# Patient Record
Sex: FEMALE | Race: WHITE
[De-identification: names, ages, dates, MRNs, and addresses within clinical notes are randomized per-mention and may not be internally consistent; named-entity substitution may affect disease eponyms.]

---

## 2017-02-13 ENCOUNTER — HOSPITAL ENCOUNTER (OUTPATIENT)
Dept: HOSPITAL 47 - LABWHC1 | Age: 77
Discharge: HOME | End: 2017-02-13
Attending: OBSTETRICS & GYNECOLOGY
Payer: MEDICARE

## 2017-02-13 DIAGNOSIS — C56.2: Primary | ICD-10-CM

## 2017-02-13 PROCEDURE — 36415 COLL VENOUS BLD VENIPUNCTURE: CPT

## 2017-02-13 PROCEDURE — 86304 IMMUNOASSAY TUMOR CA 125: CPT

## 2017-02-21 ENCOUNTER — HOSPITAL ENCOUNTER (OUTPATIENT)
Dept: HOSPITAL 47 - RADCTMAIN | Age: 77
Discharge: HOME | End: 2017-02-21
Attending: OBSTETRICS & GYNECOLOGY
Payer: MEDICARE

## 2017-02-21 DIAGNOSIS — R59.0: ICD-10-CM

## 2017-02-21 DIAGNOSIS — K63.89: ICD-10-CM

## 2017-02-21 DIAGNOSIS — C56.1: Primary | ICD-10-CM

## 2017-02-21 DIAGNOSIS — K57.30: ICD-10-CM

## 2017-02-21 LAB
BUN SERPL-SCNC: 21 MG/DL (ref 7–17)
NON-AFRICAN AMERICAN GFR(MDRD): >60

## 2017-02-21 PROCEDURE — 82565 ASSAY OF CREATININE: CPT

## 2017-02-21 PROCEDURE — 71020: CPT

## 2017-02-21 PROCEDURE — 36415 COLL VENOUS BLD VENIPUNCTURE: CPT

## 2017-02-21 PROCEDURE — 74177 CT ABD & PELVIS W/CONTRAST: CPT

## 2017-02-21 PROCEDURE — 84520 ASSAY OF UREA NITROGEN: CPT

## 2017-02-21 NOTE — CT
EXAMINATION TYPE: CT abdomen pelvis w con

 

DATE OF EXAM: 2/21/2017 12:49 PM

 

COMPARISON: 10/24/2016

 

HISTORY: 76-year-old female has no complaints at time of service.  Follow up study for history of ova
ector CA.

 

TECHNIQUE: Contiguous axial scanning of the abdomen and pelvis following administration of 100 ml Omn
ipaque 300 IV contrast.  Delayed images through the kidneys and coronal/sagittal reconstructions perf
ormed.

 

CT DLP: 1008 mGycm

Automated exposure control for dose reduction was used.

 

 

FINDINGS: 

Heart is upper limits of normal in size without pericardial effusion. Strandy atelectasis and scarrin
g at the lung bases without pleural effusion.

 

Redemonstrated moderate to large hiatal hernia.

 

No focal liver lesion or biliary ductal dilatation. Portal venous system is patent.

 

Gallbladder, adrenal glands, kidneys, and spleen with a calcified granuloma appear within normal limi
ts. Focal calcification along the pancreatic body is unchanged. Otherwise, pancreas shows no gross ab
normality.

 

A 9 mm, mildly enlarged left paraaortic lymph node axial image 27 has increased in size

 

Scattered borderline and nonenlarged mesenteric lymph nodes show no significant change.

 

However, there is new mural based thickening along the right lateral aspect of the upper ascending co
nimco with adjacent omental soft tissue nodularity measuring up to 9 mm, refer to axial images 27 and 3
0.

 

Additional nodularity along the left paracolic gutter measuring 7 mm axial image 21 is new.

 

Redemonstrated ventral abdominal wall hernia involving a short segment Valerio hernia of the mid starr
sverse colon.

 

Bladder is urine distended. There is left hemicolonic diverticulosis, greatest in the sigmoid colon w
ithout acute diverticulitis. Subsequent frontal wall thickening of the distal sigmoid probably relate
s to nondistention. No abnormal fluid collection in the pelvis. Uterus and both ovaries are surgicall
y absent.

 

Bones: Osteitis pubis, degenerative changes at both SI joints, advanced degenerative changes in the m
id to lower lumbar spine with bilateral L5 pars defects and grade 1 L5-S1 anterolisthesis along with 
grade 2 anterolisthesis at L4-L5 secondary to hypertrophic facet arthropathy. No osseous destructive 
process seen.

 

 

 

IMPRESSION: 

 

1. NEW SOFT TISSUE THICKENING ALONG THE RIGHT LATERAL ASPECT OF THE UPPER ASCENDING COLON WITH ADJACE
NT OMENTAL NODULARITY. FINDINGS ARE SUSPICIOUS FOR EARLY DISEASE RECURRENCE.

2. THERE IS ALSO AN ENLARGING 9 MM LEFT PARA-AORTIC LYMPH NODE AND 7 MM SOFT TISSUE NODULARITY IN THE
 LEFT PARACOLIC GUTTER WHICH ARE ALSO SUSPICIOUS.

3. REDEMONSTRATED SHORT SEGMENT TRANSVERSE COLON VALERIO HERNIA.

4. LEFT HEMICOLONIC DIVERTICULOSIS, GREATEST IN THE SIGMOID COLON.

5. ADVANCED DEGENERATIVE CHANGES OF THE SPINE WITH A DEGENERATIVE GRADE 2 ANTEROLISTHESIS AT L4-L5 AN
D A GRADE 1 ANTEROLISTHESIS L5-S1 SECONDARY TO L5 PARS DEFECTS.

## 2017-02-21 NOTE — XR
EXAMINATION TYPE: XR chest 2V

 

DATE OF EXAM: 2/21/2017 11:53 AM

 

COMPARISON: Prior chest x-ray 24 October 2016

 

HISTORY: Ovarian carcinoma

 

TECHNIQUE:  Frontal and lateral views of the chest are obtained.

 

FINDINGS:  There is no focal air space opacity, pleural effusion, or pneumothorax seen.  The cardiac 
silhouette size is within normal limits. Stable spinal curvature. There is a hiatal hernia present. P
rominent lung volume may be indicative of COPD.   The osseous structures are intact, lower thoracic c
ompression fracture T10 again noted.

 

IMPRESSION:  No acute cardiopulmonary process.

## 2017-03-19 ENCOUNTER — HOSPITAL ENCOUNTER (INPATIENT)
Dept: HOSPITAL 47 - EC | Age: 77
LOS: 4 days | Discharge: ANOTHER HOSPITAL | DRG: 872 | End: 2017-03-23
Payer: MEDICARE

## 2017-03-19 VITALS — BODY MASS INDEX: 28 KG/M2

## 2017-03-19 DIAGNOSIS — K57.30: ICD-10-CM

## 2017-03-19 DIAGNOSIS — Z85.038: ICD-10-CM

## 2017-03-19 DIAGNOSIS — Z90.49: ICD-10-CM

## 2017-03-19 DIAGNOSIS — I50.9: ICD-10-CM

## 2017-03-19 DIAGNOSIS — Z86.19: ICD-10-CM

## 2017-03-19 DIAGNOSIS — Z87.11: ICD-10-CM

## 2017-03-19 DIAGNOSIS — Z82.49: ICD-10-CM

## 2017-03-19 DIAGNOSIS — M81.0: ICD-10-CM

## 2017-03-19 DIAGNOSIS — Z79.899: ICD-10-CM

## 2017-03-19 DIAGNOSIS — E86.0: ICD-10-CM

## 2017-03-19 DIAGNOSIS — N17.9: ICD-10-CM

## 2017-03-19 DIAGNOSIS — R18.8: ICD-10-CM

## 2017-03-19 DIAGNOSIS — A41.9: Primary | ICD-10-CM

## 2017-03-19 DIAGNOSIS — I11.0: ICD-10-CM

## 2017-03-19 DIAGNOSIS — Z85.43: ICD-10-CM

## 2017-03-19 DIAGNOSIS — N39.0: ICD-10-CM

## 2017-03-19 DIAGNOSIS — K21.9: ICD-10-CM

## 2017-03-19 DIAGNOSIS — E44.1: ICD-10-CM

## 2017-03-19 DIAGNOSIS — Z90.710: ICD-10-CM

## 2017-03-19 LAB
ALP SERPL-CCNC: 80 U/L (ref 38–126)
ALT SERPL-CCNC: 33 U/L (ref 9–52)
AMYLASE SERPL-CCNC: 39 U/L (ref 30–110)
ANION GAP SERPL CALC-SCNC: 20 MMOL/L
AST SERPL-CCNC: 18 U/L (ref 14–36)
BASOPHILS # BLD AUTO: 0 K/UL (ref 0–0.2)
BASOPHILS NFR BLD AUTO: 0 %
BILIRUB UR QL STRIP.AUTO: (no result)
BUN SERPL-SCNC: 35 MG/DL (ref 7–17)
CALCIUM SPEC-MCNC: 8.9 MG/DL (ref 8.4–10.2)
CH: 30.7
CHCM: 33.5
CHLORIDE SERPL-SCNC: 98 MMOL/L (ref 98–107)
CO2 SERPL-SCNC: 19 MMOL/L (ref 22–30)
EOSINOPHIL # BLD AUTO: 0 K/UL (ref 0–0.7)
EOSINOPHIL NFR BLD AUTO: 0 %
ERYTHROCYTE [DISTWIDTH] IN BLOOD BY AUTOMATED COUNT: 3.97 M/UL (ref 3.8–5.4)
ERYTHROCYTE [DISTWIDTH] IN BLOOD: 11.9 % (ref 11.5–15.5)
GLUCOSE SERPL-MCNC: 143 MG/DL (ref 74–99)
HCT VFR BLD AUTO: 36.4 % (ref 34–46)
HDW: 2.54
HGB BLD-MCNC: 12 GM/DL (ref 11.4–16)
INR PPP: 1.2 (ref ?–1.1)
KETONES UR QL STRIP.AUTO: (no result)
LUC NFR BLD AUTO: 1 %
LYMPHOCYTES # SPEC AUTO: 0.7 K/UL (ref 1–4.8)
LYMPHOCYTES NFR SPEC AUTO: 4 %
MCH RBC QN AUTO: 30.2 PG (ref 25–35)
MCHC RBC AUTO-ENTMCNC: 32.9 G/DL (ref 31–37)
MCV RBC AUTO: 91.8 FL (ref 80–100)
MONOCYTES # BLD AUTO: 1.8 K/UL (ref 0–1)
MONOCYTES NFR BLD AUTO: 9 %
NEUTROPHILS # BLD AUTO: 16.5 K/UL (ref 1.3–7.7)
NEUTROPHILS NFR BLD AUTO: 85 %
NON-AFRICAN AMERICAN GFR(MDRD): 31
PARTICLE COUNT: (no result)
PH UR: 5 [PH] (ref 5–8)
POTASSIUM SERPL-SCNC: 4.3 MMOL/L (ref 3.5–5.1)
PROT SERPL-MCNC: 5.9 G/DL (ref 6.3–8.2)
PROT UR QL: (no result)
PT BLD: 11.5 SEC (ref 9–12)
RBC UR QL: 10 /HPF (ref 0–5)
SODIUM SERPL-SCNC: 137 MMOL/L (ref 137–145)
SP GR UR: 1.02 (ref 1–1.03)
SQUAMOUS UR QL AUTO: 5 /HPF (ref 0–4)
UA BILLING (MACRO VS. MICRO): (no result)
UROBILINOGEN UR QL STRIP: 2 MG/DL (ref ?–2)
WBC # BLD AUTO: 0.27 10*3/UL
WBC # BLD AUTO: 19.4 K/UL (ref 3.8–10.6)
WBC #/AREA URNS HPF: >182 /HPF (ref 0–5)
WBC (PEROX): 19.74

## 2017-03-19 PROCEDURE — 93005 ELECTROCARDIOGRAM TRACING: CPT

## 2017-03-19 PROCEDURE — 87324 CLOSTRIDIUM AG IA: CPT

## 2017-03-19 PROCEDURE — 87086 URINE CULTURE/COLONY COUNT: CPT

## 2017-03-19 PROCEDURE — 81001 URINALYSIS AUTO W/SCOPE: CPT

## 2017-03-19 PROCEDURE — 96375 TX/PRO/DX INJ NEW DRUG ADDON: CPT

## 2017-03-19 PROCEDURE — 71010: CPT

## 2017-03-19 PROCEDURE — 82150 ASSAY OF AMYLASE: CPT

## 2017-03-19 PROCEDURE — 96365 THER/PROPH/DIAG IV INF INIT: CPT

## 2017-03-19 PROCEDURE — 85025 COMPLETE CBC W/AUTO DIFF WBC: CPT

## 2017-03-19 PROCEDURE — 85610 PROTHROMBIN TIME: CPT

## 2017-03-19 PROCEDURE — 87040 BLOOD CULTURE FOR BACTERIA: CPT

## 2017-03-19 PROCEDURE — 36415 COLL VENOUS BLD VENIPUNCTURE: CPT

## 2017-03-19 PROCEDURE — 83605 ASSAY OF LACTIC ACID: CPT

## 2017-03-19 PROCEDURE — 87046 STOOL CULTR AEROBIC BACT EA: CPT

## 2017-03-19 PROCEDURE — 74177 CT ABD & PELVIS W/CONTRAST: CPT

## 2017-03-19 PROCEDURE — 74020: CPT

## 2017-03-19 PROCEDURE — 96372 THER/PROPH/DIAG INJ SC/IM: CPT

## 2017-03-19 PROCEDURE — 96361 HYDRATE IV INFUSION ADD-ON: CPT

## 2017-03-19 PROCEDURE — 87045 FECES CULTURE AEROBIC BACT: CPT

## 2017-03-19 PROCEDURE — 99285 EMERGENCY DEPT VISIT HI MDM: CPT

## 2017-03-19 PROCEDURE — 80053 COMPREHEN METABOLIC PANEL: CPT

## 2017-03-19 PROCEDURE — 74176 CT ABD & PELVIS W/O CONTRAST: CPT

## 2017-03-19 PROCEDURE — 83690 ASSAY OF LIPASE: CPT

## 2017-03-19 RX ADMIN — ESCITALOPRAM OXALATE SCH MG: 10 TABLET, FILM COATED ORAL at 21:12

## 2017-03-19 RX ADMIN — CEFAZOLIN SCH: 330 INJECTION, POWDER, FOR SOLUTION INTRAMUSCULAR; INTRAVENOUS at 18:08

## 2017-03-19 RX ADMIN — PANTOPRAZOLE SODIUM SCH MG: 40 INJECTION, POWDER, FOR SOLUTION INTRAVENOUS at 21:16

## 2017-03-19 NOTE — ED
Abdominal Pain HPI





- General


Source: patient, RN notes reviewed


Mode of arrival: ambulatory


Limitations: no limitations





<Rocio Zamora - Last Filed: 17 15:30>





<Savage Dumont - Last Filed: 17 15:32>





- General


Chief Complaint: Abdominal Pain


Stated Complaint: NAUSEA AND VOMITING X 3 DAYS


Time Seen by Provider: 17 11:44





- History of Present Illness


Initial Comments: 





76-year-old female presents to the emergency department with chief complaint of 

epigastric abdominal pain.  Patient states that she has had this pain since 

about Friday.  Patient states that she has had nausea vomiting and diarrhea 

with this.  Patient states that she just has a little bit of epigastric 

discomfort.  Patient recently did have a bowel resection 2 weeks ago.  Patient 

states she hasn't really noticed a high fever but she has just felt achy and 

off.  Patient states she was concerned due to her continued symptoms that she 

thought that she should be evaluated. Patient denies any recent fever, chills, 

shortness of breath, chest pain, back pain,  numbness or tingling, dysuria or 

hematuria, constipation,  headaches or visual changes, or any other current 

symptoms. (Rocio Zamora)





- Related Data


 Home Medications











 Medication  Instructions  Recorded  Confirmed


 


Escitalopram [Lexapro] 10 mg PO HS 05/05/15 03/19/17


 


Cholecalciferol [Vitamin D3] 1,000 unit PO DAILY 16


 


ALPRAZolam [Xanax] 0.25 mg PO TID PRN 17


 


Enoxaparin [Lovenox] 40 mg SQ DAILY 17


 


HYDROcodone/APAP 5-325MG [Norco 1 tab PO Q4HR PRN 17





5-325]   


 


Metoprolol Succinate [Toprol XL] 25 mg PO DAILY 17


 


Multivits-Min/Iron/FA/Lutein 1 tab PO DAILY 17





[Centrum Silver Women Tablet]   











 Allergies











Allergy/AdvReac Type Severity Reaction Status Date / Time


 


No Known Allergies Allergy   Verified 17 12:24














Review of Systems


ROS Other: All systems not noted in ROS Statement are negative.





<Rocio Zamora - Last Filed: 17 15:30>


ROS Other: All systems not noted in ROS Statement are negative.





<Savage Dumont - Last Filed: 17 15:32>


ROS Statement: 


Those systems with pertinent positive or pertinent negative responses have been 

documented in the HPI.








Past Medical History


Past Medical History: Hypertension


Additional Past Medical History / Comment(s): Other HX:  gastric ulcer years ago

, osteoporosis, heart murmur, athritis bilateral hands  /     2015  [facility] 

CANCER WITH CHEMO AND SURGERY/shingles


History of Any Multi-Drug Resistant Organisms: None Reported


Past Surgical History: Hernia Repair, Hysterectomy


Additional Past Surgical History / Comment(s): Abdominal hernia repair 40 yrs 

ago.    HX HYSTERECTOMY AND OOPHERECTOMY


Past Anesthesia/Blood Transfusion Reactions: No Reported Reaction


Additional Past Anesthesia/Blood Transfusion Reaction / Comment(s): Pt has 

never recieved blood.


Past Psychological History: Anxiety


Additional Psychological History / Comment(s): Pt lives alone.  She has a very 

supportive family and good friends and neighbors.  She is normally active and 

still works at [employer].


Smoking Status: Never smoker


Past Alcohol Use History: None Reported


Past Drug Use History: None Reported





- Past Family History


  ** Father


Family Medical History: Cancer, Congestive Heart Failure (CHF)


Additional Family Medical History / Comment(s): Father had rheumatic fever as a 

child.  He  in his 60s.





  ** Mother


Family Medical History: Congestive Heart Failure (CHF)





<Rocio Zamora - Last Filed: 17 15:30>





General Exam


Limitations: no limitations





<Rocio Zamora - Last Filed: 17 15:30>


General appearance: alert, in no apparent distress


Head exam: Present: atraumatic, normocephalic, normal inspection


Eye exam: Present: normal appearance, PERRL, EOMI.  Absent: scleral icterus, 

conjunctival injection, periorbital swelling


ENT exam: Present: normal exam, mucous membranes moist


Neck exam: Present: normal inspection.  Absent: tenderness, meningismus, 

lymphadenopathy


Respiratory exam: Present: normal lung sounds bilaterally.  Absent: respiratory 

distress, wheezes, rales, rhonchi, stridor


Cardiovascular Exam: Present: regular rate, normal rhythm, normal heart sounds.

  Absent: systolic murmur, diastolic murmur, rubs, gallop, clicks


GI/Abdominal exam: Present: soft, normal bowel sounds.  Absent: distended, 

tenderness, guarding, rebound, rigid


Extremities exam: Present: normal inspection, full ROM, normal capillary 

refill.  Absent: tenderness, pedal edema, joint swelling, calf tenderness


Back exam: Present: normal inspection


Neurological exam: Present: alert, oriented X3, CN II-XII intact


Psychiatric exam: Present: normal affect, normal mood


Skin exam: Present: warm, dry, intact, normal color.  Absent: rash





<aSvage Dumont - Last Filed: 17 15:32>





- General Exam Comments


Initial Comments: 





General:  The patient is awake and alert, in no distress, and does not appear 

acutely ill. 


Eye:  Pupils are equal, round and reactive to light, extra-ocular movements are 

intact; there is normal conjunctiva bilaterally.  No signs of icterus.  


Ears, nose, mouth and throat:  There are moist mucous membranes.


Neck:  The neck is supple, there is no tenderness.


Cardiovascular:  There is a regular rate and rhythm. No murmur, rub or gallop 

is appreciated.


Respiratory:  Lungs are clear to auscultation, respirations are non-labored, 

breath sounds are equal.  No wheezes, stridor, rales, or rhonchi.


Gastrointestinal: Healing surgical incision with staples still intact, Soft, non

-distended, non-tender abdomen without masses or organomegaly noted. There is 

no rebound or guarding present.  No CVA tenderness. Bowel sounds are 

unremarkable.


Back:  There is no tenderness to palpation in the midline. There is no obvious 

deformity. No rashes noted. 


Musculoskeletal:  Normal ROM, no tenderness, There is no pedal edema. There is 

no calf tenderness or swelling. Sensation intact. Pulses equal bilaterally 2+.  


Neurological:  CN II-XII intact, There are no obvious motor or sensory 

deficits. Coordination appears grossly intact. Speech is normal.


Skin:  Skin is warm and dry and no rashes or lesions are noted. 


Psychiatric:  Cooperative, appropriate mood & affect, normal judgment.  


 (Rocio Zamora)





Incision is clean dry intact (Svaage Dumont)





Course





<Rocio Zamora - Last Filed: 17 15:30>





<Savage Dumont - Last Filed: 17 15:32>





 Vital Signs











  17





  11:38 14:12


 


Temperature 99.3 F 99.3 F


 


Pulse Rate 120 H 95


 


Respiratory 20 18





Rate  


 


Blood Pressure 122/65 118/56


 


O2 Sat by Pulse 97 99





Oximetry  














- Reevaluation(s)


Reevaluation #1: 





17 15:31


Patient's symptoms controlled with antiemetics IV hydration (Savage Dumont)





Medical Decision Making





- Lab Data


Result diagrams: 


 17 11:59





 17 11:59





- Radiology Data


Radiology results: report reviewed, image reviewed





<Rocio Zamora - Last Filed: 17 15:30>





- Lab Data


Result diagrams: 


 17 11:59





 17 11:59





- Radiology Data


Radiology results: report reviewed (CT pelvis negative for. negative for postOp 

complication)





<Savage Dumont - Last Filed: 17 15:32>





- Medical Decision Making





76-year-old female presents for nausea vomiting and diarrhea.  This time patient

's lab work is reviewed that show an elevated with blood cell count with what 

appears to be a positive urine for UTI.  We will start Rocephin for the 

patient.  Patient also is found to have some inflammation around her surgical 

sutures external exam does appear to be normal.  This time most likely normal 

healing.  This time we will admit the patient for dehydration, UTI, nausea 

vomiting.  Patient will go to on call doctor tomorrow.  Discussed with family 

in the end agreement with the plan.  Patient's oncologist Dr. SAXENA will also be 

consulted. (Rocio Zamora)





36 female the ER with nausea vomiting mild without any cramping, positive for 

urinary tract infection with diarrhea, though she was IV antibiotics and 

rehydration control vomiting.  No postop palpitation at this time is noted (

Savage Dumont)





- Lab Data





 Lab Results











  17 Range/Units





  11:59 11:59 11:59 


 


WBC   19.4 H   (3.8-10.6)  k/uL


 


RBC   3.97   (3.80-5.40)  m/uL


 


Hgb   12.0   (11.4-16.0)  gm/dL


 


Hct   36.4   (34.0-46.0)  %


 


MCV   91.8   (80.0-100.0)  fL


 


MCH   30.2   (25.0-35.0)  pg


 


MCHC   32.9   (31.0-37.0)  g/dL


 


RDW   11.9   (11.5-15.5)  %


 


Plt Count   394   (150-450)  k/uL


 


Neutrophils %   85   %


 


Lymphocytes %   4   %


 


Monocytes %   9   %


 


Eosinophils %   0   %


 


Basophils %   0   %


 


Neutrophils #   16.5 H   (1.3-7.7)  k/uL


 


Lymphocytes #   0.7 L   (1.0-4.8)  k/uL


 


Monocytes #   1.8 H   (0-1.0)  k/uL


 


Eosinophils #   0.0   (0-0.7)  k/uL


 


Basophils #   0.0   (0-0.2)  k/uL


 


Sodium  137    (137-145)  mmol/L


 


Potassium  4.3    (3.5-5.1)  mmol/L


 


Chloride  98    ()  mmol/L


 


Carbon Dioxide  19 L    (22-30)  mmol/L


 


Anion Gap  20    mmol/L


 


BUN  35 H    (7-17)  mg/dL


 


Creatinine  1.62 H    (0.52-1.04)  mg/dL


 


Est GFR (MDRD) Af Amer  38    (>60 ml/min/1.73 sqM)  


 


Est GFR (MDRD) Non-Af  31    (>60 ml/min/1.73 sqM)  


 


Glucose  143 H    (74-99)  mg/dL


 


Plasma Lactic Acid Juan Carlos    1.6  (0.7-2.0)  mmol/L


 


Calcium  8.9    (8.4-10.2)  mg/dL


 


Total Bilirubin  1.0    (0.2-1.3)  mg/dL


 


AST  18    (14-36)  U/L


 


ALT  33    (9-52)  U/L


 


Alkaline Phosphatase  80    ()  U/L


 


Total Protein  5.9 L    (6.3-8.2)  g/dL


 


Albumin  3.1 L    (3.5-5.0)  g/dL


 


Amylase  39    ()  U/L


 


Lipase  55    ()  U/L


 


Urine Color     


 


Urine Appearance     (Clear)  


 


Urine pH     (5.0-8.0)  


 


Ur Specific Gravity     (1.001-1.035)  


 


Urine Protein     (Negative)  


 


Urine Glucose (UA)     (Negative)  


 


Urine Ketones     (Negative)  


 


Urine Blood     (Negative)  


 


Urine Nitrite     (Negative)  


 


Urine Bilirubin     (Negative)  


 


Urine Urobilinogen     (<2.0)  mg/dL


 


Ur Leukocyte Esterase     (Negative)  


 


Urine RBC     (0-5)  /hpf


 


Urine WBC     (0-5)  /hpf


 


Urine WBC Clumps     (None)  /hpf


 


Ur Squamous Epith Cells     (0-4)  /hpf


 


Amorphous Sediment     (None)  /hpf


 


Urine Bacteria     (None)  /hpf


 


Hyaline Casts     (0-2)  /lpf


 


Urine Mucus     (None)  /hpf














  17 Range/Units





  14:52 


 


WBC   (3.8-10.6)  k/uL


 


RBC   (3.80-5.40)  m/uL


 


Hgb   (11.4-16.0)  gm/dL


 


Hct   (34.0-46.0)  %


 


MCV   (80.0-100.0)  fL


 


MCH   (25.0-35.0)  pg


 


MCHC   (31.0-37.0)  g/dL


 


RDW   (11.5-15.5)  %


 


Plt Count   (150-450)  k/uL


 


Neutrophils %   %


 


Lymphocytes %   %


 


Monocytes %   %


 


Eosinophils %   %


 


Basophils %   %


 


Neutrophils #   (1.3-7.7)  k/uL


 


Lymphocytes #   (1.0-4.8)  k/uL


 


Monocytes #   (0-1.0)  k/uL


 


Eosinophils #   (0-0.7)  k/uL


 


Basophils #   (0-0.2)  k/uL


 


Sodium   (137-145)  mmol/L


 


Potassium   (3.5-5.1)  mmol/L


 


Chloride   ()  mmol/L


 


Carbon Dioxide   (22-30)  mmol/L


 


Anion Gap   mmol/L


 


BUN   (7-17)  mg/dL


 


Creatinine   (0.52-1.04)  mg/dL


 


Est GFR (MDRD) Af Amer   (>60 ml/min/1.73 sqM)  


 


Est GFR (MDRD) Non-Af   (>60 ml/min/1.73 sqM)  


 


Glucose   (74-99)  mg/dL


 


Plasma Lactic Acid Juan Carlos   (0.7-2.0)  mmol/L


 


Calcium   (8.4-10.2)  mg/dL


 


Total Bilirubin   (0.2-1.3)  mg/dL


 


AST   (14-36)  U/L


 


ALT   (9-52)  U/L


 


Alkaline Phosphatase   ()  U/L


 


Total Protein   (6.3-8.2)  g/dL


 


Albumin   (3.5-5.0)  g/dL


 


Amylase   ()  U/L


 


Lipase   ()  U/L


 


Urine Color  Yellow  


 


Urine Appearance  Cloudy H  (Clear)  


 


Urine pH  5.0  (5.0-8.0)  


 


Ur Specific Gravity  1.018  (1.001-1.035)  


 


Urine Protein  1+ H  (Negative)  


 


Urine Glucose (UA)  Trace H  (Negative)  


 


Urine Ketones  1+ H  (Negative)  


 


Urine Blood  Small H  (Negative)  


 


Urine Nitrite  Negative  (Negative)  


 


Urine Bilirubin  1+ H  (Negative)  


 


Urine Urobilinogen  2.0  (<2.0)  mg/dL


 


Ur Leukocyte Esterase  Moderate H  (Negative)  


 


Urine RBC  10 H  (0-5)  /hpf


 


Urine WBC  >182 H  (0-5)  /hpf


 


Urine WBC Clumps  Moderate H  (None)  /hpf


 


Ur Squamous Epith Cells  5 H  (0-4)  /hpf


 


Amorphous Sediment  Occasional H  (None)  /hpf


 


Urine Bacteria  Occasional H  (None)  /hpf


 


Hyaline Casts  63 H  (0-2)  /lpf


 


Urine Mucus  Rare H  (None)  /hpf














Disposition


Time of Disposition: 15:30


Decision Date: 17


Decision Time: 15:30





<Rocio Zamora - Last Filed: 17 15:30>





<Savage Dumont - Last Filed: 17 15:32>


Clinical Impression: 


 Nausea & vomiting, UTI (urinary tract infection), Ovarian cancer, Abdominal 

pain, Ascites





Disposition: ADMITTED AS IP TO THIS [facility]


Condition: Stable


Referrals: 


PINA Lay III, MD [Primary Care Provider] - 1-2 days

## 2017-03-19 NOTE — P.GSCN
History of Present Illness


Consult date: 17


Reason for Consult: 





Abdominal pain, distention and vomiting


History of present illness: 





The patient is a pleasant 76-year-old female who began having abdominal 

complaints Thursday night.  She had epigastric discomfort and nausea. she began 

having some intermittent vomiting Friday then Saturday.  It got worse today so 

she came into the emergency department.  She admits to some coffee ground 

emesis.  She's had some bowel movements without dark tarry stools or obvious 

blood.  She's about 2 weeks post colon resection for colon cancer.  She has a 

history of a ELVIA/BSO one year ago for ovarian cancer.  She believes there was 

no sign of ovarian cancer at her recent operation.  No fevers or chills.  No 

one else is been ill.  She has been taking Norco for pain along with ibuprofen 

she has a history of ulcer disease in the past.  She's been taking either 

subcutaneous heparin or Lovenox at home for DVT prophylaxis.





Review of Systems


All systems: negative





Past Medical History


Past Medical History: Cancer, Heart Failure, GERD/Reflux, Hypertension


Additional Past Medical History / Comment(s): Other HX:  gastric ulcer years ago

, osteoporosis, heart murmur, athritis bilateral hands  /     2015  [facility] 

CANCER WITH CHEMO AND ELVIA/BSO/shingles


History of Any Multi-Drug Resistant Organisms: None Reported


Past Surgical History: Hernia Repair, Hysterectomy


Additional Past Surgical History / Comment(s): Abdominal hernia repair 40 yrs 

ago.    ELVIA/BSO, colon resection 2 weeks ago


Past Anesthesia/Blood Transfusion Reactions: No Reported Reaction


Additional Past Anesthesia/Blood Transfusion Reaction / Comm: Pt has never 

recieved blood.


Past Psychological History: Anxiety


Additional Psychological History / Comment(s): Pt lives alone.  She has a very 

supportive family and good friends and neighbors.  She is normally active and 

still works at [employer].


Smoking Status: Never smoker


Past Alcohol Use History: None Reported


Past Drug Use History: None Reported





- Past Family History


  ** Father


Family Medical History: Cancer, Congestive Heart Failure (CHF)


Additional Family Medical History / Comment(s): Father had rheumatic fever as a 

child.  He  in his 60s.





  ** Mother


Family Medical History: Congestive Heart Failure (CHF)





Medications and Allergies


 Home Medications











 Medication  Instructions  Recorded  Confirmed  Type


 


Escitalopram [Lexapro] 10 mg PO HS 05/05/15 03/19/17 History


 


Cholecalciferol [Vitamin D3] 1,000 unit PO DAILY 16 History


 


ALPRAZolam [Xanax] 0.25 mg PO TID PRN 17 History


 


Enoxaparin [Lovenox] 40 mg SQ DAILY 17 History


 


HYDROcodone/APAP 5-325MG [Norco 1 tab PO Q4HR PRN 17 History





5-325]    


 


Metoprolol Succinate [Toprol XL] 25 mg PO DAILY 17 History


 


Multivits-Min/Iron/FA/Lutein 1 tab PO DAILY 17 History





[Centrum Silver Women Tablet]    











 Allergies











Allergy/AdvReac Type Severity Reaction Status Date / Time


 


No Known Allergies Allergy   Verified 17 12:24














Surgical - Exam


Osteopathic Statement: *.  No significant issues noted on an osteopathic 

structural exam other than those noted in the History and Physical/Consult.


 Vital Signs











Temp Pulse Resp BP Pulse Ox


 


 99.3 F   120 H  20   122/65   97 


 


 17 11:38  17 11:38  17 11:38  17 11:38  17 11:38














- General


well developed, well nourished, no distress





- Eyes


normal ocular movement





- Neck


trachea midline, no lymphadectomy





- Respiratory


normal respiratory effort, clear to auscultation





- Cardiovascular


Rhythm: regular


Abnormal Heart Sounds: systolic murmur





- Abdomen


Abdomen: soft, tender (In the epigastrium), bowel sounds, surgical scars (Long 

midline scar with staples which are intact.  No evidence of cellulitis or seroma

)





- Psychiatric


oriented to time, oriented to person, oriented to place, speech is normal, 

memory intact





Results





- Labs





 17 11:59





 17 11:59





- Imaging


CT scan - abdomen: report reviewed, image reviewed (Likely normal postsurgical 

changes)





Assessment and Plan


(1) History of peptic ulcer disease


Status: Acute   





(2) Coffee ground emesis


Status: Acute   





(3) Abdominal pain


Status: Acute   





(4) Nausea & vomiting


Status: Acute   





(5) UTI (urinary tract infection)


Status: Acute   


Plan: 





I agree with medical management of hydration, antibiotics, antibiotics, bowel 

rest.  Ulcer prophylaxis.  Will add Carafate.  Possible EGD tomorrow depending 

on patient's condition.  Further recommendations to follow.

## 2017-03-19 NOTE — CT
EXAMINATION TYPE: CT abdomen pelvis wo con

 

DATE OF EXAM: 3/19/2017 1:45 PM

 

HISTORY: Recent bowel resection due to colon CA. Vomiting and nausea x3 days.

 

CT DLP: 303.6 mGycm.  Automated Exposure Control for Dose Reduction was Utilized.

 

TECHNIQUE:  CT scan of the abdomen and pelvis is performed without oral or IV contrast.

 

COMPARISON: CT abdomen pelvis February 21, 2017

 

FINDINGS:  Within the limitations of a non-contrast study, the following observations are made.

 

LUNG BASES: There are tiny bilateral pleural effusions now present.

 

LIVER/GB: Liver is somewhat small in size and current study with new surrounding perihepatic ascites.
 Gallbladder has distended margins without CT dense intraluminal gallstones or suspicious wall thicke
troy on current study.

 

PANCREAS: No significant abnormality is seen.

 

SPLEEN: Occasional calcifications scattered throughout the spleen are present seen.

 

ADRENALS: No significant abnormality is seen.

 

KIDNEYS: No significant abnormality is seen.

 

BOWEL: There is partial visualization of moderate to large size hiatal hernia redemonstrated. Evaluat
ion bowel is suboptimal due to lack of enteric contrast. There is no suspicious small or large bowel 
dilatation seen. Diffuse colonic diverticulosis is present. Surgical sutures from right-sided hemicol
ectomy are noted. There is mild to moderate wall thickening of the remnant transverse and proximal le
ft colon.

 

GENITAL ORGANS: Uterus is surgically absent.

 

LYMPH NODES: No greater than 1cm abdominal or pelvic lymph nodes are appreciated. There are scattered
 prominent but subcentimeter lymph nodes throughout the mesentery.

 

OSSEOUS STRUCTURES: There is multilevel moderate to advanced disc space narrowing with spurring and v
acuum disc phenomenon. There is grade 1 anterolisthesis of L4 on L5. Sclerosis at pubic symphysis is 
redemonstrated.

 

OTHER: There is new small to moderate amount of abdominal and pelvic ascites which does not completel
y layer dependently making slightly more suspicious.

 

There is interval surgery of eventrated hemidiaphragm with overlying vertical skin staples present. V
ascular calculation of aorta and branch vessels is present.

 

IMPRESSION:

1. New small to moderate amount of nonsimple abdominal and pelvic ascites. Liver is somewhat small in
 size and lobulated in contour, clinical correlation for cirrhosis advised.

2. No bowel obstruction. Abnormal wall thickening of proximal colon near sutures could reflect inflam
matory or infectious process. Neoplasm is in differential but felt less likely.

## 2017-03-19 NOTE — XR
EXAMINATION TYPE: XR chest 1V portable

 

DATE OF EXAM: 3/19/2017 6:47 PM

 

CLINICAL HISTORY: CHF per order. Vomiting and nausea for 3 days. History of ovarian cancer.

 

TECHNIQUE: Single AP portable frontal upright view of the chest is obtained.

 

COMPARISON: Chest x-ray February 21, 2017. CT cap January 4, 2016.

 

FINDINGS: Diminished inspiration is seen on current study There is no focal air space opacity, pleura
l effusion, or pneumothorax seen.  The cardiac silhouette size is enlarged with ectatic ascending tho
racic aorta redemonstrated.   The osseous structures are demineralized.

 

IMPRESSION: Cardiomegaly without acute pulmonary process.

## 2017-03-20 LAB
ALP SERPL-CCNC: 53 U/L (ref 38–126)
ALT SERPL-CCNC: 28 U/L (ref 9–52)
ANION GAP SERPL CALC-SCNC: 10 MMOL/L
AST SERPL-CCNC: 21 U/L (ref 14–36)
BASOPHILS # BLD AUTO: 0 K/UL (ref 0–0.2)
BASOPHILS NFR BLD AUTO: 0 %
BUN SERPL-SCNC: 27 MG/DL (ref 7–17)
CALCIUM SPEC-MCNC: 7.8 MG/DL (ref 8.4–10.2)
CH: 30
CHCM: 31.2
CHLORIDE SERPL-SCNC: 107 MMOL/L (ref 98–107)
CO2 SERPL-SCNC: 21 MMOL/L (ref 22–30)
EOSINOPHIL # BLD AUTO: 0 K/UL (ref 0–0.7)
EOSINOPHIL NFR BLD AUTO: 0 %
ERYTHROCYTE [DISTWIDTH] IN BLOOD BY AUTOMATED COUNT: 3.18 M/UL (ref 3.8–5.4)
ERYTHROCYTE [DISTWIDTH] IN BLOOD: 11.9 % (ref 11.5–15.5)
GLUCOSE SERPL-MCNC: 86 MG/DL (ref 74–99)
HCT VFR BLD AUTO: 30.6 % (ref 34–46)
HDW: 2.37
HGB BLD-MCNC: 10 GM/DL (ref 11.4–16)
LUC NFR BLD AUTO: 2 %
LYMPHOCYTES # SPEC AUTO: 1 K/UL (ref 1–4.8)
LYMPHOCYTES NFR SPEC AUTO: 8 %
MCH RBC QN AUTO: 31.4 PG (ref 25–35)
MCHC RBC AUTO-ENTMCNC: 32.6 G/DL (ref 31–37)
MCV RBC AUTO: 96.3 FL (ref 80–100)
MONOCYTES # BLD AUTO: 0.9 K/UL (ref 0–1)
MONOCYTES NFR BLD AUTO: 8 %
NEUTROPHILS # BLD AUTO: 9.7 K/UL (ref 1.3–7.7)
NEUTROPHILS NFR BLD AUTO: 82 %
NON-AFRICAN AMERICAN GFR(MDRD): >60
POTASSIUM SERPL-SCNC: 4.4 MMOL/L (ref 3.5–5.1)
PROT SERPL-MCNC: 4.8 G/DL (ref 6.3–8.2)
SODIUM SERPL-SCNC: 138 MMOL/L (ref 137–145)
WBC # BLD AUTO: 0.26 10*3/UL
WBC # BLD AUTO: 11.9 K/UL (ref 3.8–10.6)
WBC (PEROX): 12.46

## 2017-03-20 RX ADMIN — DEXTROSE MONOHYDRATE SCH MLS/HR: 5 INJECTION, SOLUTION INTRAVENOUS at 16:50

## 2017-03-20 RX ADMIN — Medication SCH UNIT: at 08:00

## 2017-03-20 RX ADMIN — ACETAMINOPHEN PRN MG: 325 TABLET, FILM COATED ORAL at 21:21

## 2017-03-20 RX ADMIN — METOPROLOL SUCCINATE SCH MG: 25 TABLET, EXTENDED RELEASE ORAL at 08:00

## 2017-03-20 RX ADMIN — CEFAZOLIN SCH MLS/HR: 330 INJECTION, POWDER, FOR SOLUTION INTRAMUSCULAR; INTRAVENOUS at 13:58

## 2017-03-20 RX ADMIN — THERA TABS SCH EACH: TAB at 13:56

## 2017-03-20 RX ADMIN — HYDROMORPHONE HYDROCHLORIDE PRN MG: 1 INJECTION, SOLUTION INTRAMUSCULAR; INTRAVENOUS; SUBCUTANEOUS at 03:30

## 2017-03-20 RX ADMIN — ESCITALOPRAM OXALATE SCH MG: 10 TABLET, FILM COATED ORAL at 20:04

## 2017-03-20 RX ADMIN — DEXTROSE MONOHYDRATE SCH MLS/HR: 5 INJECTION, SOLUTION INTRAVENOUS at 08:05

## 2017-03-20 RX ADMIN — ACETAMINOPHEN PRN MG: 325 TABLET, FILM COATED ORAL at 08:01

## 2017-03-20 RX ADMIN — ENOXAPARIN SODIUM SCH MG: 40 INJECTION SUBCUTANEOUS at 08:00

## 2017-03-20 RX ADMIN — HYDROMORPHONE HYDROCHLORIDE PRN MG: 1 INJECTION, SOLUTION INTRAMUSCULAR; INTRAVENOUS; SUBCUTANEOUS at 19:57

## 2017-03-20 RX ADMIN — ACETAMINOPHEN PRN MG: 325 TABLET, FILM COATED ORAL at 03:14

## 2017-03-20 RX ADMIN — PANTOPRAZOLE SODIUM SCH MG: 40 INJECTION, POWDER, FOR SOLUTION INTRAVENOUS at 08:00

## 2017-03-20 RX ADMIN — HYDROMORPHONE HYDROCHLORIDE PRN MG: 1 INJECTION, SOLUTION INTRAMUSCULAR; INTRAVENOUS; SUBCUTANEOUS at 13:55

## 2017-03-20 RX ADMIN — PANTOPRAZOLE SODIUM SCH MG: 40 INJECTION, POWDER, FOR SOLUTION INTRAVENOUS at 20:04

## 2017-03-20 NOTE — P.CONS
History of Present Illness





- Reason for Consult


Consult date: 17


ovarian cancer


Requesting physician: Rocio Zamora





- Chief Complaint


vomiting, progressive 





- History of Present Illness





Ms. Avelar is a pleasant  female initially seen in consult at [facility] 5/

8/15, admitted with a 2-3 day history of abdominal discomfort, generalized, 

associate with bloating, emesis x 1, severe heartburn x 1 week and decreased 

appetite and slight weight loss over the prior month. A CT AP without contrast-

due to renal failure-revealed ascites. She had a paracentesis, 4.7 L fluid 

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 

primary peritoneal origin per IHC. TVUS showed an endocervical mass like area, 

1.2 cm, though that is not definite as the origin, Ca 125 was 2760.  Staging 

PET scan on 5/15/15 showed no liver involvement or disease outside the abd/

pelvis.  She was referred to Dr. Eden, initial treatment with Carbo and dose 

dense Taxol recommended, she had 3 cycles, and then proceeded to surgery on 9/21

/15, and resumed chemo on 10/24/15, for a total of 6 cycles. CT after cycle 6 

showed no evidence of recurrence and pt was placed on surveillance.  Pt follows 

closely with Dr. Eden and Dr. Vale.  F/U CT 10/16 did not reveal any definite 

evidence of disease recurrence,  level did show an elevation after 

completion of chemo.  Pt had surgery with Dr. Eden two weeks ago, no note has 

been received by Dr. Vale pertaining to the same.  Pt states malignancy and 

there are plans for chemo monthly-suspect doxil therapy.  Pt states that Friday 

she stated having nausea and vomiting,  some diarrhea as well, this has been 

progressive and she has not been able to maintain oral intake.  She denies 

fevers, her incision is well healed, no gross bloating or abd pain, no leg 

swelling, she has been belching, denies passing much flatus, unsure of last BM, 

she thinks it was small.   





Review of Systems


All systems: negative


Constitutional: Reports as per HPI





Past Medical History


Past Medical History: Cancer, Heart Failure, GERD/Reflux, Hypertension


Additional Past Medical History / Comment(s): Other HX:  gastric ulcer years ago

, osteoporosis, heart murmur, athritis bilateral hands  /       OVARIAN 

CANCER WITH CHEMO AND ELVIA/BSO/shingles


History of Any Multi-Drug Resistant Organisms: None Reported


Past Surgical History: Hernia Repair, Hysterectomy


Additional Past Surgical History / Comment(s): Abdominal hernia repair 40 yrs 

ago.    ELVIA/BSO, colon resection 2 weeks ago


Past Anesthesia/Blood Transfusion Reactions: No Reported Reaction


Additional Past Anesthesia/Blood Transfusion Reaction / Comm: Pt has never 

recieved blood.


Past Psychological History: Anxiety


Additional Psychological History / Comment(s): Pt lives alone.  She has a very 

supportive family and good friends and neighbors.  She is normally active and 

still works at [employer].


Smoking Status: Never smoker


Past Alcohol Use History: None Reported


Past Drug Use History: None Reported





- Past Family History


  ** Father


Family Medical History: Cancer, Congestive Heart Failure (CHF)


Additional Family Medical History / Comment(s): Father had rheumatic fever as a 

child.  He  in his 60s.





  ** Mother


Family Medical History: Congestive Heart Failure (CHF)





Medications and Allergies


 Home Medications











 Medication  Instructions  Recorded  Confirmed  Type


 


Escitalopram [Lexapro] 10 mg PO HS 05/05/15 03/19/17 History


 


Cholecalciferol [Vitamin D3] 1,000 unit PO DAILY 16 History


 


ALPRAZolam [Xanax] 0.25 mg PO TID PRN 17 History


 


Enoxaparin [Lovenox] 40 mg SQ DAILY 17 History


 


HYDROcodone/APAP 5-325MG [Norco 1 tab PO Q4HR PRN 17 History





5-325]    


 


Metoprolol Succinate [Toprol XL] 25 mg PO DAILY 17 History


 


Multivits-Min/Iron/FA/Lutein 1 tab PO DAILY 17 History





[Centrum Silver Women Tablet]    











 Allergies











Allergy/AdvReac Type Severity Reaction Status Date / Time


 


No Known Allergies Allergy   Verified 17 12:24














Physical Exam


Vitals: 


 Vital Signs











  Temp Pulse Resp BP Pulse Ox


 


 17 15:00  98.9 F  82  18  107/55  93 L


 


 17 07:00  98.0 F  81  18  124/62  92 L


 


 17 00:00   96  18  


 


 17 22:34  97.8 F  96  18  116/60  95








 Intake and Output











 17





 06:59 14:59 22:59


 


Intake Total 240 450 


 


Balance 240 450 


 


Intake:   


 


  IV  400 


 


    Sodium Chloride 0.9% 1,  400 





    000 ml @ 50 mls/hr IV .   





    Q20H ANUPAM Rx#:[id number]   


 


  Intake, IV Titration  50 





  Amount   


 


    Piperacillin-Tazobactam 3  50 





    .375 gm In Dextrose/Water   





    1 50ml.bag @ 12.5 mls/hr   





    IVPB Q8HR ANUPAM Rx#:   





    [id number]   


 


  Oral 240  


 


Other:   


 


  Voiding Method Toilet Toilet Toilet


 


  # Voids 2  


 


  Weight 63.049 kg  














- Constitutional


General appearance: average body habitus, cooperative, no acute distress





- EENT


Eyes: anicteric sclerae, PERRLA, normal appearance


ENT: normal oropharynx





- Neck


Neck: no lymphadenopathy





- Respiratory


Respiratory: bilateral: CTA





- Cardiovascular


Heart sounds: normal: S1, S2


  ** leg


Peripheral Edema: bilateral: None





- Gastrointestinal


General gastrointestinal: no absent bowel sounds, no decreased bowel sounds, no 

distended, no hepatomegaly, no hyperactive bowel sounds, normal bowel sounds, 

no organomegaly, no rigid, no scaphoid, soft, no splenomegaly, no tenderness, 

no umbilical hernia, no ventral hernia





- Integumentary


Integumentary: normal





- Neurologic


Neurologic: CNII-XII intact





- Musculoskeletal


Musculoskeletal: strength equal bilaterally





- Psychiatric


Psychiatric: A&O x's 3, appropriate affect, intact judgment & insight





Results


CBC & Chem 7: 


 17 07:38





 17 07:38


Labs: 


 Abnormal Lab Results - Last 24 Hours (Table)











  17 Range/Units





  07:38 07:38 


 


WBC  11.9 H   (3.8-10.6)  k/uL


 


RBC  3.18 L   (3.80-5.40)  m/uL


 


Hgb  10.0 L D   (11.4-16.0)  gm/dL


 


Hct  30.6 L   (34.0-46.0)  %


 


Neutrophils #  9.7 H   (1.3-7.7)  k/uL


 


Carbon Dioxide   21 L  (22-30)  mmol/L


 


BUN   27 H  (7-17)  mg/dL


 


Calcium   7.8 L  (8.4-10.2)  mg/dL


 


Total Protein   4.8 L  (6.3-8.2)  g/dL


 


Albumin   2.3 L  (3.5-5.0)  g/dL











CT scan - abdomen: report reviewed


CT scan - pelvis: report reviewed





Assessment and Plan


(1) Ovarian cancer


Narrative/Plan: 


From pt description it does sound as though she has malignancy, wether this is 

recurrent or a new primary is not known, Dr. Vale will look for reports from Dr. Eden.  Pt typically receives her chemo locally so we will be updated in the 

plan of care.  Treatment cannot be started for about 4-6 weeks post op to allow 

time for healing.  We will f/u with pt in a few weeks in the outpatient 

setting.  


Status: Acute   


Plan: 





Suspect possible partial ileus post op, UA suspicious.  Pt being followed by IM 

and Surgery

## 2017-03-20 NOTE — HP
DATE OF ADMISSION:  03/19/2017



CHIEF COMPLAINT:  Abdominal pain, nausea, vomiting, and diarrhea, 

shaking, chills.  



HISTORY OF PRESENT ILLNESS: This is 76 -year-old woman with past 

medical history of adenocarcinoma of the ovaries, history of gastric 

ulcer, history of osteoporosis, history of chemotherapy being followed 

by Dr. Lay in the outpatient setting, also seeing Dr. Vale and Dr. Eden from the [facility].  The patient underwent second surgery 

but 2 weeks ago where the patient underwent bowel resection and other 

surgical procedures. The details are not available at this time.  

Patient was feeling well, but since for the last 2 days the patient 

had nausea, vomiting, abdominal pain and some diarrhea. The patient 

had shaking and chills and patient also repeated episodes of vomiting 

so the patient came to  [facility] and was admitted to the 

hospital for further evaluation and treatment.   A CAT scan of the 

abdomen and pelvis was done, which showed a new small moderate amount 

of abdominal pelvic ascites and liver and small ascites also, 

cirrhosis also a consideration because of lobulated contour.  

Otherwise, no bowel obstruction, but abdominal wall thickening of the 

proximal colon near sutures indicating inflammatory infectious 

process. There is no history of any fever. No history of headache, 

loss of consciousness or seizures.  



PAST MEDICAL HISTORY:  History of recent surgery as mentioned earlier, 

 CHF, history of GERD, hypertension, history of gastric ulcer, history 

of anxiety.  History of hernia, hysterectomy.  



MEDICATIONS: Home medications prior to admission include: 

1. Multivitamin 1 p.o. daily. 

2. Norco 5 mg q.4 p.r.n. 

3. Lovenox 40 mg subcu daily.

4. Toprol-XL 25 mg daily.

5. Lexapro 10 mg q.h.s. 

6. Vitamin D3 1000 daily.

7.  Xanax 0.25 t.i.d. p.r.n. 



ALLERGIES: None. 



FAMILY HISTORY: History of cancer, seizures, CHF in the family. 



SOCIAL HISTORY:  No history of smoking, no history of alcohol intake. 



REVIEW OF SYSTEMS:

ENT: No diminished hearing. No diminished vision. 

CARDIOVASCULAR: No angina or palpitations.  Otherwise as mentioned 

earlier. 

RESPIRATORY: As mentioned earlier. 

GASTROINTESTINAL: As mentioned earlier. 

GENITOURINARY: No dysuria or hematuria. 

Nervous system: No numbness or weakness. 

ALLERGY/IMMUNOLOGY: No asthma or hayfever. 

Musculoskeletal as mentioned earlier.

HEMATOLOGY/ONCOLOGY: No history of anemia. 

ENDOCRINE: No history of diabetes, hypothyroidism. 

CONSTITUTIONAL: As mentioned earlier.

DERMATOLOGY: Negative.

RHEUMATOLOGY: Negative.

PSYCHIATRY: as mentioned earlier.



PHYSICAL EXAMINATION: The patient is alert and oriented times three.  

Pulse 105, blood pressure 120/42, respirations 18, temperature 97.8, 

pulse ox 94% on room air.  

HEENT: Conjunctivae normal.  Oral mucosa moist. 

NECK: No jugular venous distention.  No carotid bruit.  No lymph node 

enlargement.  

CARDIOVASCULAR: S1, S2 muffled.   No S3, no S4. 

RESPIRATORY: Breath sounds diminished at the bases. A few scattered 

rhonchi and crackles.  

ABDOMEN: Soft, diffuse distention present. Abdominal laparotomy wound 

with staples in place present.  Mild diffuse tenderness. No guarding. 

No rigidity.  No mass palpable.  Bowel sounds diminished. Legs: No 

edema, no swelling. Nervous system: Higher functions as mentioned 

earlier.  Moves all four limbs. No focal deficits.  

LYMPHATICS: No lymph nodes palpable in the neck, axillae or groin.  

SKIN: No ulcer, rash or bleeding. 



LABS: WBC 19.4. Sodium 117, potassium 4.3, creatinine is 1.62.  UA 

noted. Possible urinary tract infection. 



ASSESSMENT: 

1. Possible urinary tract infection with sepsis. 

2. Ascites, ascitic fluid in the abdominal cavity on the CAT scan.

3. Mild acute renal failure. 

4. Increased WBC, possible sepsis. 

5. Mild hypoalbuminemia. 

6. History of recent surgery and bowel resection. 

7. History of ovarian cancer. 

8. History of congestive heart failure, ejection fraction unknown.  

9. History of gastroesophageal reflux disease. 

10. History of essential hypertension. 

11. History of gastric ulcer. 

12. Osteoporosis.

13. History of degenerative joint disease. 

14. History of ovarian cancer with chemo and surgery. 

15. History of shingles. 

16. History of abdominal hernia repair. 

17. History of anxiety. 

18. FULL CODE. 



RECOMMENDATIONS AND DISCUSSION: In this 76-year-old woman who 

presented with multiple complex medical issues, we will monitor the 

patient closely. Continue the current medications. Continue 

symptomatic treatment. Otherwise, at this time, I would recommend keep 

the patient n.p.o. and cautious IV fluids.  CT scan noted. I would 

also recommend surgical evaluation and consult Dr. Vale. Broad-spectrum 

IV antibiotics will be initiated. I would also recommend infectious 

disease evaluation as well. Prognosis guarded because of multiple 

complex medical issues.  Further recommendations to follow.  A copy of 

dictation being forwarded to Dr. Lay who is the primary physician.

## 2017-03-20 NOTE — P.PN
Subjective


Principal diagnosis: 





Nausea vomiting abdominal pain





Patient is seen today.  The consultation from Dr. Thorpe was reviewed.  She 

states she is feeling better.  No further diarrhea.  Her nausea is improved.  

She is tolerating water at this time.  She still describes mild mid abdominal 

discomfort.  T-max 99.3.  Tachycardia that she had on arrival seems improved.  

White blood cell count 11.9.  Hemoglobin 10 from 12.





Objective





- Vital Signs


Vital signs: 


 Vital Signs











Temp  98.0 F   03/20/17 07:00


 


Pulse  81   03/20/17 07:00


 


Resp  18   03/20/17 07:00


 


BP  124/62   03/20/17 07:00


 


Pulse Ox  92 L  03/20/17 07:00








 Intake & Output











 03/19/17 03/20/17 03/20/17





 18:59 06:59 18:59


 


Intake Total  740 


 


Balance  740 


 


Weight 63.049 kg 63.049 kg 


 


Intake:   


 


  IV  500 


 


    Sodium Chloride 0.9% 1,  500 





    000 ml @ 50 mls/hr IV .   





    Q20H ANUPAM Rx#:[id number]   


 


  Oral  240 


 


Other:   


 


  Voiding Method  Toilet Toilet


 


  # Voids  2 














- Exam





Abdomen: Soft, mild diffuse tenderness slightly increased right mid abdomen, no 

rebound or guarding, surgical incision with staples intact and without drainage





- Labs


CBC & Chem 7: 


 03/20/17 07:38





 03/20/17 07:38


Labs: 


 Abnormal Lab Results - Last 24 Hours (Table)











  03/20/17 03/20/17 Range/Units





  07:38 07:38 


 


WBC  11.9 H   (3.8-10.6)  k/uL


 


RBC  3.18 L   (3.80-5.40)  m/uL


 


Hgb  10.0 L D   (11.4-16.0)  gm/dL


 


Hct  30.6 L   (34.0-46.0)  %


 


Neutrophils #  9.7 H   (1.3-7.7)  k/uL


 


Carbon Dioxide   21 L  (22-30)  mmol/L


 


BUN   27 H  (7-17)  mg/dL


 


Calcium   7.8 L  (8.4-10.2)  mg/dL


 


Total Protein   4.8 L  (6.3-8.2)  g/dL


 


Albumin   2.3 L  (3.5-5.0)  g/dL














Assessment and Plan


(1) Abdominal pain


Narrative/Plan: 


Patient with abdominal pain nausea vomiting and diarrhea post colectomy.  

Consider transfer back to the tertiary care center.  The patient's ascites is 

likely on the basis of her underlying malignancy however other etiologies 

remain within the differential including anastomotic leak.  The patient 

clinically seems to be improving and her abdominal examination does not reveal 

peritonitis.  We'll continue to follow closely with you however immediate 

postop patient such as this is always better observed by the surgical team 

directly involved in her recent care.


Status: Acute

## 2017-03-21 LAB
ALP SERPL-CCNC: 58 U/L (ref 38–126)
ALT SERPL-CCNC: 31 U/L (ref 9–52)
ANION GAP SERPL CALC-SCNC: 11 MMOL/L
AST SERPL-CCNC: 23 U/L (ref 14–36)
BASOPHILS # BLD AUTO: 0.2 K/UL (ref 0–0.2)
BASOPHILS NFR BLD AUTO: 2 %
BUN SERPL-SCNC: 17 MG/DL (ref 7–17)
CALCIUM SPEC-MCNC: 7.7 MG/DL (ref 8.4–10.2)
CH: 30
CHCM: 31.2
CHLORIDE SERPL-SCNC: 105 MMOL/L (ref 98–107)
CO2 SERPL-SCNC: 20 MMOL/L (ref 22–30)
EOSINOPHIL # BLD AUTO: 0.1 K/UL (ref 0–0.7)
EOSINOPHIL NFR BLD AUTO: 1 %
ERYTHROCYTE [DISTWIDTH] IN BLOOD BY AUTOMATED COUNT: 3.28 M/UL (ref 3.8–5.4)
ERYTHROCYTE [DISTWIDTH] IN BLOOD: 12.1 % (ref 11.5–15.5)
GLUCOSE SERPL-MCNC: 95 MG/DL (ref 74–99)
HCT VFR BLD AUTO: 31.6 % (ref 34–46)
HDW: 2.54
HGB BLD-MCNC: 10.1 GM/DL (ref 11.4–16)
LUC NFR BLD AUTO: 3 %
LYMPHOCYTES # SPEC AUTO: 0.5 K/UL (ref 1–4.8)
LYMPHOCYTES NFR SPEC AUTO: 5 %
MCH RBC QN AUTO: 30.7 PG (ref 25–35)
MCHC RBC AUTO-ENTMCNC: 31.9 G/DL (ref 31–37)
MCV RBC AUTO: 96.3 FL (ref 80–100)
MONOCYTES # BLD AUTO: 0.8 K/UL (ref 0–1)
MONOCYTES NFR BLD AUTO: 8 %
NEUTROPHILS # BLD AUTO: 8.3 K/UL (ref 1.3–7.7)
NEUTROPHILS NFR BLD AUTO: 83 %
NON-AFRICAN AMERICAN GFR(MDRD): >60
POTASSIUM SERPL-SCNC: 3.5 MMOL/L (ref 3.5–5.1)
PROT SERPL-MCNC: 4.7 G/DL (ref 6.3–8.2)
SODIUM SERPL-SCNC: 136 MMOL/L (ref 137–145)
WBC # BLD AUTO: 0.28 10*3/UL
WBC # BLD AUTO: 10.1 K/UL (ref 3.8–10.6)
WBC (PEROX): 11.04

## 2017-03-21 RX ADMIN — HYDROCODONE BITARTRATE AND ACETAMINOPHEN PRN EACH: 5; 325 TABLET ORAL at 12:15

## 2017-03-21 RX ADMIN — PANTOPRAZOLE SODIUM SCH MG: 40 TABLET, DELAYED RELEASE ORAL at 17:24

## 2017-03-21 RX ADMIN — CEFAZOLIN SCH MLS/HR: 330 INJECTION, POWDER, FOR SOLUTION INTRAMUSCULAR; INTRAVENOUS at 23:24

## 2017-03-21 RX ADMIN — PANTOPRAZOLE SODIUM SCH MG: 40 INJECTION, POWDER, FOR SOLUTION INTRAVENOUS at 07:20

## 2017-03-21 RX ADMIN — HYDROMORPHONE HYDROCHLORIDE PRN MG: 1 INJECTION, SOLUTION INTRAMUSCULAR; INTRAVENOUS; SUBCUTANEOUS at 02:26

## 2017-03-21 RX ADMIN — IOHEXOL PRN ML: 350 INJECTION, SOLUTION INTRAVENOUS at 12:15

## 2017-03-21 RX ADMIN — METOPROLOL SUCCINATE SCH MG: 25 TABLET, EXTENDED RELEASE ORAL at 07:21

## 2017-03-21 RX ADMIN — DEXTROSE MONOHYDRATE SCH MLS/HR: 5 INJECTION, SOLUTION INTRAVENOUS at 23:21

## 2017-03-21 RX ADMIN — Medication SCH UNIT: at 07:19

## 2017-03-21 RX ADMIN — THERA TABS SCH EACH: TAB at 12:16

## 2017-03-21 RX ADMIN — DEXTROSE MONOHYDRATE SCH MLS/HR: 5 INJECTION, SOLUTION INTRAVENOUS at 00:02

## 2017-03-21 RX ADMIN — DEXTROSE MONOHYDRATE SCH MLS/HR: 5 INJECTION, SOLUTION INTRAVENOUS at 07:19

## 2017-03-21 RX ADMIN — IOHEXOL PRN ML: 350 INJECTION, SOLUTION INTRAVENOUS at 13:17

## 2017-03-21 RX ADMIN — HYDROCODONE BITARTRATE AND ACETAMINOPHEN PRN EACH: 5; 325 TABLET ORAL at 07:20

## 2017-03-21 RX ADMIN — ENOXAPARIN SODIUM SCH MG: 40 INJECTION SUBCUTANEOUS at 07:19

## 2017-03-21 RX ADMIN — CEFAZOLIN SCH MLS/HR: 330 INJECTION, POWDER, FOR SOLUTION INTRAMUSCULAR; INTRAVENOUS at 00:03

## 2017-03-21 RX ADMIN — DEXTROSE MONOHYDRATE SCH MLS/HR: 5 INJECTION, SOLUTION INTRAVENOUS at 17:22

## 2017-03-21 RX ADMIN — ESCITALOPRAM OXALATE SCH MG: 10 TABLET, FILM COATED ORAL at 20:25

## 2017-03-21 RX ADMIN — HYDROCODONE BITARTRATE AND ACETAMINOPHEN PRN EACH: 5; 325 TABLET ORAL at 17:22

## 2017-03-21 NOTE — CT
EXAMINATION TYPE: CT abdomen pelvis w con

 

DATE OF EXAM: 3/21/2017 2:15 PM

 

COMPARISON: NONE

 

HISTORY: Pain, history of bowel resection and ovarian CA

 

CT DLP: 583.7 mGycm

Automated exposure control for dose reduction was used.

 

TECHNIQUE:  Helical acquisition of images was performed from the lung bases through the pelvis.

 

CONTRAST: 

Performed with Oral Contrast and with IV Contrast, patient injected with 100 mL of Omnipaque 300.

 

FINDINGS: 

 

LUNG BASES: Similar to prior exam there are bilateral pleural effusions and associated atelectasis.

 

LIVER/GB: Stable compared to prior

 

PANCREAS: No significant abnormality is seen.

 

SPLEEN: There is a calcification is noted on previous exam likely due to old granulomatous disease.

 

ADRENALS: No significant abnormality is seen.

 

KIDNEYS: No significant abnormality is seen.

 

 

RETROPERITONEAL ADENOPATHY:  None visualized

 

REPRODUCTIVE ORGANS: Patient is post hysterectomy, ovaries are not seen

 

URINARY BLADDER:  No significant abnormality is seen.

 

PELVIC ADENOPATHY:  None visualized.

 

OSSEOUS STRUCTURES:  Degenerative disc disease, facet arthropathy changes again noted.

 

BOWEL:  There are loops show wall thickening, postop changes are present.: Wall thickening, diverticu
lar change again noted and is indeterminate.

 

OTHER: Patient's ascites is likely loculated. Additionally there is some air present within the nonde
pendent portion of the ascites at the mid abdomen level which is a new finding. Postop changes are pr
esent, there are surgical clips present within the abdomen. There is a hiatal hernia.

 

IMPRESSION: 

CORRELATE FOR POSSIBLE INSTRUMENTATION CAUSING AIR WITHIN THE ASCITES, THE ASCITES APPEARS LOCULATED,
 THERE IS AN ENHANCING WALL, CORRELATE FOR POSSIBLE PERITONITIS. BOWEL WALL THICKENING COULD BE DUE T
O ENTERITIS OR INFECTIOUS ETIOLOGY OR ALTERNATIVELY HYPOPROTEINEMIA, DUE TO PATIENT'S ASCITES.

## 2017-03-21 NOTE — PN
DATE OF SERVICE:  03/21/2017



This 76-year-old woman who was admitted with UTI also noted to have 

some  fluid in the CAT scan. Dr. Arnold is following the patient. The 

patient on broad spectrum IV antibiotics. The cultures are negative so 

far.  The patient has got appointment with Dr. Eden who did the 

surgery as an outpatient per family.  



Past medical history reviewed. 





REVIEW OF SYSTEMS: 

CARDIOVASCULAR: No angina or palpitations. 

RESPIRATORY: As mentioned earlier. 

GASTROINTESTINAL: As mentioned earlier.

GENITOURINARY: No dysuria. 

CENTRAL NERVOUS SYSTEM: No numbness, weakness. 



Current medications are reviewed and include: 

1. Tylenol 650 q.6 p.r.n. 

2. Norco 5 mg q.4 p.r.n. 

3. Xanax 0.5 t.i.d. 

4. Vitamin D3 1000 daily.

5. Lovenox 40 mg subcu daily.

6. Lexapro 10 mg.

7. Dilaudid 0.5 mg q6h p.r.n.   

8. Omnipack. 

9. Toprol-XL. 

10. Multivitamins. 

11. Narcan. 

12. Protonix. 

13. Zosyn  IV q.8.

14. Restoril 15 mg q.h.s. p.r.n. 



PHYSICAL EXAMINATION: The patient is alert and oriented times three. 

Pulse 78.  Blood pressure 108/57, respiration 18, temperature 98.7, 

pulse ox 93% on room air.  

HEENT: Conjunctivae normal.  

NECK: No jugular venous distention.  

CARDIOVASCULAR: S1, S2 muffled. No S3, no S4. 

RESPIRATORY: Breath sounds diminished at the bases. A few scattered 

rhonchi and no crackles.  

ABDOMEN: Soft, status post surgery. Staples were removed otherwise, 

bowel sounds present.  Mild diffuse discomfort , no guarding, no 

rigidity. No mass palpable.  

CENTRAL NERVOUS SYSTEM: No focal deficits. 

LEGS: No edema. No swelling. 



Labs are WBC 10.1, hemoglobin is 10.1. Otherwise, sodium is 136, 

albumin is 2.3. The cultures are negative so far.  



ASSESSMENT:

1. Possible urinary tract infection with sepsis, present on admission. 

2. Ascites in the abdominal cavity on CAT scan, possibly secondary to 

malignancy.  

3. Recent abdominal surgery for ovarian cancer. 

4. Mild acute renal failure, possible prerenal with dehydration 

present on admission.  

5. Increased WBC, possibly secondary to sepsis, present on admission. 

6. Mild hypoalbuminemia with mild protein calorie malnutrition. 

7. History of ovarian cancer. 

8. History of congestive heart failure, ejection fraction unknown. 

9. History of gastroesophageal reflux disease.

10. History of essential hypertension.

11. History of gastric ulcer.

12. History of degenerative joint disease.

13. History of ovarian cancer with chemotherapy and surgery. 

14. History of shingles.

15. History of abdominal hernia repair. 

16. History of anxiety.

17. FULL CODE.



RECOMMENDATIONS AND DISCUSSION: Recommend to continue current 

medications, continue symptomatic treatment.  Continue broad spectrum 

IV antibiotics.  Dr. Arnold's input appreciated. I would also recommend 

The white count is improved significantly, but however, the 

patient is feeling better,  not completely; and however, the patient 

is able to keep some food down.  I would recommend CAT scan of the 

abdomen and pelvis with contrast to rule out any possible leak and as 

patient indicated, the patient did have significant ascites 

previously. The ascitic fluid currently noted could be remnant of the 

previous foods also but in any case, we will follow the patient 

closely, because of multiple complex medical issues. Further 

recommendations to follow.  



MTDD

## 2017-03-21 NOTE — DS
DATE OF ADMISSION:   03/19/2017

DATE OF DISCHARGE:   



FINAL DIAGNOSES:

1. Ascitic and abdominal cavity fluid with air to rule out bowel leak. 

2. Possible urinary tract infection with sepsis present on admission. 

3. History of recent surgery for ovarian cancer. 

4. Mild acute renal failure, possible prerenal failure, on present on 

admission.  

5. Increased WBC, possibly secondary to sepsis, present on admission. 

6. Mild hypoalbuminemia. 

7. History of ovarian cancer. 

8. History of congestive heart failure, ejection fraction unknown. 

9. Gastroesophageal reflux disease. 

10. History of essential hypertension.

11. History of gastric ulcer.

12. History of degenerative joint disease. 

13. History of ovarian cancer with chemotherapy and surgery. 

14. History of shingles.

15. History of abdominal hernia repair. 

16. History of anxiety. 

17. FULL CODE. 



DISCHARGE DISPOSITION: The patient will be discharged in a stable 

condition with guarded prognosis.  The patient will be transferred in 

stable condition with guarded prognosis to [facility].  



The time taken 35 minutes. 



HISTORY OF PRESENT ILLNESS: This 76 -year-old woman with past medical 

history of multiple medical problems being followed by Dr. Lay in 

the outpatient setting was admitted with features of possible urinary 

tract infection and sepsis. The patient also found to have fluid in 

the abdominal cavity was thought to be part of the ascites. Initially 

the patient had high white count initially. With empiric antibiotics, 

the patient did improve but subsequently per Dr. Simmons's report, 

repeat CT scan showed suspicion for air and possibility of bowel leak 

was considered discussed and I discussed the case with fellow, Dr. Eden and Dr. Valdes and the patient will be transferred to [facility] in stable condition with guarded prognosis for further 

evaluation and treatment.  



Currently vital signs are stable. 

CARDIOVASCULAR SYSTEM: S1, S2 muffled. 

ABDOMEN: Soft. Mild diffuse distention. No guarding or rigidity.   No 

tenderness. Bowel sounds present. No ascites clinically.   



WBC normalized at 10.1, hemoglobin is 10.1. Sodium is 136.



The current medications are as follows: Medications:

1. Tylenol p.r.n. 

2. Norco 5 mg q.4 p.r.n. 

3. Xanax 0.25 t.i.d. 

4. Vitamin D3 1000 p.o. daily. 

5. Lovenox 40 mg subcutaneous daily.

6. Lexapro 10 mg q.h.s. 

7. Dilaudid 0.5 q6h p.r.n.  

8. Toprol XL 25 mg daily. 

9. Multivitamins one p.o. daily. 

10. Narcan.

11. Zofran 4 mg p.r.n.  

12. Protonix 40 mg b.i.d. 

13. Zosyn 3.37 IV q.8. 

14. Restoril 50 mg p.r.n.

## 2017-03-21 NOTE — CT
EXAMINATION TYPE: CT abdomen pelvis wo con

 

DATE OF EXAM: 3/21/2017 10:03 PM

 

COMPARISON: Today

 

HISTORY: Follow-up study.  Possible enteritis or peritonitis.

 

CT DLP: 373.00 mGycm

Automated exposure control for dose reduction was used.

 

TECHNIQUE:  Helical acquisition of images was performed from the lung bases through the pelvis.

 

FINDINGS: 

 

There are bilateral pleural effusions and larger on the left side. There is patchy left lower lobe co
nsolidation and atelectasis.

 

There is a large hiatal hernia. There is free fluid throughout the abdomen. Liver shows no focal defe
ct. Bile ducts are not dilated. Gallbladder appears normal. There is no sign of pancreatic mass. Ther
e are small pancreatic calcifications noted. Spleen shows small calcification.

 

There is some residual IV contrast in the renal collecting systems. There is no hydronephrosis. There
 is no sign of retroperitoneal adenopathy. There is no evidence of a bowel obstruction. There is a sm
all pneumoperitoneum. There is some oral contrast in the colon. There are some sigmoid diverticula. T
here is no evidence of diverticulitis. Bladder distends smoothly.:

 

IMPRESSION: 

THERE IS MODERATE ASCITES FLUID. THIS IS UNCHANGED. STABLE SMALL PNEUMOPERITONEUM. THERE HAS BEEN MOV
EMENT OF THE ORAL CONTRAST TO THE LEFT COLON SINCE THE EXAM EARLIER TODAY AT 2:00 PM. THERE IS SIGMOI
D DIVERTICULOSIS WITHOUT SIGN OF DIVERTICULITIS.

 

STABLE BILATERAL PLEURAL EFFUSIONS AND BASILAR PULMONARY INFILTRATES AND ATELECTASIS. NO SIGN OF A GERSON
WEL OBSTRUCTION.

 

HIATAL HERNIA.

## 2017-03-21 NOTE — PN
DATE OF SERVICE: 03/20/2017 



This 76-year-old woman who was admitted with UTI with sepsis also had 

ascites in the CAT scan. The patient also had recent surgery by Dr. Eden regarding ovarian carcinoma. The patient had significant 

vomiting prior to admission and also maybe some coffee-ground vomitus 

also. Currently the patient is able to tolerate some diet. The patient 

is on clear liquids. Surgery, Dr. Simmons, is following the patient 

closely.  



PAST MEDICAL HISTORY: Reviewed. 



REVIEW OF SYSTEMS: 

CARDIOVASCULAR: No angina.

RESPIRATORY: As mentioned earlier. 

GI: No nausea. 

: No dysuria. 

NERVOUS SYSTEM: No numbness, weakness. 



Current medications are reviewed and include: 

1. Tylenol 650 q.6 p.r.n. 

2. Xanax 0.5 t.i.d. 

3. Vitamin D3 1000 daily.

4. Lovenox 40 mg  

5. Lexapro.

6. Dilaudid 0.5 q.6 p.r.n. 

7. Toprol XL 25 mg daily. 

8. Multivitamins 1 p.o. daily.

9. Narcan 0.2 q.2 p.r.n.  

10. Zofran.

11. Protonix 40 mg.

12. Zosyn IV.

13. Zestril. 



PHYSICAL EXAMINATION: Patient is alert and oriented x3. Pulse 81, 

blood pressure 125/62, respirations 18, temperature 98 degrees, pulse 

ox 90% on room air.  

HEENT:  Conjunctivae normal.  

NECK:  No jugular venous distention. 

CARDIOVASCULAR: S1 and S2. 

RESPIRATORY:  Breath sounds diminished at the bases.  No rhonchi, no 

crackles.  

ABDOMEN:  Soft, mild diffuse discomfort to palpation. Mild distention. 

Otherwise status post recent surgery. Bowel sounds present.  

LEGS: No edema, no swelling.  

NERVOUS SYSTEM:  Higher function as mentioned. Moves all four limbs. 

No focal motor deficits.   

LYMPHATIC: No lymphadenopathy in the neck, axillae or groin. 

SKIN: No ulcer, rash or bleeding. 

 

LABS: WBC 11, hemoglobin 10. The other labs are albumin is 2.3. Other 

labs are noted. Cultures are pending at this time.  



ASSESSMENT: 

1. Possible urinary tract infection with sepsis, present on admission. 

2. Ascites in the abdominal cavity and CAT scan, possibly secondary to 

malignancy.  

3. Recent abdominal surgery for ovarian cancer. 

4. Mild acute renal failure, possible prerenal increased.

5. Increased WBC secondary to possible sepsis, present on admission. 

6. Mild hypoalbuminemia. 

7. History of ovarian cancer. 

8. History of congestive heart failure with ejection fraction unknown. 

9. History of gastroesophageal reflux disease. 

10. History of essential hypertension. 

11. History of gastric ulcer 

12. History of degenerative joint disease.

13. History of ovarian cancer with chemotherapy and surgery.

14. History of shingles.

15. History of abdominal hernia repair. 

16. History of anxiety.

17. FULL CODE.



RECOMMENDATIONS AND DISCUSSION: In this 76-year-old woman who 

presented with multiple complex medical issues, will monitor the 

patient closely. Continue the current medications, symptomatic 

treatment. Otherwise at this time I would recommend continue with 

broad-spectrum IV antibiotics. Continue with the current medications.  

Follow the cultures. Symptomatic treatment. Follow closely with 

Surgery. Will discuss with Dr. Simmons. Prognosis guarded. Further 

recommendations to follow. Creatinine has improved significantly 

indicating severe dehydration secondary to renal failure. Further 

recommendations to follow. Prognosis guarded.  



MTDD

## 2017-03-21 NOTE — PN
DATE OF SERVICE:  03/20/2017



Reason for consultation is sepsis. 



HISTORY OF PRESENT ILLNESS: The patient is a 76-year-old  

female with a recent diagnosis of colon cancer for which the patient 

did have a colectomy about 2 weeks ago with end-to-end anastomosis. 

Patient says she was doing well. However, Friday morning the patient 

woke up and did have multiple episodes of vomiting, did have loose 

stools as well. Patient has pain mostly in the upper abdominal area 

from all this vomiting, but no significant lower abdominal pain. The 

patient say that she has some rigors or chills at home, but no 

high-grade fever has been recorded with worsening and persistent 

symptoms. The patient subsequently was brought into the [facility] ER where the patient had been evaluated by the ER physician. The 

patient did have CBC with a white count of 19.4. Her UA has been 

positive. She has had a CT of abdomen and pelvis that was reported to 

have some inflammation at the anastomosis site, but no leak was 

noticed as no contrast was given. Patient was admitted to hospital, 

started on Zosyn. ID was consulted for further recommendation 

regarding antibiotic therapy. Patient, though did have some running of 

fever has significant frequency prior to coming to the hospital.  Did 

have some frequency in cough, the patient started on IV fluids.  



REVIEW OF SYSTEMS: 

CONSTITUTIONAL: Positive for weakness and some chills. 

EYES: No complaint. 

ENT: No complaint. 

RESPIRATORY: As per HPI. 

CARDIOVASCULAR: No complaint. 

GENITOURINARY:  As per HPI.

GASTROINTESTINAL: As per HPI. 

MUSCULOSKELETAL: No complaint. 

INTEGUMENTARY:  No complaint.

PSYCHOLOGICAL:  No complaint.

ENDOCRINE:  No complaint.

NEUROLOGICAL:  No complaint. 



Past medical history significant for colon cancer, also with a history 

of ovarian cancer, gastroesophageal reflux disease, heart failure, 

hypertension, osteoporosis, peptic ulcer disease.  



Past surgical history is significant for hysterectomy, hernia repair, 

partial colectomy.  



SOCIAL HISTORY: No history of smoking, drinking, or drug use. 



FAMILY HISTORY: Father with history of rheumatic fever and congestive 

heart failure. Mother with history of congestive heart failure as 

well.  



ALLERGIES: No known drug allergies. 



Medications currently include the patient is on Tylenol, Xanax, 

vitamin D3, Lovenox, Lexapro, Dilaudid, Toprol-XL, Narcan, Zofran, 

tazobactam.  



On examination, blood pressure is 124/62 with a pulse of 81, 

temperature 98. She is 92% on room air. General description is an 

elderly female, lying in bed in no distress. No tachypnea or accessory 

muscle of respiration use.  

HEENT examination shows no pallor or scleral icterus, oral mucous 

membranes is dry.  

NECK: Trachea central. There is no thyromegaly. 

LUNGS: Unlabored breathing. Clear to auscultation anteriorly. No 

wheeze or crackle.  

HEART: S1, S2, regular rate and rhythm. 

ABDOMEN: Soft incision is clean, slight tenderness but no guarding, no 

rigidity.  

EXTREMITIES:  No edema of the feet. 

SKIN EXAMINATION: No rash or mass palpable. 

NEUROLOGICAL: The patient is awake, alert, oriented x3. Mood and 

affect normal.  



LABS: Hemoglobin is 10, with a white count of 11.9, admission white 

count was 19.4 with a BUN of 27, creatinine 0.74, UA has been 

positive. Cultures are currently pending.  



DIAGNOSTIC IMPRESSION AND PLAN: Patient admitted to the hospital with 

significant nausea and vomiting and did have a loose stool.  the 

patient did have recent colectomy with end-to-end anastomosis with 

some inflammation seen at the site of inflammation. Unfortunately, no 

contrast was given to evaluate for any leak.  Possible leak would 

be one consideration; however, in view of significant diarrhea and 

inflammation, an infectious colitis needs to be ruled out as well.  In 

addition to the patient does have significantly positive UA, but no 

significant urinary symptoms, a urinary tract infection cannot be entirely 

excluded, likely from enteric gram-negative pathogen.  



PLAN: 

1. Will obtain stool studies including culture as well as C. diff. 

2. Continue patient on Zosyn 4.5 gm q.8 hour and IV fluid. 

3. If the symptoms do not improve , would recommend obtaining CT 

abdomen and pelvis with oral contrast to evaluate for a leakage.  

4. Will follow up on the clinical condition and cultures to further 

adjust the medication if needed. 



 Thank you for this consultation. Will follow this patient along with 

you.  



NELSON

## 2017-03-21 NOTE — P.PN
Subjective


Principal diagnosis: 





Nausea vomiting abdominal pain





patient apparently feels better than she did yesterday.  She had a low-grade 

temp of 99.9.  Her white blood cell count is improved at 10.1.  A repeat CAT 

scan of the abdomen and pelvis was ordered which did show evidence of free 

fluid and also some loculated air collections within the free fluid in the 

upper mid abdomen.  Looking back at her initial CAT scan there was a small 

amount of air that was initially thought to represent a bowel loop in that 

vicinity although I do believe the volume of air is slightly increased.  

Attempts currently are being made for the patient be transferred back to [facility] where her initial surgery took place.





Objective





- Vital Signs


Vital signs: 


 Vital Signs











Temp  98.6 F   03/21/17 15:00


 


Pulse  78   03/21/17 15:00


 


Resp  18   03/21/17 15:00


 


BP  108/57   03/21/17 15:00


 


Pulse Ox  93 L  03/21/17 15:00








 Intake & Output











 03/21/17 03/21/17 03/22/17





 06:59 18:59 06:59


 


Intake Total 740 400 


 


Balance 740 400 


 


Intake:   


 


   400 


 


    Sodium Chloride 0.9% 1, 500 400 





    000 ml @ 50 mls/hr IV .   





    Q20H [facility] Rx#:[id number]   


 


  Oral 240  


 


Other:   


 


  Voiding Method Toilet Toilet 


 


  # Voids 2 2 


 


  # Bowel Movements 1 1 














- Exam





abdomen: Soft, slightly distended, mild diffuse tenderness





- Labs


CBC & Chem 7: 


 03/21/17 07:23





 03/21/17 09:01


Labs: 


 Abnormal Lab Results - Last 24 Hours (Table)











  03/21/17 03/21/17 Range/Units





  07:23 09:01 


 


RBC  3.28 L   (3.80-5.40)  m/uL


 


Hgb  10.1 L   (11.4-16.0)  gm/dL


 


Hct  31.6 L   (34.0-46.0)  %


 


Neutrophils #  8.3 H   (1.3-7.7)  k/uL


 


Lymphocytes #  0.5 L   (1.0-4.8)  k/uL


 


Sodium   136 L  (137-145)  mmol/L


 


Carbon Dioxide   20 L  (22-30)  mmol/L


 


Calcium   7.7 L  (8.4-10.2)  mg/dL


 


Total Protein   4.7 L  (6.3-8.2)  g/dL


 


Albumin   2.3 L  (3.5-5.0)  g/dL








 Microbiology - Last 24 Hours (Table)











 03/20/17 16:30 Stool Culture - Preliminary





 Stool 














Assessment and Plan


(1) Abdominal pain


Narrative/Plan: 


agree with plans for transfer.  This was discussed with the patient in detail.


Status: Acute

## 2017-03-22 LAB
ALP SERPL-CCNC: 58 U/L (ref 38–126)
ALT SERPL-CCNC: 35 U/L (ref 9–52)
ANION GAP SERPL CALC-SCNC: 11 MMOL/L
AST SERPL-CCNC: 30 U/L (ref 14–36)
BASOPHILS # BLD AUTO: 0 K/UL (ref 0–0.2)
BASOPHILS NFR BLD AUTO: 0 %
BUN SERPL-SCNC: 11 MG/DL (ref 7–17)
CALCIUM SPEC-MCNC: 8 MG/DL (ref 8.4–10.2)
CH: 30
CHCM: 31.8
CHLORIDE SERPL-SCNC: 103 MMOL/L (ref 98–107)
CO2 SERPL-SCNC: 23 MMOL/L (ref 22–30)
EOSINOPHIL # BLD AUTO: 0.1 K/UL (ref 0–0.7)
EOSINOPHIL NFR BLD AUTO: 1 %
ERYTHROCYTE [DISTWIDTH] IN BLOOD BY AUTOMATED COUNT: 3.22 M/UL (ref 3.8–5.4)
ERYTHROCYTE [DISTWIDTH] IN BLOOD: 12 % (ref 11.5–15.5)
GLUCOSE SERPL-MCNC: 89 MG/DL (ref 74–99)
HCT VFR BLD AUTO: 30.5 % (ref 34–46)
HDW: 2.55
HGB BLD-MCNC: 9.6 GM/DL (ref 11.4–16)
LUC NFR BLD AUTO: 3 %
LYMPHOCYTES # SPEC AUTO: 0.8 K/UL (ref 1–4.8)
LYMPHOCYTES NFR SPEC AUTO: 9 %
MCH RBC QN AUTO: 29.9 PG (ref 25–35)
MCHC RBC AUTO-ENTMCNC: 31.6 G/DL (ref 31–37)
MCV RBC AUTO: 94.6 FL (ref 80–100)
MONOCYTES # BLD AUTO: 0.8 K/UL (ref 0–1)
MONOCYTES NFR BLD AUTO: 9 %
NEUTROPHILS # BLD AUTO: 7.3 K/UL (ref 1.3–7.7)
NEUTROPHILS NFR BLD AUTO: 78 %
NON-AFRICAN AMERICAN GFR(MDRD): >60
POTASSIUM SERPL-SCNC: 3.7 MMOL/L (ref 3.5–5.1)
PROT SERPL-MCNC: 4.8 G/DL (ref 6.3–8.2)
SODIUM SERPL-SCNC: 137 MMOL/L (ref 137–145)
WBC # BLD AUTO: 0.28 10*3/UL
WBC # BLD AUTO: 9.4 K/UL (ref 3.8–10.6)
WBC (PEROX): 9.94

## 2017-03-22 RX ADMIN — CEFAZOLIN SCH MLS/HR: 330 INJECTION, POWDER, FOR SOLUTION INTRAMUSCULAR; INTRAVENOUS at 14:25

## 2017-03-22 RX ADMIN — CEFAZOLIN SCH MLS/HR: 330 INJECTION, POWDER, FOR SOLUTION INTRAMUSCULAR; INTRAVENOUS at 19:00

## 2017-03-22 RX ADMIN — HYDROCODONE BITARTRATE AND ACETAMINOPHEN PRN EACH: 5; 325 TABLET ORAL at 22:40

## 2017-03-22 RX ADMIN — Medication SCH UNIT: at 07:52

## 2017-03-22 RX ADMIN — DEXTROSE MONOHYDRATE SCH MLS/HR: 5 INJECTION, SOLUTION INTRAVENOUS at 15:47

## 2017-03-22 RX ADMIN — METOPROLOL SUCCINATE SCH MG: 25 TABLET, EXTENDED RELEASE ORAL at 07:52

## 2017-03-22 RX ADMIN — DEXTROSE MONOHYDRATE SCH MLS/HR: 5 INJECTION, SOLUTION INTRAVENOUS at 07:52

## 2017-03-22 RX ADMIN — HYDROCODONE BITARTRATE AND ACETAMINOPHEN PRN EACH: 5; 325 TABLET ORAL at 09:09

## 2017-03-22 RX ADMIN — HYDROCODONE BITARTRATE AND ACETAMINOPHEN PRN EACH: 5; 325 TABLET ORAL at 14:21

## 2017-03-22 RX ADMIN — ESCITALOPRAM OXALATE SCH MG: 10 TABLET, FILM COATED ORAL at 22:19

## 2017-03-22 RX ADMIN — HYDROCODONE BITARTRATE AND ACETAMINOPHEN PRN EACH: 5; 325 TABLET ORAL at 18:26

## 2017-03-22 RX ADMIN — PANTOPRAZOLE SODIUM SCH MG: 40 TABLET, DELAYED RELEASE ORAL at 07:53

## 2017-03-22 RX ADMIN — ENOXAPARIN SODIUM SCH MG: 40 INJECTION SUBCUTANEOUS at 07:52

## 2017-03-22 RX ADMIN — ESCITALOPRAM OXALATE SCH MG: 10 TABLET, FILM COATED ORAL at 21:40

## 2017-03-22 RX ADMIN — PANTOPRAZOLE SODIUM SCH MG: 40 TABLET, DELAYED RELEASE ORAL at 17:07

## 2017-03-22 RX ADMIN — HYDROCODONE BITARTRATE AND ACETAMINOPHEN PRN EACH: 5; 325 TABLET ORAL at 02:10

## 2017-03-22 RX ADMIN — THERA TABS SCH EACH: TAB at 11:01

## 2017-03-22 NOTE — PN
DATE OF SERVICE:  03/21/2017 



REASON FOR FOLLOWUP is UTI and possible secondary peritonitis. 



INTERVAL HISTORY: The patient is afebrile. Her abdominal pain is 

currently controlled. No further vomiting.  Patient denies having any 

chest pain or shortness of breath or cough.  



On examination, blood pressure is 108/57 with a pulse of 78, 

temperature 98.6. She is 96% on room air. General description is an 

elderly female, lying in bed in no distress.  

HEENT EXAMINATION: Pallor. No scleral icterus. Oral mucous membrane is 

dry.  

NECK: Trachea central. No thyromegaly. 

LUNGS:  Unlabored breathing.  Clear to auscultation anteriorly. 

HEART: S1, S2. Regular rate and rhythm.   

ABDOMEN: Soft and minimally tender. No guarding or rigidity. 



LABS: Hemoglobin is 10.1, white count 10.1. BUN of 17, creatinine 

0.63. Urine culture so far negative. Blood culture was negative.  



DIAGNOSTIC IMPRESSION AND PLAN: Patient admitted to the hospital with 

sepsis that is most likely urinary tract infection plus a component of 

possible secondary peritonitis from possible anastomosis leak with 

 confirming suspicious for the same. The patient is in the process 

of being transferred to [place] where the initial surgery took place. 

Patient will be maintained on Zosyn to which her white count 

responded. Continue supportive care.  



MTDD

## 2017-03-22 NOTE — PN
DATE OF SERVICE: 03/22/2017



REASON FOR FOLLOWUP: Possible secondary peritonitis. 



INTERVAL HISTORY: The patient is afebrile. She is feeling better. Her 

abdominal pain is currently controlled. The patient denies having any 

nausea. No vomiting. Denies having any chest pain or shortness of 

breath or cough. On examination, blood pressure is 117/63 with a pulse 

of 85, temperature of 98.6. She is 96% on room air. General 

description is an elderly female lying in bed in no distress.  

RESPIRATORY SYSTEM: Unlabored breathing. Clear to auscultation 

anteriorly.  

HEART: S1, S2. Regular rate and rhythm. 

ABDOMEN: Soft, slightly distended. No guarding. No rigidity. 

EXTREMITIES: No edema of feet. 



LABS: Hemoglobin 9.6, white count 9.4 with a BUN of 11, creatinine 

0.65. Stool for C difficile is negative. Blood culture has been 

negative.  



DIAGNOSTIC IMPRESSION AND PLAN: Patient admitted to hospital with 

sepsis. Source is likely secondary peritonitis from possible leakage 

from the anastomosis in a patient with recent partial colectomy. 

Patient respond to Zosyn. That will be continued. Surgery requested 

the patient to be transferred to [facility], where the initial 

surgery was done; however, the patient is reluctant to go there. Will 

keep the patient on Zosyn at this point until the decision is made 

regarding her  care. Continue supportive care.  



NELSON

## 2017-03-22 NOTE — PN
DATE OF SERVICE: 03/22/2017



This 76-year-old admitted after abdominal surgery, recently also had 

ascites and the patient also had some air in the CAT scan. Dr. Simmons 

is following the patient and possibility of leak was considered; 

however, the patient is improving significantly. White count is 

improved and also I discussed on multiple occasions with the surgical 

team at [facility], which are covering for Dr. Eden, and the 

doctor's number is [phone]. The team recommended to keep the 

patient at [facility] and continue with IV 

antibiotics at this time. As mentioned earlier, cultures are negative 

and delayed CAT scan films were also noted yesterday which showed 

moderate ascitic fluid, unchanged, and stable pneumoperitoneum 

of the left colon was also noted. Sigmoid diverticulosis was also 

noted without any sigmoid diverticulitis. Stable bilateral pulmonary 

pleural effusion was noted. There is no history of any fever, rigors 

or chills.  



PAST MEDICAL HISTORY: Reviewed.



REVIEW OF SYSTEMS:

CARDIOVASCULAR: No angina or palpitations.

RESPIRATORY: No cough.

GI: As mentioned earlier. 

: No dysuria. 

NERVOUS: No numbness or weakness. 



Current medications are reviewed and include: 

1. Tylenol 650 every 6 hours.

2. Norco 5 mg q.4.h. 

3. Xanax 0.5 t.i.d. 

4. Vitamin D3 1000 daily. 

5. Lovenox 40 mg subcu daily.

6. Lexapro 10 mg q.h.s. 

7. Dilaudid 0.5 every 6 hours. 

8. Toprol XL 25 mg p.o. daily.

9. Multivitamin 1 p.o. daily.

10. Narcan.

11. Zofran. 

12. Protonix 40 mg a.c. b.i.d.

13. Zosyn 3.375 IV q.8. 





PHYSICAL EXAM: The patient is alert and oriented x3. Pulse is 90, 

blood pressure is 136/58, respirations 16, temperature 98 degrees, 

pulse ox 96% on room air.  

HEENT: Conjunctivae normal. 

NECK: No jugular venous distension.

CARDIAC: S1 and S2 muffled.

RESPIRATORY: Breath sounds diminished in the bases. No rhonchi. No 

crackles. 

ABDOMEN: Soft. Mild diffuse discomfort. Otherwise, no guarding or 

rigidity. No mass palpable. 

LEGS: No edema. 

NERVOUS SYSTEM: No focal deficits. 



LABS: WBC 9.5, hemoglobin is 9.6. Sodium 137, potassium 3.7. Albumin 

is 2.3.  



ASSESSMENT:

1. Ascites as well as abdominal cavity with air. Rule out bowel leak. 

Undetermined etiology.  

2. Possible urinary tract infection with sepsis, present on admission. 

3. Woman who of undetermined etiology. 

4. History of recent surgery for ovarian cancer. 

5. History of mild acute renal failure, possible prerenal failure, 

present on admission.  

6. Increased WBC, possibly secondary to sepsis, present on admission. 

7. Mild hypoalbuminemia. 

8. History of ovarian cancer. 

9. History of congestive heart failure, ejection fraction unknown. 

10. Gastroesophageal reflux disease. 

11. History of essential hypertension. 

12. History of gastric ulcers. 

13. History of degenerative joint disease. 

14. History of ovarian cancer with chemotherapy and surgery. 

15. History of shingles. 

16. History of abdominal hernia repair. 

17. History of anxiety. 

18. FULL CODE.



RECOMMENDATIONS AND DISCUSSION: In this 76-year-old woman who 

presented with multiple medical issues, at this time I recommend to 

continue current medications. As mentioned earlier, I have discussed 

the case with the surgery team at [facility] today also and the 

surgical team recommend the patient to continue the current 

medications, continue with IV fluids, continue the rest of the 

medications. Surgical team apparently reviewed the CAT scans, which 

have been sent to them in a CD and electronically. Otherwise, continue 

to monitor and DVT prophylaxis. Continue with the rest of the 

medications. Prognosis guarded because of multiple complex medical 

issues. Further recommendations to follow. Also discussed with Dr. Quinten Simmons as well. Please refer to Dr. Simmons's notes for further 

information. Once again, the prognosis is guarded. Further 

recommendations to follow.  



MTDD

## 2017-03-22 NOTE — P.PN
Subjective


Principal diagnosis: 





Nausea vomiting abdominal pain





Patient says her pain is improved.  She was able to sit at the site of the 

bedside today.  She remains on a diet.  No nausea or vomiting.  She is 

afebrile.  White blood cell count remains normal.





Objective





- Vital Signs


Vital signs: 


 Vital Signs











Temp  98.6 F   03/22/17 07:00


 


Pulse  85   03/22/17 07:00


 


Resp  18   03/22/17 07:00


 


BP  117/63   03/22/17 07:00


 


Pulse Ox  96   03/22/17 07:00








 Intake & Output











 03/21/17 03/22/17 03/22/17





 18:59 06:59 18:59


 


Intake Total 400 600 210


 


Balance 400 600 210


 


Intake:   


 


   600 


 


    Sodium Chloride 0.9% 1, 400 600 





    000 ml @ 50 mls/hr IV .   





    [facility] Rx[facility][id number]   


 


  Oral   210


 


Other:   


 


  Voiding Method Toilet Toilet 


 


  # Voids 2  


 


  # Bowel Movements 1 2 














- Exam





Abdomen: Soft, mild distention, mild mid abdominal tenderness





- Labs


CBC & Chem 7: 


 03/22/17 06:50





 03/22/17 06:50


Labs: 


 Abnormal Lab Results - Last 24 Hours (Table)











  03/22/17 03/22/17 Range/Units





  06:50 06:50 


 


RBC  3.22 L   (3.80-5.40)  m/uL


 


Hgb  9.6 L   (11.4-16.0)  gm/dL


 


Hct  30.5 L   (34.0-46.0)  %


 


Lymphocytes #  0.8 L   (1.0-4.8)  k/uL


 


Calcium   8.0 L  (8.4-10.2)  mg/dL


 


Total Protein   4.8 L  (6.3-8.2)  g/dL


 


Albumin   2.3 L  (3.5-5.0)  g/dL














Assessment and Plan


(1) Abdominal pain


Narrative/Plan: 


Continue antibiotics.  Await transfer to tertiary care center where her initial 

surgery was performed.


Status: Acute

## 2017-03-23 VITALS — SYSTOLIC BLOOD PRESSURE: 124 MMHG | HEART RATE: 81 BPM | RESPIRATION RATE: 16 BRPM | DIASTOLIC BLOOD PRESSURE: 68 MMHG

## 2017-03-23 VITALS — TEMPERATURE: 97.1 F

## 2017-03-23 LAB
ALP SERPL-CCNC: 68 U/L (ref 38–126)
ALT SERPL-CCNC: 37 U/L (ref 9–52)
ANION GAP SERPL CALC-SCNC: 12 MMOL/L
AST SERPL-CCNC: 36 U/L (ref 14–36)
BASOPHILS # BLD AUTO: 0 K/UL (ref 0–0.2)
BASOPHILS NFR BLD AUTO: 0 %
BUN SERPL-SCNC: 9 MG/DL (ref 7–17)
CALCIUM SPEC-MCNC: 8.1 MG/DL (ref 8.4–10.2)
CH: 30.1
CHCM: 32.5
CHLORIDE SERPL-SCNC: 102 MMOL/L (ref 98–107)
CO2 SERPL-SCNC: 22 MMOL/L (ref 22–30)
EOSINOPHIL # BLD AUTO: 0.1 K/UL (ref 0–0.7)
EOSINOPHIL NFR BLD AUTO: 1 %
ERYTHROCYTE [DISTWIDTH] IN BLOOD BY AUTOMATED COUNT: 3.59 M/UL (ref 3.8–5.4)
ERYTHROCYTE [DISTWIDTH] IN BLOOD: 12.3 % (ref 11.5–15.5)
GLUCOSE SERPL-MCNC: 110 MG/DL (ref 74–99)
HCT VFR BLD AUTO: 33.3 % (ref 34–46)
HDW: 2.62
HGB BLD-MCNC: 10.9 GM/DL (ref 11.4–16)
LUC NFR BLD AUTO: 3 %
LYMPHOCYTES # SPEC AUTO: 1.7 K/UL (ref 1–4.8)
LYMPHOCYTES NFR SPEC AUTO: 13 %
MCH RBC QN AUTO: 30.3 PG (ref 25–35)
MCHC RBC AUTO-ENTMCNC: 32.6 G/DL (ref 31–37)
MCV RBC AUTO: 92.9 FL (ref 80–100)
MONOCYTES # BLD AUTO: 1.1 K/UL (ref 0–1)
MONOCYTES NFR BLD AUTO: 8 %
NEUTROPHILS # BLD AUTO: 9.2 K/UL (ref 1.3–7.7)
NEUTROPHILS NFR BLD AUTO: 74 %
NON-AFRICAN AMERICAN GFR(MDRD): >60
POTASSIUM SERPL-SCNC: 4 MMOL/L (ref 3.5–5.1)
PROT SERPL-MCNC: 5.5 G/DL (ref 6.3–8.2)
SODIUM SERPL-SCNC: 136 MMOL/L (ref 137–145)
WBC # BLD AUTO: 0.39 10*3/UL
WBC # BLD AUTO: 12.4 K/UL (ref 3.8–10.6)
WBC (PEROX): 13.13

## 2017-03-23 RX ADMIN — HYDROCODONE BITARTRATE AND ACETAMINOPHEN PRN EACH: 5; 325 TABLET ORAL at 15:58

## 2017-03-23 RX ADMIN — METOPROLOL SUCCINATE SCH MG: 25 TABLET, EXTENDED RELEASE ORAL at 08:32

## 2017-03-23 RX ADMIN — HYDROCODONE BITARTRATE AND ACETAMINOPHEN PRN EACH: 5; 325 TABLET ORAL at 11:30

## 2017-03-23 RX ADMIN — PANTOPRAZOLE SODIUM SCH MG: 40 TABLET, DELAYED RELEASE ORAL at 16:51

## 2017-03-23 RX ADMIN — HYDROCODONE BITARTRATE AND ACETAMINOPHEN PRN EACH: 5; 325 TABLET ORAL at 04:41

## 2017-03-23 RX ADMIN — PANTOPRAZOLE SODIUM SCH MG: 40 TABLET, DELAYED RELEASE ORAL at 08:31

## 2017-03-23 RX ADMIN — DEXTROSE MONOHYDRATE SCH MLS/HR: 5 INJECTION, SOLUTION INTRAVENOUS at 08:31

## 2017-03-23 RX ADMIN — Medication SCH UNIT: at 08:31

## 2017-03-23 RX ADMIN — ENOXAPARIN SODIUM SCH MG: 40 INJECTION SUBCUTANEOUS at 08:31

## 2017-03-23 RX ADMIN — DEXTROSE MONOHYDRATE SCH MLS/HR: 5 INJECTION, SOLUTION INTRAVENOUS at 00:41

## 2017-03-23 RX ADMIN — THERA TABS SCH EACH: TAB at 11:30

## 2017-03-23 NOTE — P.PN
Subjective


Principal diagnosis: 





Abd pain post op, vomiting





Pt seen this AM in follow up, she states feeling good, she ate all three meals 

yesterday and some breakfast this AM, no nausea or vomiting, her abd is still 

tender in the epigastric area.  She has been ambulating to the bathroom, small 

BM this AM.    





Objective





- Vital Signs


Vital signs: 


 Vital Signs











Temp  97.1 F L  03/23/17 07:00


 


Pulse  81   03/23/17 15:38


 


Resp  16   03/23/17 15:38


 


BP  124/68   03/23/17 15:38


 


Pulse Ox  95   03/23/17 15:38








 Intake & Output











 03/23/17 03/23/17 03/24/17





 06:59 18:59 06:59


 


Intake Total 1040  


 


Balance 1040  


 


Weight 68.5 kg  


 


Intake:   


 


    


 


    Piperacillin-Tazobactam 3 50  





    .375 gm In Dextrose/Water   





    1 50ml.bag @ 12.5 mls/hr   





    [facility] Q8HR [facility] Rx#:   





    [id number]   


 


    Sodium Chloride 0.9% 1, 400  





    000 ml @ 50 mls/hr IV .   





    Q20H [facility] Rx#:[id number]   


 


  Oral 590  


 


Other:   


 


  Voiding Method Toilet Toilet 


 


  # Voids 1 2 


 


  # Bowel Movements  2 














- Constitutional


General appearance: Present: average body habitus, cooperative, no acute 

distress





- Respiratory


Details: 





respirations even and unlabored 





- Integumentary


Integumentary: Present: pale





- Neurologic


Neurologic: Present: CNII-XII intact





- Psychiatric


Psychiatric: Present: A&O x's 3, appropriate affect, intact judgment & insight





- Labs


CBC & Chem 7: 


 03/23/17 07:34





 03/23/17 07:34


Labs: 


 Abnormal Lab Results - Last 24 Hours (Table)











  03/23/17 03/23/17 Range/Units





  07:34 07:34 


 


WBC  12.4 H   (3.8-10.6)  k/uL


 


RBC  3.59 L   (3.80-5.40)  m/uL


 


Hgb  10.9 L   (11.4-16.0)  gm/dL


 


Hct  33.3 L   (34.0-46.0)  %


 


Neutrophils #  9.2 H   (1.3-7.7)  k/uL


 


Monocytes #  1.1 H   (0-1.0)  k/uL


 


Sodium   136 L  (137-145)  mmol/L


 


Glucose   110 H  (74-99)  mg/dL


 


Calcium   8.1 L  (8.4-10.2)  mg/dL


 


Total Protein   5.5 L  (6.3-8.2)  g/dL


 


Albumin   2.7 L  (3.5-5.0)  g/dL








 Microbiology - Last 24 Hours (Table)











 03/20/17 16:30 Stool Culture - Final





 Stool 














Assessment and Plan


(1) Ovarian cancer


Narrative/Plan: 


Dr. Vale will f/u with pt in a few weeks in the outpatient setting.  


Status: Acute   


Plan: 





Pt later in the afternoon was not feeling well, abd discomfort increased and 

she was in general uncomfortable.  Dr. Simmons followed up with the patient and 

recommended transfer to patient's Gyn Surgeon, Gyn Surgeon agreed.

## 2017-03-23 NOTE — P.PN
Subjective


Principal diagnosis: 





Nausea vomiting abdominal pain





Patient states her pain is about the same today.  She felt chills and sweats 

earlier today although she was afebrile.  Her white blood cell count did rise 

somewhat 12.4 today.  Patient have a small bowel movement today.  She is 

tolerating small amounts of her diet.  She states that she is frustrated that 

she is not getting better sooner.





Objective





- Vital Signs


Vital signs: 


 Vital Signs











Temp  97.1 F L  03/23/17 07:00


 


Pulse  92   03/23/17 07:00


 


Resp  18   03/23/17 07:00


 


BP  140/77   03/23/17 07:00


 


Pulse Ox  94 L  03/23/17 07:00








 Intake & Output











 03/22/17 03/23/17 03/23/17





 18:59 06:59 18:59


 


Intake Total 210 1040 


 


Balance 210 1040 


 


Weight  68.5 kg 


 


Intake:   


 


  IV  450 


 


    Piperacillin-Tazobactam 3  50 





    .375 gm In Dextrose/Water   





    1 50ml.bag @ 12.5 mls/hr   





    IVPB Q8HR ANUPAM Rx#:   





    [id number]   


 


    Sodium Chloride 0.9% 1,  400 





    000 ml @ 50 mls/hr IV .   





    Q20H ANUPAM Rx#:[id number]   


 


  Oral 210 590 


 


Other:   


 


  Voiding Method  Toilet Toilet


 


  # Voids 1 1 2


 


  # Bowel Movements   2














- Exam





Abdomen: Soft, mild epigastric tenderness, mild distention, incision clean and 

dry





- Labs


CBC & Chem 7: 


 03/23/17 07:34





 03/23/17 07:34


Labs: 


 Abnormal Lab Results - Last 24 Hours (Table)











  03/23/17 03/23/17 Range/Units





  07:34 07:34 


 


WBC  12.4 H   (3.8-10.6)  k/uL


 


RBC  3.59 L   (3.80-5.40)  m/uL


 


Hgb  10.9 L   (11.4-16.0)  gm/dL


 


Hct  33.3 L   (34.0-46.0)  %


 


Neutrophils #  9.2 H   (1.3-7.7)  k/uL


 


Monocytes #  1.1 H   (0-1.0)  k/uL


 


Sodium   136 L  (137-145)  mmol/L


 


Glucose   110 H  (74-99)  mg/dL


 


Calcium   8.1 L  (8.4-10.2)  mg/dL


 


Total Protein   5.5 L  (6.3-8.2)  g/dL


 


Albumin   2.7 L  (3.5-5.0)  g/dL








 Microbiology - Last 24 Hours (Table)











 03/20/17 16:30 Stool Culture - Preliminary





 Stool 














Assessment and Plan


(1) Abdominal pain


Narrative/Plan: 


Patient with rising white blood cell count.  The patient's daughter was present 

at the bedside today.  The patient's daughter would like her to be transferred 

to [facility] for evaluation at this time.  I spoke with the physician 

covering for Dr. Eden.  They are agreeable to transfer at this time.  

Continue antibiotics and current diet for now.


Status: Acute

## 2017-03-23 NOTE — XR
EXAMINATION TYPE: XR abdomen complete w decub

 

DATE OF EXAM: 3/23/2017 12:04 PM

 

COMPARISON: CT abdomen pelvis 21 March 2017, abdomen film second of June 2015

 

HISTORY: Postbowel resection

 

TECHNIQUE:  Supine, upright, and left side down lateral decubitus views of the abdomen are obtained.

 

FINDINGS:  

 

There is no evidence for pneumoperitoneum.  

 

The bowel gas pattern is unremarkable as there is air throughout nondilated small and large bowel.  S
urgical clips are present. Basilar atelectatic changes and associated pleural effusions again noted, 
air bronchograms left lower lobe.

 

No sizeable air fluid levels. Hiatal hernia possibly present, lucency present behind the heart. The n
onluminal air seen on CT scan from 21st of March 2017 is not seen well on plain film

 

No mass effects are seen.  Degenerative disc changes in the visualized spine

 

No unusual calcifications.  There are dense diverticula in the descending colon, vascular calcificati
ons within the pelvis Degenerative disc changes in the visualized spine.

 

IMPRESSION: 

Left lower lobe greater than right atelectasis versus pneumonia and associated effusion. Diverticulos
is. Additional findings above.

## 2017-03-23 NOTE — PN
DATE OF SERVICE: 03/23/2017



REASON FOR FOLLOWUP: Sepsis with a question of abdominal source. 



INTERVAL HISTORY: The patient is afebrile. Her overall abdominal pain 

has improved. The patient has been taking less of the Norco pills 

 nausea and vomiting to help bowel movement and no diarrhea. On 

examination, blood pressure is 124/68 with a pulse of 81, temperature 

97.1. She is 95% on room air. General description is an elderly female 

lying in bed in no distress.  

RESPIRATORY SYSTEM: Unlabored breathing. Clear to auscultation 

anteriorly.  

HEART: S1, S2. Regular rate and rhythm. 

ABDOMEN: Soft. Slightly distended. Mildly tender. No guarding. No 

rigidity.  



LABS: Hemoglobin is 10.9, white count 12.4 with a BUN of 9, creatinine 

0.62. Blood culture has been negative so far.  



DIAGNOSTIC IMPRESSION AND PLAN: Patient admitted to hospital with 

sepsis with concern for possible abdominal source in a patient with 

recent abdominal surgery with colectomy and end-to-end anastomosis. 

Concern for possible   anastomosis leak. Patient 

responded to Zosyn; however, the patient should be made n.p.o. if 

there is concern for anastomosis leak. This will be discussed further 

with Surgery. Continue Zosyn at this point. Will watch her closely. 

Continue supportive care.  



NELSON

## 2017-03-23 NOTE — DS
DATE OF ADMISSION:   03/19/2017

DATE OF DISCHARGE:   



FINAL DIAGNOSES: 

1. Ascites as well as abdominal cavity air, rule out bowel leak or 

undetermined etiology.  

2. Possible urinary tract infection with sepsis, present on admission. 

3. History of recent surgery for ovarian cancer. 

4. History of mild acute renal failure, possible prerenal failure 

present on admission.  

5. Increased WBC, possibly secondary to sepsis, present on admission. 

6. Mild hypoalbuminemia. 

7. History of ovarian cancer. 

8. History of congestive heart failure, ejection fraction unknown. 

9. History of gastroesophageal reflux disease. 

10. History of essential hypertension. 

11. History of gastric ulcers. 

12. History of degenerative joint disease.

13. History of ovarian cancer with chemotherapy and surgery. 

14. History of shingles. 

15. History of abdominal hernia repair. 

16. History of anxiety. 

17. FULL CODE. 



DISCHARGE DISPOSITION: The patient will be discharged in stable 

condition with guarded prognosis. Patient will be transferred to [facility] in a stable condition with guarded prognosis per 

recommendations by Dr. Simmons. Total time taken 35 minutes.  



HISTORY OF PRESENT ILLNESS: This 76-year-old woman with a past medical 

history of multiple medical problems had recent surgery by Dr. Eden. 

The patient admitted with abdominal discomfort and had features of 

ascites. Abdominal cavity air was noted and Dr. Simmons would like the 

patient to be transferred to rule out bowel leak. The patient's care 

discussed with the on-call surgical fellow in [facility] on multiple 

occasions and the patient will be transferred for further evaluation 

and treatment. Please refer to the previous dictations and staff notes 

for further details.  



On exam, vitals are stable. 

CARDIOVASCULAR SYSTEM: S1, S2. 

RESPIRATORY: Breath sounds diminished.  

ABDOMEN: Soft. Mild diffuse distention. 



Lab-wise the white count is 12.4, hemoglobin is 10.9. 



At this time the patient will be transferred. 



Current medications are as follows: 

1. Tylenol 650 q.6 p.r.n. 

2. Norco 5 mg q.4 p.r.n. 

3. Xanax 0.5 t.i.d.  

4. Vitamin D3 1000 daily. 

5. Lovenox 40 mg subcu daily. 

6. Lexapro 10 mg daily.

7. Dilaudid 0.25 q.6.

8. Toprol XL 25 mg daily. 

9. Multivitamins 1 p.o. daily. 

10. Narcan 0.2 q.2 p.r.n. 

11. Zofran 4 mg q.8 p.r.n. 

12. Protonix 40 mg b.i.d.

13. Zosyn 3.375 q.8. 

14. Restoril 15 mg q.6 p.r.n.

## 2017-04-05 ENCOUNTER — HOSPITAL ENCOUNTER (INPATIENT)
Dept: HOSPITAL 47 - EC | Age: 77
LOS: 5 days | Discharge: HOME | DRG: 291 | End: 2017-04-10
Attending: INTERNAL MEDICINE | Admitting: INTERNAL MEDICINE
Payer: MEDICARE

## 2017-04-05 VITALS — BODY MASS INDEX: 29.5 KG/M2

## 2017-04-05 DIAGNOSIS — I11.0: Primary | ICD-10-CM

## 2017-04-05 DIAGNOSIS — Z82.49: ICD-10-CM

## 2017-04-05 DIAGNOSIS — I27.2: ICD-10-CM

## 2017-04-05 DIAGNOSIS — F41.9: ICD-10-CM

## 2017-04-05 DIAGNOSIS — M19.90: ICD-10-CM

## 2017-04-05 DIAGNOSIS — Z85.038: ICD-10-CM

## 2017-04-05 DIAGNOSIS — Z79.899: ICD-10-CM

## 2017-04-05 DIAGNOSIS — D63.0: ICD-10-CM

## 2017-04-05 DIAGNOSIS — K21.9: ICD-10-CM

## 2017-04-05 DIAGNOSIS — J96.01: ICD-10-CM

## 2017-04-05 DIAGNOSIS — J44.9: ICD-10-CM

## 2017-04-05 DIAGNOSIS — Z90.49: ICD-10-CM

## 2017-04-05 DIAGNOSIS — I35.0: ICD-10-CM

## 2017-04-05 DIAGNOSIS — I35.1: ICD-10-CM

## 2017-04-05 DIAGNOSIS — Z85.43: ICD-10-CM

## 2017-04-05 DIAGNOSIS — E83.42: ICD-10-CM

## 2017-04-05 DIAGNOSIS — Z86.19: ICD-10-CM

## 2017-04-05 DIAGNOSIS — Z90.710: ICD-10-CM

## 2017-04-05 DIAGNOSIS — Z92.21: ICD-10-CM

## 2017-04-05 DIAGNOSIS — I50.33: ICD-10-CM

## 2017-04-05 DIAGNOSIS — E44.0: ICD-10-CM

## 2017-04-05 DIAGNOSIS — M81.0: ICD-10-CM

## 2017-04-05 DIAGNOSIS — Z87.11: ICD-10-CM

## 2017-04-05 LAB
ALP SERPL-CCNC: 88 U/L (ref 38–126)
ALT SERPL-CCNC: 25 U/L (ref 9–52)
ANION GAP SERPL CALC-SCNC: 11 MMOL/L
APTT BLD: 26.1 SEC (ref 22–30)
AST SERPL-CCNC: 23 U/L (ref 14–36)
BASOPHILS # BLD AUTO: 0 K/UL (ref 0–0.2)
BASOPHILS NFR BLD AUTO: 0 %
BUN SERPL-SCNC: 5 MG/DL (ref 7–17)
CALCIUM SPEC-MCNC: 8.5 MG/DL (ref 8.4–10.2)
CH: 29.3
CHCM: 33.3
CHLORIDE SERPL-SCNC: 101 MMOL/L (ref 98–107)
CK SERPL-CCNC: 25 U/L (ref 30–135)
CO2 SERPL-SCNC: 28 MMOL/L (ref 22–30)
EOSINOPHIL # BLD AUTO: 0 K/UL (ref 0–0.7)
EOSINOPHIL NFR BLD AUTO: 0 %
ERYTHROCYTE [DISTWIDTH] IN BLOOD BY AUTOMATED COUNT: 3.49 M/UL (ref 3.8–5.4)
ERYTHROCYTE [DISTWIDTH] IN BLOOD: 13.4 % (ref 11.5–15.5)
GLUCOSE SERPL-MCNC: 100 MG/DL (ref 74–99)
HCT VFR BLD AUTO: 30.8 % (ref 34–46)
HDW: 3.24
HGB BLD-MCNC: 10.3 GM/DL (ref 11.4–16)
INR PPP: 1.2 (ref ?–1.1)
KETONES UR QL STRIP.AUTO: (no result)
LUC NFR BLD AUTO: 2 %
LYMPHOCYTES # SPEC AUTO: 1.9 K/UL (ref 1–4.8)
LYMPHOCYTES NFR SPEC AUTO: 19 %
MAGNESIUM SPEC-SCNC: 1.4 MG/DL (ref 1.6–2.3)
MCH RBC QN AUTO: 29.4 PG (ref 25–35)
MCHC RBC AUTO-ENTMCNC: 33.3 G/DL (ref 31–37)
MCV RBC AUTO: 88.2 FL (ref 80–100)
MONOCYTES # BLD AUTO: 0.6 K/UL (ref 0–1)
MONOCYTES NFR BLD AUTO: 6 %
NEUTROPHILS # BLD AUTO: 7.6 K/UL (ref 1.3–7.7)
NEUTROPHILS NFR BLD AUTO: 73 %
NON-AFRICAN AMERICAN GFR(MDRD): >60
PH UR: 7 [PH] (ref 5–8)
POTASSIUM SERPL-SCNC: 4 MMOL/L (ref 3.5–5.1)
PROT SERPL-MCNC: 6.1 G/DL (ref 6.3–8.2)
PT BLD: 11.8 SEC (ref 9–12)
SODIUM SERPL-SCNC: 140 MMOL/L (ref 137–145)
SP GR UR: 1 (ref 1–1.03)
TROPONIN I SERPL-MCNC: 0.02 NG/ML (ref 0–0.03)
UA BILLING (MACRO VS. MICRO): (no result)
UROBILINOGEN UR QL STRIP: <2 MG/DL (ref ?–2)
WBC # BLD AUTO: 0.24 10*3/UL
WBC # BLD AUTO: 10.4 K/UL (ref 3.8–10.6)
WBC (PEROX): 10.95

## 2017-04-05 PROCEDURE — 84484 ASSAY OF TROPONIN QUANT: CPT

## 2017-04-05 PROCEDURE — 94760 N-INVAS EAR/PLS OXIMETRY 1: CPT

## 2017-04-05 PROCEDURE — 84443 ASSAY THYROID STIM HORMONE: CPT

## 2017-04-05 PROCEDURE — 81003 URINALYSIS AUTO W/O SCOPE: CPT

## 2017-04-05 PROCEDURE — 93306 TTE W/DOPPLER COMPLETE: CPT

## 2017-04-05 PROCEDURE — 96375 TX/PRO/DX INJ NEW DRUG ADDON: CPT

## 2017-04-05 PROCEDURE — 82553 CREATINE MB FRACTION: CPT

## 2017-04-05 PROCEDURE — 83735 ASSAY OF MAGNESIUM: CPT

## 2017-04-05 PROCEDURE — 83880 ASSAY OF NATRIURETIC PEPTIDE: CPT

## 2017-04-05 PROCEDURE — 96365 THER/PROPH/DIAG IV INF INIT: CPT

## 2017-04-05 PROCEDURE — 85025 COMPLETE CBC W/AUTO DIFF WBC: CPT

## 2017-04-05 PROCEDURE — 71010: CPT

## 2017-04-05 PROCEDURE — 85730 THROMBOPLASTIN TIME PARTIAL: CPT

## 2017-04-05 PROCEDURE — 80053 COMPREHEN METABOLIC PANEL: CPT

## 2017-04-05 PROCEDURE — 80048 BASIC METABOLIC PNL TOTAL CA: CPT

## 2017-04-05 PROCEDURE — 96376 TX/PRO/DX INJ SAME DRUG ADON: CPT

## 2017-04-05 PROCEDURE — 36415 COLL VENOUS BLD VENIPUNCTURE: CPT

## 2017-04-05 PROCEDURE — 85610 PROTHROMBIN TIME: CPT

## 2017-04-05 PROCEDURE — 99291 CRITICAL CARE FIRST HOUR: CPT

## 2017-04-05 PROCEDURE — 82550 ASSAY OF CK (CPK): CPT

## 2017-04-05 PROCEDURE — 80061 LIPID PANEL: CPT

## 2017-04-05 PROCEDURE — 93005 ELECTROCARDIOGRAM TRACING: CPT

## 2017-04-05 PROCEDURE — 71020: CPT

## 2017-04-05 RX ADMIN — FUROSEMIDE SCH MG: 10 INJECTION, SOLUTION INTRAMUSCULAR; INTRAVENOUS at 23:45

## 2017-04-05 RX ADMIN — NYSTATIN SCH UNIT: 100000 SUSPENSION ORAL at 23:45

## 2017-04-05 RX ADMIN — AMOXICILLIN AND CLAVULANATE POTASSIUM SCH EACH: 875; 125 TABLET, FILM COATED ORAL at 21:14

## 2017-04-05 RX ADMIN — ESCITALOPRAM OXALATE SCH MG: 10 TABLET, FILM COATED ORAL at 21:14

## 2017-04-05 RX ADMIN — HYDROCODONE BITARTRATE AND ACETAMINOPHEN PRN EACH: 10; 325 TABLET ORAL at 21:14

## 2017-04-05 RX ADMIN — HYDROCODONE BITARTRATE AND ACETAMINOPHEN PRN EACH: 10; 325 TABLET ORAL at 16:37

## 2017-04-05 NOTE — XR
EXAMINATION TYPE: XR chest 2V

 

DATE OF EXAM: 4/5/2017 11:43 AM

 

COMPARISON: 3/19/2017

 

TECHNIQUE: PA and lateral views submitted.

 

HISTORY: Difficulty breathing

 

FINDINGS:

Heart is enlarged. Left lower lobe infiltrate and small bilateral pleural effusions greater on the le
ft. Interstitium prominent.

 

Arthropathy of the shoulders and diffuse osteopenia noted. Hypertrophic and degenerative change of th
e spine.

 

IMPRESSION:

1. Correlate for mild CHF with left lower lobe infiltrate and small bilateral effusion

## 2017-04-05 NOTE — ED
SOB HPI





- General


Chief Complaint: Shortness of Breath


Stated Complaint: SOB


Time Seen by Provider: 17 11:15


Source: patient, EMS, RN notes reviewed


Mode of arrival: EMS


Limitations: no limitations





- History of Present Illness


Initial Comments: 





This is a 76-year-old female history of CHF in the past who just had 

colonoscopy with polypectomy done at [facility] earlier this week who 

presents with acid of shortness of breath this started last evening and got 

progressively worse.  She denies any fevers or chills she states shows has 

sweats no chest pain she does complain some peripheral edema.  She has no other 

complaints at this time she denies any palpitations.  She is brought in by EMS.

  She states she gets some relief in the DuoNeb was given.  She has no history 

of COPD or emphysema but did live with smokers groin .  She herself is a 

nonsmoker


MD Complaint: shortness of breath





- Related Data


 Home Medications











 Medication  Instructions  Recorded  Confirmed


 


Escitalopram [Lexapro] 10 mg PO HS 05/05/15 04/05/17


 


Cholecalciferol [Vitamin D3] 2,000 unit PO DAILY 16


 


ALPRAZolam [Xanax] 0.25 mg PO BID PRN 17


 


Enoxaparin [Lovenox] 40 mg SQ DAILY 17


 


Metoprolol Succinate [Toprol XL] 25 mg PO DAILY 17


 


Multivits-Min/Iron/FA/Lutein 1 tab PO DAILY 17





[Centrum Silver Women Tablet]   


 


Amoxicillin/Potassium Clav 1 tab PO Q12HR 17





[Augmentin 875-125 Tablet]   


 


Ascorbic Acid [Vitamin C] 500 mg PO DAILY 17


 


Calcium Carbonate [Calcium] 1,200 mg PO DAILY 17


 


Fluconazole [Diflucan] 200 mg PO DAILY 17


 


HYDROcodone/APAP 10-325MG [Norco 1 tab PO Q4HR PRN 17





]   











 Allergies











Allergy/AdvReac Type Severity Reaction Status Date / Time


 


No Known Allergies Allergy   Verified 17 12:01














Review of Systems


ROS Statement: 


Those systems with pertinent positive or pertinent negative responses have been 

documented in the HPI.





ROS Other: All systems not noted in ROS Statement are negative.





Past Medical History


Past Medical History: Cancer, Heart Failure, GERD/Reflux, Hypertension


Additional Past Medical History / Comment(s): Other HX:  gastric ulcer years ago

, osteoporosis, heart murmur, athritis bilateral hands  /     2015  [facility]IAN 

CANCER WITH CHEMO AND ELVIA/BSO/shingles


History of Any Multi-Drug Resistant Organisms: None Reported


Past Surgical History: Hernia Repair, Hysterectomy


Additional Past Surgical History / Comment(s): Abdominal hernia repair 40 yrs 

ago.    ELVIA/BSO, colon resection 2 weeks ago


Past Anesthesia/Blood Transfusion Reactions: No Reported Reaction


Additional Past Anesthesia/Blood Transfusion Reaction / Comment(s): Pt has 

never recieved blood.


Past Psychological History: Anxiety


Additional Psychological History / Comment(s): Pt lives alone.  She has a very 

supportive family and good friends and neighbors.  She is normally active and 

still works at [employer].


Smoking Status: Never smoker


Past Alcohol Use History: None Reported


Past Drug Use History: None Reported





- Past Family History


  ** Father


Family Medical History: Cancer, Congestive Heart Failure (CHF)


Additional Family Medical History / Comment(s): Father had rheumatic fever as a 

child.  He  in his 60s.





  ** Mother


Family Medical History: Congestive Heart Failure (CHF)





General Exam





- General Exam Comments


Initial Comments: 





This is a well-developed well-nourished awake alert oriented 3 female


Limitations: no limitations


General appearance: alert, anxious, in distress


Head exam: Present: atraumatic, normocephalic, normal inspection


Eye exam: Present: normal appearance, PERRL, EOMI.  Absent: scleral icterus, 

conjunctival injection, periorbital swelling


ENT exam: Present: normal exam, mucous membranes moist


Neck exam: Present: normal inspection.  Absent: tenderness, meningismus, 

lymphadenopathy


Respiratory exam: Present: wheezes, accessory muscle use, decreased breath 

sounds.  Absent: respiratory distress, rales, rhonchi, stridor


Cardiovascular Exam: Present: regular rate, normal rhythm, normal heart sounds.

  Absent: systolic murmur, diastolic murmur, rubs, gallop, clicks


GI/Abdominal exam: Present: soft, normal bowel sounds.  Absent: distended, 

tenderness, guarding, rebound, rigid


Extremities exam: Present: normal inspection, full ROM, normal capillary refill

, pedal edema.  Absent: tenderness, joint swelling, calf tenderness


Back exam: Present: normal inspection


Neurological exam: Present: alert, oriented X3, CN II-XII intact


Psychiatric exam: Present: normal affect, normal mood


Skin exam: Present: warm, dry, intact, normal color.  Absent: rash





Course


 Vital Signs











  17





  11:07 11:15 11:45


 


Temperature 98.1 F  


 


Pulse Rate 80  80


 


Respiratory 27 H 27 H 26 H





Rate   


 


Blood Pressure 138/70  145/73


 


O2 Sat by Pulse 99  92 L





Oximetry   














  17





  12:12 12:45 13:42


 


Temperature   


 


Pulse Rate 86 83 88


 


Respiratory 26 H 26 H 28 H





Rate   


 


Blood Pressure 127/62 129/87 156/71


 


O2 Sat by Pulse 92 L 93 L 90 L





Oximetry   














  17





  15:25


 


Temperature 


 


Pulse Rate 73


 


Respiratory 20





Rate 


 


Blood Pressure 154/70


 


O2 Sat by Pulse 92 L





Oximetry 














- Reevaluation(s)


Reevaluation #1: 





17 15:55


I did reevaluate the patient did discuss findings the patient family members.





Medical Decision Making





- Medical Decision Making





I did discuss findings with patient family patient does demonstrate evidence of 

CHF lower lobe infiltrate small pleural effusions hypomagnesemia patient will 

be admitted





- Lab Data


Result diagrams: 


 17 11:30





 17 11:30


 Lab Results











  17 Range/Units





  11:30 11:30 11:30 


 


WBC   10.4   (3.8-10.6)  k/uL


 


RBC   3.49 L   (3.80-5.40)  m/uL


 


Hgb   10.3 L   (11.4-16.0)  gm/dL


 


Hct   30.8 L   (34.0-46.0)  %


 


MCV   88.2   (80.0-100.0)  fL


 


MCH   29.4   (25.0-35.0)  pg


 


MCHC   33.3   (31.0-37.0)  g/dL


 


RDW   13.4   (11.5-15.5)  %


 


Plt Count   266   (150-450)  k/uL


 


Neutrophils %   73   %


 


Lymphocytes %   19   %


 


Monocytes %   6   %


 


Eosinophils %   0   %


 


Basophils %   0   %


 


Neutrophils #   7.6   (1.3-7.7)  k/uL


 


Lymphocytes #   1.9   (1.0-4.8)  k/uL


 


Monocytes #   0.6   (0-1.0)  k/uL


 


Eosinophils #   0.0   (0-0.7)  k/uL


 


Basophils #   0.0   (0-0.2)  k/uL


 


Hypochromasia   Slight   


 


PT     (9.0-12.0)  sec


 


INR     (<1.1)  


 


APTT     (22.0-30.0)  sec


 


Sodium    140  (137-145)  mmol/L


 


Potassium    4.0  (3.5-5.1)  mmol/L


 


Chloride    101  ()  mmol/L


 


Carbon Dioxide    28  (22-30)  mmol/L


 


Anion Gap    11  mmol/L


 


BUN    5 L  (7-17)  mg/dL


 


Creatinine    0.53  (0.52-1.04)  mg/dL


 


Est GFR (MDRD) Af Amer    >60  (>60 ml/min/1.73 sqM)  


 


Est GFR (MDRD) Non-Af    >60  (>60 ml/min/1.73 sqM)  


 


Glucose    100 H  (74-99)  mg/dL


 


Calcium    8.5  (8.4-10.2)  mg/dL


 


Magnesium    1.4 L  (1.6-2.3)  mg/dL


 


Total Bilirubin    0.5  (0.2-1.3)  mg/dL


 


AST    23  (14-36)  U/L


 


ALT    25  (9-52)  U/L


 


Alkaline Phosphatase    88  ()  U/L


 


Total Creatine Kinase  25 L    ()  U/L


 


CK-MB (CK-2)  0.5    (0.0-2.4)  ng/mL


 


CK-MB (CK-2) Rel Index  2.0    


 


Troponin I  0.023    (0.000-0.034)  ng/mL


 


NT-Pro-B Natriuret Pep     pg/mL


 


Total Protein    6.1 L  (6.3-8.2)  g/dL


 


Albumin    2.7 L  (3.5-5.0)  g/dL


 


Urine Color     


 


Urine Appearance     (Clear)  


 


Urine pH     (5.0-8.0)  


 


Ur Specific Gravity     (1.001-1.035)  


 


Urine Protein     (Negative)  


 


Urine Glucose (UA)     (Negative)  


 


Urine Ketones     (Negative)  


 


Urine Blood     (Negative)  


 


Urine Nitrite     (Negative)  


 


Urine Bilirubin     (Negative)  


 


Urine Urobilinogen     (<2.0)  mg/dL


 


Ur Leukocyte Esterase     (Negative)  














  17 Range/Units





  11:30 11:30 11:30 


 


WBC     (3.8-10.6)  k/uL


 


RBC     (3.80-5.40)  m/uL


 


Hgb     (11.4-16.0)  gm/dL


 


Hct     (34.0-46.0)  %


 


MCV     (80.0-100.0)  fL


 


MCH     (25.0-35.0)  pg


 


MCHC     (31.0-37.0)  g/dL


 


RDW     (11.5-15.5)  %


 


Plt Count     (150-450)  k/uL


 


Neutrophils %     %


 


Lymphocytes %     %


 


Monocytes %     %


 


Eosinophils %     %


 


Basophils %     %


 


Neutrophils #     (1.3-7.7)  k/uL


 


Lymphocytes #     (1.0-4.8)  k/uL


 


Monocytes #     (0-1.0)  k/uL


 


Eosinophils #     (0-0.7)  k/uL


 


Basophils #     (0-0.2)  k/uL


 


Hypochromasia     


 


PT   11.8   (9.0-12.0)  sec


 


INR   1.2   (<1.1)  


 


APTT   26.1   (22.0-30.0)  sec


 


Sodium     (137-145)  mmol/L


 


Potassium     (3.5-5.1)  mmol/L


 


Chloride     ()  mmol/L


 


Carbon Dioxide     (22-30)  mmol/L


 


Anion Gap     mmol/L


 


BUN     (7-17)  mg/dL


 


Creatinine     (0.52-1.04)  mg/dL


 


Est GFR (MDRD) Af Amer     (>60 ml/min/1.73 sqM)  


 


Est GFR (MDRD) Non-Af     (>60 ml/min/1.73 sqM)  


 


Glucose     (74-99)  mg/dL


 


Calcium     (8.4-10.2)  mg/dL


 


Magnesium     (1.6-2.3)  mg/dL


 


Total Bilirubin     (0.2-1.3)  mg/dL


 


AST     (14-36)  U/L


 


ALT     (9-52)  U/L


 


Alkaline Phosphatase     ()  U/L


 


Total Creatine Kinase     ()  U/L


 


CK-MB (CK-2)     (0.0-2.4)  ng/mL


 


CK-MB (CK-2) Rel Index     


 


Troponin I     (0.000-0.034)  ng/mL


 


NT-Pro-B Natriuret Pep  2090    pg/mL


 


Total Protein     (6.3-8.2)  g/dL


 


Albumin     (3.5-5.0)  g/dL


 


Urine Color    Light Yellow  


 


Urine Appearance    Clear  (Clear)  


 


Urine pH    7.0  (5.0-8.0)  


 


Ur Specific Gravity    1.003  (1.001-1.035)  


 


Urine Protein    Negative  (Negative)  


 


Urine Glucose (UA)    Negative  (Negative)  


 


Urine Ketones    1+ H  (Negative)  


 


Urine Blood    Negative  (Negative)  


 


Urine Nitrite    Negative  (Negative)  


 


Urine Bilirubin    Negative  (Negative)  


 


Urine Urobilinogen    <2.0  (<2.0)  mg/dL


 


Ur Leukocyte Esterase    Negative  (Negative)  














- Radiology Data


Radiology results: report reviewed (I did review the x-rays report is evidence 

of congestive failure with a left lower lobe infiltrate.), image reviewed





Critical Care Time


Critical Care Time: Yes


Critical Care Time: 





31 minutes of critical care time which includes initial presentation with 

history physical lab and x-rays evaluation patient response to therapy 

evaluation of the labs and x-rays discussed with the admitting physician 

admission orders and documentation of the above.





Disposition


Clinical Impression: 


 Congestive heart failure, Pneumonia, Hypomagnesemia, Bronchospasm





Disposition: ADMITTED AS IP TO THIS [facility]


Condition: Stable

## 2017-04-06 LAB
ANION GAP SERPL CALC-SCNC: 12 MMOL/L
BASOPHILS # BLD AUTO: 0 K/UL (ref 0–0.2)
BASOPHILS NFR BLD AUTO: 0 %
BUN SERPL-SCNC: 12 MG/DL (ref 7–17)
CALCIUM SPEC-MCNC: 8.2 MG/DL (ref 8.4–10.2)
CH: 28.7
CHCM: 32.3
CHLORIDE SERPL-SCNC: 96 MMOL/L (ref 98–107)
CHOLEST SERPL-MCNC: 149 MG/DL (ref ?–200)
CO2 SERPL-SCNC: 32 MMOL/L (ref 22–30)
EOSINOPHIL # BLD AUTO: 0 K/UL (ref 0–0.7)
EOSINOPHIL NFR BLD AUTO: 0 %
ERYTHROCYTE [DISTWIDTH] IN BLOOD BY AUTOMATED COUNT: 3.67 M/UL (ref 3.8–5.4)
ERYTHROCYTE [DISTWIDTH] IN BLOOD: 13 % (ref 11.5–15.5)
GLUCOSE SERPL-MCNC: 174 MG/DL (ref 74–99)
HCT VFR BLD AUTO: 32.7 % (ref 34–46)
HDLC SERPL-MCNC: 31 MG/DL (ref 40–60)
HDW: 3.16
HGB BLD-MCNC: 11 GM/DL (ref 11.4–16)
LUC NFR BLD AUTO: 2 %
LYMPHOCYTES # SPEC AUTO: 0.9 K/UL (ref 1–4.8)
LYMPHOCYTES NFR SPEC AUTO: 16 %
MAGNESIUM SPEC-SCNC: 1.4 MG/DL (ref 1.6–2.3)
MCH RBC QN AUTO: 29.9 PG (ref 25–35)
MCHC RBC AUTO-ENTMCNC: 33.5 G/DL (ref 31–37)
MCV RBC AUTO: 89.2 FL (ref 80–100)
MONOCYTES # BLD AUTO: 0.4 K/UL (ref 0–1)
MONOCYTES NFR BLD AUTO: 6 %
NEUTROPHILS # BLD AUTO: 4.3 K/UL (ref 1.3–7.7)
NEUTROPHILS NFR BLD AUTO: 76 %
NON-AFRICAN AMERICAN GFR(MDRD): >60
POTASSIUM SERPL-SCNC: 3.6 MMOL/L (ref 3.5–5.1)
SODIUM SERPL-SCNC: 140 MMOL/L (ref 137–145)
TRIGL SERPL-MCNC: 88 MG/DL (ref ?–150)
WBC # BLD AUTO: 0.11 10*3/UL
WBC # BLD AUTO: 5.6 K/UL (ref 3.8–10.6)
WBC (PEROX): 6.09

## 2017-04-06 RX ADMIN — FUROSEMIDE SCH MG: 10 INJECTION, SOLUTION INTRAMUSCULAR; INTRAVENOUS at 05:25

## 2017-04-06 RX ADMIN — MAGNESIUM SULFATE IN DEXTROSE SCH MLS/HR: 10 INJECTION, SOLUTION INTRAVENOUS at 14:31

## 2017-04-06 RX ADMIN — NYSTATIN SCH UNIT: 100000 SUSPENSION ORAL at 17:19

## 2017-04-06 RX ADMIN — METOPROLOL TARTRATE SCH MG: 25 TABLET, FILM COATED ORAL at 22:24

## 2017-04-06 RX ADMIN — NYSTATIN SCH UNIT: 100000 SUSPENSION ORAL at 08:17

## 2017-04-06 RX ADMIN — NYSTATIN SCH UNIT: 100000 SUSPENSION ORAL at 13:23

## 2017-04-06 RX ADMIN — AMOXICILLIN AND CLAVULANATE POTASSIUM SCH EACH: 875; 125 TABLET, FILM COATED ORAL at 08:18

## 2017-04-06 RX ADMIN — Medication SCH MG: at 11:51

## 2017-04-06 RX ADMIN — FOLIC ACID SCH MG: 1 TABLET ORAL at 11:51

## 2017-04-06 RX ADMIN — ESCITALOPRAM OXALATE SCH MG: 10 TABLET, FILM COATED ORAL at 22:24

## 2017-04-06 RX ADMIN — FLUCONAZOLE SCH MG: 100 TABLET ORAL at 08:18

## 2017-04-06 RX ADMIN — ENOXAPARIN SODIUM SCH MG: 40 INJECTION SUBCUTANEOUS at 08:18

## 2017-04-06 RX ADMIN — POTASSIUM CHLORIDE SCH MEQ: 20 TABLET, EXTENDED RELEASE ORAL at 13:48

## 2017-04-06 RX ADMIN — FUROSEMIDE SCH MG: 10 INJECTION, SOLUTION INTRAMUSCULAR; INTRAVENOUS at 22:25

## 2017-04-06 RX ADMIN — ASPIRIN 325 MG ORAL TABLET SCH MG: 325 PILL ORAL at 09:11

## 2017-04-06 RX ADMIN — CALCIUM CARBONATE (ANTACID) CHEW TAB 500 MG SCH MG: 500 CHEW TAB at 08:18

## 2017-04-06 RX ADMIN — HYDROCODONE BITARTRATE AND ACETAMINOPHEN PRN EACH: 10; 325 TABLET ORAL at 22:26

## 2017-04-06 RX ADMIN — ATORVASTATIN CALCIUM SCH MG: 10 TABLET, FILM COATED ORAL at 22:29

## 2017-04-06 RX ADMIN — AMOXICILLIN AND CLAVULANATE POTASSIUM SCH EACH: 875; 125 TABLET, FILM COATED ORAL at 22:23

## 2017-04-06 RX ADMIN — POTASSIUM CHLORIDE SCH MEQ: 20 TABLET, EXTENDED RELEASE ORAL at 16:28

## 2017-04-06 RX ADMIN — MAGNESIUM SULFATE IN DEXTROSE SCH MLS/HR: 10 INJECTION, SOLUTION INTRAVENOUS at 13:22

## 2017-04-06 RX ADMIN — THERA TABS SCH EACH: TAB at 11:50

## 2017-04-06 RX ADMIN — HYDROCODONE BITARTRATE AND ACETAMINOPHEN PRN EACH: 10; 325 TABLET ORAL at 06:13

## 2017-04-06 RX ADMIN — Medication SCH UNIT: at 08:19

## 2017-04-06 RX ADMIN — NYSTATIN SCH UNIT: 100000 SUSPENSION ORAL at 22:24

## 2017-04-06 NOTE — P.CRDCN
History of Present Illness


Consult date: 17


Requesting physician: Aixa Olivares


Consult reason: congestive heart failure


Chief complaint: Shortness of breath


History of present illness: 


This is a pleasant 76-year-old  female with history of hypertension, 

mild COPD, ovarian cancer, who presents to the hospital with symptoms of 

aggressively worsening shortness of breath with associated peripheral edema.  

According to the patient, she was first diagnosed with ovarian cancer in , 

she underwent a hysterectomy at that time with chemotherapy.  She had a 

subsequent admission in March of this year, requiring her to be transferred 

back to [facility], where she underwent another surgery for polyp removal.  

Again following that she presented to the hospital approximately one week later 

and again was transferred to [facility] at which time she underwent a third 

surgery.  Following discharge home from that he states she's continued to feel 

more and more short of breath, to the point where she could barely breathe.  

Therefore EMS was called yesterday and the patient was brought to the hospital 

for further evaluation.  Blood pressure on EMS arrival 150/70, heart rate in 

the 80s, 98% on room air.  Chest x-ray revealed congestive heart failure with 

small bilateral pleural effusions.  EKG showed normal sinus rhythm with 

occasional PACs.  Laboratory data was reviewed, hemoglobin 11, white blood cell 

count 5.6, platelet count 258.  Potassium 3.6, BUN 12, creatinine 0.6.  Mag 

level I.4.  Troponin 0.0-3, 0.012.  BNP [id number].  TSH normal .  Patient was 

initiated on IV Lasix in the emergency room, she has diuresed well overnight.  

At the time of my examination this morning, she states that her breathing has 

improved, she still feels somewhat short of breath.





Past Medical History


Past Medical History: Cancer, Heart Failure, COPD, GERD/Reflux, Hypertension


Additional Past Medical History / Comment(s): gastric ulcer years ago, 

osteoporosis, heart murmur, athritis bilateral hands,DJD    OVARIAN CANCER 

WITH CHEMO AND ELVIA/BSO,shingles .


History of Any Multi-Drug Resistant Organisms: None Reported


Past Surgical History: Hernia Repair, Hysterectomy


Additional Past Surgical History / Comment(s): Abdominal hernia repair 40 yrs 

ago.    ELVIA/BSO, 3/6/17colon resection  "AND 2 WEEKS AGO 2ND SX TO SEE IF THERE 

WAS A LEAK-FAMILY STATED NO LEAK"


Past Anesthesia/Blood Transfusion Reactions: No Reported Reaction


Additional Past Anesthesia/Blood Transfusion Reaction / Comment(s): Pt has 

never recieved blood.


Past Psychological History: Anxiety


Additional Psychological History / Comment(s): Pt lives alone in a single story 

home that has 1 step to get into.  She has a very supportive family and good 

friends and neighbors.  She is normally active and still works at [employer]. 

since recent sx was set up with [facility]- home care had first visist 

today.. has no hospital equipment.


Smoking Status: Never smoker


Past Alcohol Use History: None Reported


Past Drug Use History: None Reported





- Past Family History


  ** Father


Family Medical History: Cancer, Congestive Heart Failure (CHF)


Additional Family Medical History / Comment(s): Father had rheumatic fever as a 

child.  He  in his 60s.





  ** Mother


Family Medical History: Congestive Heart Failure (CHF)





Medications and Allergies


 Home Medications











 Medication  Instructions  Recorded  Confirmed  Type


 


Escitalopram [Lexapro] 10 mg PO HS 05/05/15 04/05/17 History


 


Cholecalciferol [Vitamin D3] 2,000 unit PO DAILY 16 History


 


ALPRAZolam [Xanax] 0.25 mg PO BID PRN 17 History


 


Enoxaparin [Lovenox] 40 mg SQ DAILY 17 History


 


Metoprolol Succinate [Toprol XL] 25 mg PO DAILY 17 History


 


Multivits-Min/Iron/FA/Lutein 1 tab PO DAILY 17 History





[Centrum Silver Women Tablet]    


 


Amoxicillin/Potassium Clav 1 tab PO Q12HR 17 History





[Augmentin 875-125 Tablet]    


 


Ascorbic Acid [Vitamin C] 500 mg PO DAILY 17 History


 


Calcium Carbonate [Calcium] 1,200 mg PO DAILY 17 History


 


Fluconazole [Diflucan] 200 mg PO DAILY 17 History


 


HYDROcodone/APAP 10-325MG [Norco 1 tab PO Q4HR PRN 17 History





]    











 Allergies











Allergy/AdvReac Type Severity Reaction Status Date / Time


 


No Known Allergies Allergy   Verified 17 12:01














Physical Exam


Vitals: 


 Vital Signs











  Temp Pulse Pulse Resp BP BP Pulse Ox


 


 17 08:00  96.6 F L   73  18   114/58  94 L


 


 17 04:00  97.3 F L   64  18   138/90  92 L


 


 17 00:00  98.1 F   70  16   130/72  91 L


 


 17 20:00  97.2 F L   83  20   138/63  92 L


 


 17 17:42  97.1 F L  82   20  142/81   93 L


 


 17 16:00   88   20  140/65   92 L








 Intake and Output











 17





 22:59 06:59 14:59


 


Intake Total 150  220


 


Output Total  400 250


 


Balance 150 -400 -30


 


Intake:   


 


  Intake, IV Titration 150  





  Amount   


 


    Sodium Chloride 0.9% 1, 150  





    000 ml @ 20 mls/hr IV .   





    Q24H [facility] Rx#:[id number]   


 


  Oral   220


 


Output:   


 


  Urine  400 250


 


Other:   


 


  Weight 63.049 kg 66.4 kg 66.4 kg








 Patient Weight











 17





 06:59


 


Weight 66.4 kg














PHYSICAL EXAMINATION: 





HEENT: Head is atraumatic, normocephalic.  Pupils equal, round.  Neck is 

supple.  There is  elevated jugular venous pressure.





HEART EXAMINATION: Heart S1 and S2 systolic ejection murmur is heard.





CHEST EXAMINATION: Lungs are clear with diminished air entry bilaterally to the 

bases.





ABDOMEN:  Soft, nontender. Bowel sounds are heard. No organomegaly noted.


 


EXTREMITIES: 2+ peripheral pulses with trace to 1+ evidence of peripheral edema 

and no calf tenderness noted.





NEUROLOGIC patient is awake, alert and oriented -3.


 


.


 








Results





 17 05:57





 17 05:57


 Cardiac Enzymes











  17 Range/Units





  05:57 


 


Troponin I  <0.012  (0.000-0.034)  ng/mL








 Lipids











  17 Range/Units





  05:57 


 


Triglycerides  88  (<150)  mg/dL


 


Cholesterol  149  (<200)  mg/dL


 


HDL Cholesterol  31 L  (40-60)  mg/dL








 CBC











  17 Range/Units





  05:57 


 


WBC  5.6  (3.8-10.6)  k/uL


 


RBC  3.67 L  (3.80-5.40)  m/uL


 


Hgb  11.0 L  (11.4-16.0)  gm/dL


 


Hct  32.7 L  (34.0-46.0)  %


 


Plt Count  258  (150-450)  k/uL








 Comprehensive Metabolic Panel











  17 Range/Units





  05:57 


 


Sodium  140  (137-145)  mmol/L


 


Potassium  3.6  (3.5-5.1)  mmol/L


 


Chloride  96 L  ()  mmol/L


 


Carbon Dioxide  32 H  (22-30)  mmol/L


 


BUN  12  (7-17)  mg/dL


 


Creatinine  0.61  (0.52-1.04)  mg/dL


 


Glucose  174 H  (74-99)  mg/dL


 


Calcium  8.2 L  (8.4-10.2)  mg/dL








 Current Medications











Generic Name Dose Route Start Last Admin





  Trade Name Freq  PRN Reason Stop Dose Admin


 


Hydrocodone Bitart/Acetaminophen  1 each  17 16:01  17 06:13





  Norco 10  PO   1 each





  Q4HR PRN   Administration





  Pain   


 


Alprazolam  0.25 mg  17 16:01  





  Xanax  PO   





  BID PRN   





  Anxiety   


 


Amoxicillin/Clavulanate Potassium  1 each  17 21:00  17 08:18





  Augmentin 875-125  PO   1 each





  Q12HR ANUPAM   Administration


 


Aspirin  325 mg  17 09:00  17 09:11





  Aspirin  PO   325 mg





  DAILY ANUPAM   Administration


 


Calcium Carbonate/Glycine  1,000 mg  17 09:00  17 08:18





  Tums  PO   1,000 mg





  DAILY ANUPAM   Administration


 


Cholecalciferol  2,000 unit  17 09:00  17 08:19





  Vitamin D3  PO   2,000 unit





  DAILY ANUPAM   Administration


 


Enoxaparin Sodium  40 mg  17 09:00  17 08:18





  Lovenox  SQ   40 mg





  DAILY ANUPAM   Administration


 


Escitalopram Oxalate  10 mg  17 21:00  17 21:14





  Lexapro  PO   10 mg





  HS ANUPAM   Administration


 


Fluconazole  200 mg  17 09:00  17 08:18





  Diflucan  PO   200 mg





  DAILY ANUPAM   Administration


 


Folic Acid  1 mg  17 12:00  





  Folic Acid  PO   





  DAILY@1200 ANUPAM   


 


Furosemide  40 mg  17 22:00  0406/17 05:25





  Lasix  IV   40 mg





  Q8H ANUPAM   Administration


 


Sodium Chloride  1,000 mls @ 20 mls/hr  17 16:00  17 16:38





  Saline 0.9%  IV   Not Given





  .Q24H ANUPAM   


 


Metoprolol Succinate  25 mg  17 09:00  17 09:11





  Toprol Xl  PO   25 mg





  DAILY ANUPAM   Administration


 


Multivitamins  1 each  17 12:00  





  Theragran  PO   





  DAILY@1200 ANUPAM   


 


Nystatin  500,000 unit  17 22:00  17 08:17





  Mycostatin Oral Susp  PO   500,000 unit





  QID ANUPAM   Administration


 


Thiamine HCl  100 mg  17 12:00  





  Vitamin B-1  PO   





  DAILY@1200 ANUPAM   








 Intake and Output











 17





 22:59 06:59 14:59


 


Intake Total 150  220


 


Output Total  400 250


 


Balance 150 -400 -30


 


Intake:   


 


  Intake, IV Titration 150  





  Amount   


 


    Sodium Chloride 0.9% 1, 150  





    000 ml @ 20 mls/hr IV .   





    Q24H ANUPAM Rx#:[id number]   


 


  Oral   220


 


Output:   


 


  Urine  400 250


 


Other:   


 


  Weight 63.049 kg 66.4 kg 66.4 kg








 Patient Weight











 17





 06:59


 


Weight 66.4 kg








 





 17 05:57 





 17 05:57 











EKG Interpretations (text)





EKG shows normal sinus rhythm with occasional PAC.





Assessment and Plan


Plan: 


Assessment and plan


#1 congestive heart failure, LV function unknown at this time.  Patient 

currently on IV Lasix.


#2 history of ovarian cancer with prior chemotherapy, patient again will 

require chemotherapy in the next few weeks.


#3 mild hypertension history


#4 COPD history


#5 hypomagnesemia








Plan


We will obtain an echocardiogram with Doppler study.  Continue current dose of 

IV Lasix.in the morning change over to oral diuretics.  Replace potassium and 

magnesium, increase beta blocker.  Continue to monitor intake and output along 

with daily weights and daily lytes BUN and creatinine.  Further recommendations 

to follow.








DNP note has been reviewed, I agree with a documented findings and plan of 

care.  Patient was seen and examined.

## 2017-04-06 NOTE — XR
EXAMINATION TYPE: XR chest 1V portable

 

DATE OF EXAM: 4/6/2017 3:14 PM

 

COMPARISON: 4/6/2017

 

HISTORY: Shortness of breath

 

FINDINGS:

There are bilateral pleural effusions with cardiomegaly and bibasilar infiltrate.  There is a diffuse
 interstitial pattern. Diffuse osteopenia and arthropathy of the shoulders noted.

 

IMPRESSION:

1. Findings are compatible with CHF with bilateral infiltrate and small effusion greater on the left 
stable in appearance.

## 2017-04-06 NOTE — PN
DATE OF SERVICE: 04/06/2017



This 76-year-old woman who was admitted after recent surgery 

exploration at [facility] for ovarian cancer also had 

significant shortness of breath. The patient had CHF acute 

exacerbation. A 2-D echo with Doppler was done, which showed ejection 

fraction of about 55% to 60%, possibly diastolic dysfunction. LA was 

severely dilated to more than 40. Cardiology is following the patient 

closely at this time. The intake-output was noted.  



PAST MEDICAL HISTORY: Reviewed. 



REVIEW OF SYSTEMS:

CARDIOVASCULAR: As mentioned earlier. 

RESPIRATORY: As mentioned earlier.

GI: As mentioned earlier.

: No dysuria.

NERVOUS: No numbness, weakness. 



Current medications are:

1. Xanax 0.25 t.i.d. p.r.n. 

2. Augmentin 875 mg p.o. b.i.d. 

3. Aspirin 320 mg.

5. Vitamin D3. 

6. Lovenox. 

7. Lexapro. 

8. Diflucan.

9. Folic acid.

10. Lasix. 

11. Multivitamin. 

12. Lopressor. 



PHYSICAL EXAM: Patient alert and oriented x3. Pulse 74, blood pressure 

109/65, respirations 18, temperature 97 degrees, pulse ox 94% on room 

air. 

HEENT: Conjunctivae normal.

NECK: No jugular venous distension.

CARDIOVASCULAR: S1 and S2 muffled.

RESPIRATORY: Breath sounds diminished in the bases. A few scattered 

rhonchi and crackles. 

ABDOMEN: Soft, obese, status post surgery. Staples in place.

LEGS: No edema. 

NERVOUS SYSTEM: No focal deficit. 



LABS: WBC 5.6, hemoglobin is 11. Magnesium 1.4.  700. 



ASSESSMENT:

1. Congestive heart failure acute exacerbation with acute on chronic 

diastolic dysfunction, ejection fraction 50% to 55% with acute hypoxic 

respiratory failure, present on admission. Recent re-exploration 

surgery for ovarian cancer for cultures, ascites in abdominal cavity 

and from elsewhere.  

2. History of recent urinary tract infection with sepsis. 

3. History of recent acute renal failure. 

4. Hypoalbuminemia with mild to moderate protein-calorie malnutrition. 

5. History of ovarian cancer. 

6. History of gastroesophageal reflux disease. 

7. History of hypertension as mentioned earlier. 

8. History of gastric ulcer. 

9. History of degenerative joint disease. 

10. History of shingles.

11. History of abdominal hernia repair. 

12. History of anxiety, not otherwise specified. 

13. Anemia, normocytic anemia of chronic disease secondary to 

malignancy.  

14. Hypomagnesemia. 

15. FULL CODE. 



RECOMMENDATIONS AND DISCUSSION: In this 76-year-old woman who 

presented with multiple complex medical issues, we will monitor the 

patient closely, continue with the current medications, continue with 

symptomatic treatment. Patient is on diuretic at this time. I would 

recommend portable chest x-ray and otherwise monitor closely, continue 

to follow closely with Cardiology. Fluid- electrolyte balance. I would 

also recommend beta blockers and continue to monitor. Guarded 

prognosis because of multiple complex medical issues. Further 

recommendations to follow. Repeat labs . 



MTDD

## 2017-04-06 NOTE — CDI
In responding to this query, please exercise your independent professional 
judgment. The [facility] Coding Staff and Clinical Documentation Specialists 
appreciate your 

assistance in clarifying documentation, maintaining compliance with coding 
guidelines, accurately documenting patients condition and capturing severity 
of illness. The fact that a question is asked does not imply that any 
particular answer is desired or expected. Communication forms are a method of 
clarifying documentation and are not made part of the Legal Health Record. 
Thank you in advance for your clarification.

 Last Revision, March 2016



Willie Leone

[address] [place] [address]



            Documentation Clarification Form



Date: 4/6/2017 10:49:00 AM

From: Gris Bernal

Phone: [phone]

MRN: [id number]

Admit Date: 4/5/2017 3:58:00 PM

Patient Name: Uzma Avelar 

Visit Number: [id number]





Dr. Rolan Levine



History/Risk Factors:

     CHF Exacerbation

     COPD

     Recent surgery for Ovarian Cancer

     Hypertension



Clinical Indicators: 

     Vital signs/Pulse oximetry:  RR 27, sats 99% - RR 28, sats 90% on 2L

     Lung/Breathing assessment: short of breath, labored, accessory muscle use



Treatment: 

     IV Lasix

     IV steroids x1

     O2 nasal cannula



In your professional opinion, can you please clarify if these findings signify 
one of the following conditions?  



   Acuity:

        o   Acute

        o   Chronic

        o   Acute on Chronic



   Respiratory Status:

        o   Respiratory failure with hypercapnia

        o   Respiratory failure with hypoxia

        o   Acute Respiratory Distress

        o   Other Diagnosis, please specify

        o   Unable to determine



Please document in your progress notes and discharge summary in order to 
capture severity of illness and risk of mortality. Include clinical findings 
that support your diagnosis.



            FYI: Press F11 to launch patient chart.



_____ Place X here if this finding has no clinical significance, is not 
applicable or if you are not able to provide any additional documentation.

NELSON

## 2017-04-06 NOTE — HP
DATE OF ADMISSION:  04/05/2017





CHIEF COMPLAINT: Shortness of breath. 



HISTORY OF PRESENT ILLNESS: This 76-year-old woman with a past medical 

history of multiple medical problems, including history of COPD , 

GERD, congestive heart failure, history of gastric ulcers, 

osteoporosis, history of bilateral DJD, history of anxiety, being 

followed by Dr. Lay in the outpatient setting, was recently 

admitted to [facility] after extensive surgery for ovarian 

cancer in [facility].  During that admission Dr. Simmons 

evaluated the patient.  The patient had ascites as well as some air in 

the abdominal cavity.  There were concerns about bowel leak and the 

patient was subsequently transferred to [facility] per [facility] GYN/ONC surgeon's recommendations.  Apparently the 

patient had surgery through the same incision and apparently there was 

no significant leak during that time according to the patient but the 

results are not available. The patient improved significantly. The 

patient was sent home.   The stapes were to be removed today but the 

patient became increasing shortness of breath and the patient also 

gained some weight and because of multiple complications, the patient 

came to [facility] and admitted to the hospital for further 

evaluation and treatment. The patient had features of CHF and BNP was 

also elevated up to 2090.  The patient admitted to the hospital for 

further evaluation and treatment.  There is no history of any fever, 

rigors. No history of headache, loss of consciousness or seizures at 

this time. There is no chest pain or palpation, hematochezia or melena 

either.    



Past medical history of recent surgery as mentioned earlier.  History 

of ovarian cancer,  COPD, GERD, hypertension, history of gastric 

ulcers, osteoporosis, history of anxiety. 



Medications prior to admission include home medications are:

1. Diflucan 200 mg p.o. daily.

2. Calcium 1200 mg p.o. daily. 

3. Vitamin C 500 mg daily. 

4. Augmentin 875 mg p.o. b.i.d. 

5. Norco 10 mg q.4h p.r.n. 

6. Xanax 0.5 b.i.d. 

7. Multivitamins one p.o. daily.

8. Toprol XL 25 mg.

9. Lexapro 10 mg p.o. q6h.

10. Lovenox 40 mg subcu daily. 

11. Vitamin D3 2000 daily. 



ALLERGIES: None. 



FAMILY HISTORY: History of congestive heart failure, rheumatic fever 

and cancer in the family.  



SOCIAL HISTORY:  No history of smoking, no history of alcohol intake. 



REVIEW OF SYSTEMS:

ENT: No diminished vision. No diminished hearing.  

CARDIOVASCULAR: As mentioned earlier. 

RESPIRATORY:  As mentioned earlier. 

GI: As mentioned earlier. 

GENITOURINARY: As mentioned earlier. Nervous system: No numbness or 

weakness. 

ALLERGY/IMMUNOLOGY: No asthma or hayfever. 

MUSCULOSKELETAL: As mentioned earlier.

HEMATOLOGY/ONCOLOGY: No history of anemia. 

ENDOCRINE: No history of diabetes, hypothyroidism. 

CONSTITUTIONAL: As mentioned earlier.  

PSYCHIATRY: As mentioned earlier.



PHYSICAL EXAMINATION: 

GENERAL: Alert and oriented times three.  

VITAL SIGNS:  Pulse 83.  Blood pressure 130/63.  Respirations 20, 

temperature 97.2, pulse ox 92% on 2 liters. 

HEENT: Conjunctivae normal.  Oral mucosa moist. 

NECK: No jugular venous distention. No thyroid enlargement.  No 

carotid bruit.   

CARDIOVASCULAR: S1, S2 muffled.  No S3, no S4. 

RESPIRATORY: Breath sounds diminished at the bases. Bilateral 

scattered rhonchi and some crackles also.  acc muscles are acting. in distress.

ABDOMEN: Soft and slightly obese, status post recent surgery.   

Extensive incision for the laparotomy appears to be healthy. Staples 

are in place. No evidence of erythema or discharge. 

LEGS:  Bilateral leg present.  Pitting.  Pulses felt normally. Nervous 

system: Higher function as mentioned earlier.  Moves all four limbs.  

No focal motor or sensory deficits.   

LYMPHATICS: No lymph nodes palpable in the neck, axillae or groin.  

 

SKIN: No ulcer, rash or bleeding. 



LABS: At this time show WBC 10.5, hemoglobin is 10.3 and glucose 100, 

magnesium 1.4, albumin 2.7,  chest x-ray personally reviewed by me 

shows evidence of congestive heart failure and bilateral pleural 

effusions also. The EKG was reviewed and shows multiple PACs almost to 

the tune of bigeminy.  



ASSESSMENT:

1. Congestive heart failure, acute exacerbation, with ejection 

fraction unknown.with acute hypoxic respiratory failure present on admission  

2. Recent re-exploration and surgery for ovarian cancer for concerns 

of ascites and abdominal cavity air from elsewhere.  

3. History of recent urinary tract infection with sepsis. 

4. History of recent acute renal failure. 

5. Hypoalbuminemia with mild to moderate protein calorie malnutrition. 

6. History of any ovarian cancers. 

7. History of gastroesophageal reflux disease.

8. Essential hypertension.

9. History of gastric ulcers. 

10. History of degenerative joint disease. 

11. History of shingles. 

12. History of abdominal hernia repair. 

13. History of anxiety, not otherwise specified. 

14. Anemia, normocytic anemia of chronic disease and secondary to 

malignancy.  

15. Hypomagnesemia. 



RECOMMENDATIONS AND DISCUSSION: In this 76-year-old woman who 

presented with multiple complex medical issues.  We will monitor the 

patient closely. Continue the current medications. Continue 

symptomatic treatment. I would recommend IV intravenous Lasix and 

monitor fluid and electrolytes   closely.  Recommend ACE inhibitors. 

Repeat labs. Cardiology consultation. 2-D echo with Doppler. The EKG 

noted. The troponins are 0.023. Repeat troponins in the morning as 

well.  Prognosis guarded because of multiple complex medical issues.  

Further recommendations to follow.  We will also get in touch with 

GYN/ONC surgery and regarding the staple removal (    ) also.  Copy of 

dictation being forwarded to Dr. Lay who is the primary physician.  

  



NELSON

## 2017-04-07 LAB
ANION GAP SERPL CALC-SCNC: 13 MMOL/L
BASOPHILS # BLD AUTO: 0 K/UL (ref 0–0.2)
BASOPHILS NFR BLD AUTO: 0 %
BUN SERPL-SCNC: 19 MG/DL (ref 7–17)
CALCIUM SPEC-MCNC: 8.7 MG/DL (ref 8.4–10.2)
CH: 28.8
CHCM: 31.8
CHLORIDE SERPL-SCNC: 96 MMOL/L (ref 98–107)
CO2 SERPL-SCNC: 33 MMOL/L (ref 22–30)
EOSINOPHIL # BLD AUTO: 0.1 K/UL (ref 0–0.7)
EOSINOPHIL NFR BLD AUTO: 0 %
ERYTHROCYTE [DISTWIDTH] IN BLOOD BY AUTOMATED COUNT: 3.85 M/UL (ref 3.8–5.4)
ERYTHROCYTE [DISTWIDTH] IN BLOOD: 13.2 % (ref 11.5–15.5)
GLUCOSE SERPL-MCNC: 113 MG/DL (ref 74–99)
HCT VFR BLD AUTO: 35 % (ref 34–46)
HDW: 3.07
HGB BLD-MCNC: 11.4 GM/DL (ref 11.4–16)
LUC NFR BLD AUTO: 2 %
LYMPHOCYTES # SPEC AUTO: 2 K/UL (ref 1–4.8)
LYMPHOCYTES NFR SPEC AUTO: 14 %
MAGNESIUM SPEC-SCNC: 1.8 MG/DL (ref 1.6–2.3)
MCH RBC QN AUTO: 29.6 PG (ref 25–35)
MCHC RBC AUTO-ENTMCNC: 32.6 G/DL (ref 31–37)
MCV RBC AUTO: 90.9 FL (ref 80–100)
MONOCYTES # BLD AUTO: 1 K/UL (ref 0–1)
MONOCYTES NFR BLD AUTO: 7 %
NEUTROPHILS # BLD AUTO: 10.6 K/UL (ref 1.3–7.7)
NEUTROPHILS NFR BLD AUTO: 76 %
NON-AFRICAN AMERICAN GFR(MDRD): >60
POTASSIUM SERPL-SCNC: 4 MMOL/L (ref 3.5–5.1)
SODIUM SERPL-SCNC: 142 MMOL/L (ref 137–145)
WBC # BLD AUTO: 0.29 10*3/UL
WBC # BLD AUTO: 14 K/UL (ref 3.8–10.6)
WBC (PEROX): 14.42

## 2017-04-07 RX ADMIN — ESCITALOPRAM OXALATE SCH MG: 10 TABLET, FILM COATED ORAL at 20:42

## 2017-04-07 RX ADMIN — HYDROCODONE BITARTRATE AND ACETAMINOPHEN PRN EACH: 10; 325 TABLET ORAL at 23:19

## 2017-04-07 RX ADMIN — THERA TABS SCH EACH: TAB at 12:10

## 2017-04-07 RX ADMIN — NYSTATIN SCH UNIT: 100000 SUSPENSION ORAL at 12:10

## 2017-04-07 RX ADMIN — ASPIRIN 325 MG ORAL TABLET SCH MG: 325 PILL ORAL at 08:15

## 2017-04-07 RX ADMIN — CALCIUM CARBONATE (ANTACID) CHEW TAB 500 MG SCH MG: 500 CHEW TAB at 08:14

## 2017-04-07 RX ADMIN — FOLIC ACID SCH MG: 1 TABLET ORAL at 12:10

## 2017-04-07 RX ADMIN — Medication SCH MG: at 12:10

## 2017-04-07 RX ADMIN — AMOXICILLIN AND CLAVULANATE POTASSIUM SCH EACH: 875; 125 TABLET, FILM COATED ORAL at 08:15

## 2017-04-07 RX ADMIN — ATORVASTATIN CALCIUM SCH MG: 10 TABLET, FILM COATED ORAL at 20:42

## 2017-04-07 RX ADMIN — Medication SCH UNIT: at 08:14

## 2017-04-07 RX ADMIN — METOPROLOL TARTRATE SCH MG: 25 TABLET, FILM COATED ORAL at 09:32

## 2017-04-07 RX ADMIN — ENOXAPARIN SODIUM SCH MG: 40 INJECTION SUBCUTANEOUS at 08:13

## 2017-04-07 RX ADMIN — FLUCONAZOLE SCH MG: 100 TABLET ORAL at 08:14

## 2017-04-07 RX ADMIN — NYSTATIN SCH UNIT: 100000 SUSPENSION ORAL at 16:59

## 2017-04-07 RX ADMIN — NYSTATIN SCH UNIT: 100000 SUSPENSION ORAL at 20:42

## 2017-04-07 RX ADMIN — FUROSEMIDE SCH MG: 10 INJECTION, SOLUTION INTRAMUSCULAR; INTRAVENOUS at 08:13

## 2017-04-07 RX ADMIN — FUROSEMIDE SCH MG: 40 TABLET ORAL at 16:59

## 2017-04-07 RX ADMIN — AMOXICILLIN AND CLAVULANATE POTASSIUM SCH EACH: 875; 125 TABLET, FILM COATED ORAL at 20:42

## 2017-04-07 RX ADMIN — HYDROCODONE BITARTRATE AND ACETAMINOPHEN PRN EACH: 10; 325 TABLET ORAL at 15:22

## 2017-04-07 RX ADMIN — METOPROLOL TARTRATE SCH MG: 25 TABLET, FILM COATED ORAL at 20:42

## 2017-04-07 RX ADMIN — NYSTATIN SCH UNIT: 100000 SUSPENSION ORAL at 08:13

## 2017-04-07 NOTE — P.CNPUL
History of Present Illness


Consult date: 17


Reason for consult: dyspnea


History of present illness: 





76-year-old female patient with previous history of ovarian cancer status post 

debulking surgery followed by systemic chemotherapy, who was recently diagnosed 

having a colon cancer status post colectomy, presented to the hospital because 

of worsening shortness of breath or lower extremities edema.  The patient was 

having, increased dyspnea with limited amount of activity and some symptoms of 

orthopnea.  No cough.  No chest pain.  No sputum production.  No fever.  No 

chills.  No pleurisy.  No hemoptysis.  The chest x-ray showed small bilateral 

pleural effusions most on the left.  The echocardiogram showed a ejection 

fraction of 55-60% and there was diastolic dysfunction with severe dilatation 

of the left atrium and moderate aortic regurgitation with mild aortic stenosis 

and right-sided pulmonary artery pressures of 42 mmHg.  The patient was placed 

on IV diuretics and the patient has diuresed more than 3 L.  This morning, the 

patient continues to have some edema in lower extremities bilaterally and the 

patient has +1 pitting edema.  Nevertheless, she is improved significantly and 

she is less shortness of breath.  She was taken off oxygen and her pulse ox 

above 90% at this point.  Subsequent chest x-rays were reviewed and there is 

still small bilateral pleural effusion lung bases although overall heart 

failure pictures improved.  Cardiology is on the case.





Review of Systems





As above





Past Medical History


Past Medical History: Cancer, Heart Failure, COPD, GERD/Reflux, Hypertension


Additional Past Medical History / Comment(s): Ovarian cancer status post total 

abdominal hysterectomy and bilateral salpingo-oophorectomy and status post 

systemic chemotherapy, colon cancer status post colectomy, peptic ulcer disease

, osteoporosis, moderate aortic regurgitation with mild aortic stenosis, mild 

mitral regurgitation and secondary pulmonary hypertension as evident on 

echocardiogram, chronic anxiety, shingles, osteoarthritis


History of Any Multi-Drug Resistant Organisms: None Reported


Past Surgical History: Hernia Repair, Hysterectomy


Additional Past Surgical History / Comment(s): ELVIA and BSO, colectomy, 

abdominal hernia repair


Past Anesthesia/Blood Transfusion Reactions: No Reported Reaction


Additional Past Anesthesia/Blood Transfusion Reaction / Comment(s): Pt has 

never recieved blood.


Past Psychological History: Anxiety


Additional Psychological History / Comment(s): Pt lives alone in a single story 

home that has 1 step to get into.  She has a very supportive family and good 

friends and neighbors.  She is normally active and still works at [employer]. 

since recent sx was set up with [facility]- home care had first visist 

today.. has no hospital equipment.


Smoking Status: Never smoker


Past Alcohol Use History: None Reported


Past Drug Use History: None Reported





- Past Family History


  ** Father


Family Medical History: Cancer, Congestive Heart Failure (CHF)


Additional Family Medical History / Comment(s): Father had rheumatic fever as a 

child.  He  in his 60s.





  ** Mother


Family Medical History: Congestive Heart Failure (CHF)





Medications and Allergies


 Home Medications











 Medication  Instructions  Recorded  Confirmed  Type


 


Escitalopram [Lexapro] 10 mg PO HS 05/05/15 04/05/17 History


 


Cholecalciferol [Vitamin D3] 2,000 unit PO DAILY 16 History


 


ALPRAZolam [Xanax] 0.25 mg PO BID PRN 17 History


 


Enoxaparin [Lovenox] 40 mg SQ DAILY 17 History


 


Metoprolol Succinate [Toprol XL] 25 mg PO DAILY 17 History


 


Multivits-Min/Iron/FA/Lutein 1 tab PO DAILY 17 History





[Centrum Silver Women Tablet]    


 


Amoxicillin/Potassium Clav 1 tab PO Q12HR 17 History





[Augmentin 875-125 Tablet]    


 


Ascorbic Acid [Vitamin C] 500 mg PO DAILY 17 History


 


Calcium Carbonate [Calcium] 1,200 mg PO DAILY 17 History


 


Fluconazole [Diflucan] 200 mg PO DAILY 17 History


 


HYDROcodone/APAP 10-325MG [Norco 1 tab PO Q4HR PRN 17 History





]    











 Allergies











Allergy/AdvReac Type Severity Reaction Status Date / Time


 


No Known Allergies Allergy   Verified 17 12:01














Physical Exam


Vitals: 


 Vital Signs











  Temp Pulse Resp BP Pulse Ox


 


 17 11:15   58 L  16  


 


 17 11:14  97.0 F L  58 L  16  112/73  95


 


 17 08:51      95


 


 17 08:00  96.8 F L  64  16  131/60  95


 


 04/07/17 04:00  96.8 F L  62  18  156/70  94 L


 


 17 00:00  97.4 F L  67  18  127/79  92 L


 


 17 20:00  97.3 F L  70  18  148/79  93 L


 


 17 15:56  96.7 F L  76  18  116/90  92 L








 Intake and Output











 17





 06:59 14:59 22:59


 


Intake Total  600 


 


Output Total 2550 1400 


 


Balance -2550 -800 


 


Intake:   


 


  Oral  600 


 


Output:   


 


  Urine 2550 1400 


 


Other:   


 


  # Voids  1 


 


  # Bowel Movements  1 


 


  Weight 65.1 kg  














Head exam was generally normal. There was no scleral icterus or corneal arcus. 

Mucous membranes were moist.Neck was supple and without jugular venous 

distension, thyromegaly, or carotid bruits. Carotids were easily palpable 

bilaterally. There was no adenopathy.  Lung sounds are diminished in lung bases 

bilaterally otherwise clear.  My normal heart abdomen is soft and nontender and 

there is no ascites.  Surgical wound site over the anterior abdominal wall 

essentially clean and intact and the staples will be removed.Examination of the 

extremities revealed easily palpable radial, femoral and pedal pulses. There 

was no cyanosis, clubbing or edema.





Results





- Laboratory Findings


CBC and BMP: 


 17 06:46





 17 06:46


PT/INR, D-dimer











PT  11.8 sec (9.0-12.0)   17  11:30    


 


INR  1.2  (<1.1)   17  11:30    








Abnormal lab findings: 


 Abnormal Labs











  17





  05:57 05:57 06:46


 


WBC    14.0 H


 


RBC  3.67 L  


 


Hgb  11.0 L  


 


Hct  32.7 L  


 


Neutrophils #    10.6 H


 


Lymphocytes #  0.9 L  


 


Chloride   96 L 


 


Carbon Dioxide   32 H 


 


BUN   


 


Glucose   174 H 


 


Calcium   8.2 L 


 


Magnesium   1.4 L 


 


LDL Cholesterol, Calc   100 H 


 


HDL Cholesterol   31 L 














  17





  06:46


 


WBC 


 


RBC 


 


Hgb 


 


Hct 


 


Neutrophils # 


 


Lymphocytes # 


 


Chloride  96 L


 


Carbon Dioxide  33 H


 


BUN  19 H


 


Glucose  113 H


 


Calcium 


 


Magnesium 


 


LDL Cholesterol, Calc 


 


HDL Cholesterol 














- Diagnostic Findings


Chest x-ray: image reviewed





Assessment and Plan


Plan: 





Assessment





1 small bilateral pleural effusion, improving with diuresis, rule out secondary 

to CHF.  Malignant effusion cannot be completely excluded





2 CHF with diastolic dysfunction and valvular disease with mild to moderate 

aortic and mitral regurgitation secondary pulmonary hypertension





3 increased lower oximetry edema improving with diuresis





4 ovarian cancer status post debulking surgery followed by systemic 

chemotherapy.





5 colon cancer status post colectomy





6 shortness of breath secondary to above, improving





7 gastric ulcer, history of





8 osteoporosis





9 osteoarthritis





10 hypertension





11 reflux





Plan





Noted for thoracentesis.  The pleural effusions are small at this point.  

Continue diuresis.  Contact us back should there be any worsening shortness of 

breath and/or there is progression in size of the pleural effusions.

## 2017-04-07 NOTE — PN
DATE OF SERVICE: 04/07/2017



This 76-year-old woman who was admitted with CHF acute exacerbation  

also had recent re-exploration of the abdomen and expiratory 

laparotomy for concerns of bowel leakage which was leak from elsewhere 

at [facility]. The patient is feeling much better. Chest X 

showed bilateral lower lobe lesions and as well as bilateral pleural 

effusion.  Also cardiology and pulmonology are following the patient 

closely, ejection fraction found to be 55-60%.  The patient had 1+ 

pitting edema in both lower limbs. The patient also has history of 

ovarian cancer.  



PAST MEDICAL HISTORY: Reviewed. 



REVIEW OF SYSTEMS: 

CARDIOVASCULAR: S1, S2 muffled.

RESPIRATORY: as mentioned earlier. 

GI: As mentioned earlier. 

:  As mentioned earlier.

CENTRAL NERVOUS SYSTEM: No numbness or weakness. 



Current medications are reviewed and include:

1. Norco 10 mg q.4 p.r.n. 

2. Xanax 0.25 p.o. t.i.d. 

4. Aspirin 81 mg.

5. Lipitor 10 mg q.h.s. 

6. TUMS 1000 mg daily. 

7. Vitamin D3 2000 daily.

8. Lovenox 40 subcu daily. 

9. Lexapro 10 mg.

10. Diflucan 200 mg daily. 

11. Folic acid 1 mg.

12. Lasix 40 mg p.o. b.i.d. 

13. Lopressor 25 mg p.o. b.i.d. 

14. Multivitamins 1 p.o. daily. 

15. Vitamin B1 100 mg p.o. daily. 



PHYSICAL EXAMINATION: Patient is alert and oriented times three.  

Pulse 84, blood pressure 140/62, respirations 18, temperature 96.7, 

pulse ox 94% on 2 L.   

HEENT: Conjunctivae normal. Oral mucosa moist. 

NECK: No jugular venous distention.  No carotid bruit. No lymph node 

enlargement.  

CARDIOVASCULAR: S1, S2 muffled.  No S3, no S4. 

RESPIRATORY: Breath sounds diminished at the bases and a few scattered 

rhonchi and crackles.   

ABDOMEN: Soft. Mild diffuse distention. Otherwise, status post recent 

surgery.  No guarding. No rigidity. No tenderness. Bowel sounds 

present. No ascites. Legs: No edema.  No swelling.   

CENTRAL NERVOUS SYSTEM: No focal deficits. 



LABS: WBC 14. Sodium 140, potassium 4, Magnesium 1.8, . 



ASSESSMENT:

1. Congestive heart failure acute exacerbation with acute on chronic 

diastolic dysfunction, ejection 50% to 55% with acute hypoxic 

respiratory failure, present on admission.  

2. Recent surgery for ovarian cancer and ascites possible 

intraabdominal cavity air from elsewhere with negative findings.  

3. History of recent urinary tract infection with sepsis. 

4. History of recent acute renal failure. 

5. Hypoalbuminemia with mild to moderate protein calorie malnutrition. 

6. History of ovarian cancer. 

7. History of gastroesophageal reflux disease. 

8. Hypertension. 

9. Bilateral pleural effusions. 

10. History of gastric ulcer. 

11. History of degenerative joint disease.

12. History of shingles. 

13. History of abdominal hernia repair. 

14. Anxiety, not otherwise specified. 

15. Anemia, normocytic, chronic disease or secondary to malignancy. 

16. Hypomagnesemia. 

17. FULL CODE.



RECOMMENDATIONS AND DISCUSSION: In this 76 -year-old woman who 

presented with multiple complex medical issues, we will monitor the 

patient closely. Continue the current medications. Continue 

symptomatic treatment.  Otherwise, I would recommend closely monitor.  

 Continue the diuretics. We will monitor the patient closely. Increase 

ambulation.  Closely follow with cardiology and pulmonology. Guarded 

prognosis because of multiple complex medical issues.  Discussed with 

the patient.  Understands and agrees.   



NELSON

## 2017-04-07 NOTE — PN
Mrs. Avelar is comfortable resting, and her oxygen saturation at rest 

is quite good. I am going to check it with activity. She has some 

volume overload picture, but at this time her BNP is normal. She is 

breathing better. Lungs are clearer. I am recommending that if she 

continues to have any desaturation with activity, we should probably 

consider pulmonary evaluation to look for any interstitial 

pneumonia-type picture, but otherwise no intervention necessary. Will 

continue same medications, increase activity. Vital signs are stable. 

There is no JVD. S1, S2 heard normally. Lungs are clear. Abdomen and 

lower extremity exam is unremarkable.

## 2017-04-07 NOTE — XR
EXAMINATION TYPE: XR chest 2V

 

DATE OF EXAM: 4/7/2017 6:45 AM

 

COMPARISON: 4/6/2017

 

TECHNIQUE: PA and lateral views submitted.

 

HISTORY: Shortness of breath

 

FINDINGS:

Bilateral consolidation and pleural effusion persist. There is mild interstitium. Cardiomegaly stable
. Arthropathy shoulders.

 

IMPRESSION:

1. Bilateral infiltrate and pleural effusion stable. There is mild improvement of the interstitium co
mpatible with reducing vascular congestion

## 2017-04-08 LAB
ANION GAP SERPL CALC-SCNC: 10 MMOL/L
BASOPHILS # BLD AUTO: 0 K/UL (ref 0–0.2)
BASOPHILS NFR BLD AUTO: 0 %
BUN SERPL-SCNC: 17 MG/DL (ref 7–17)
CALCIUM SPEC-MCNC: 8.4 MG/DL (ref 8.4–10.2)
CH: 29
CHCM: 31.9
CHLORIDE SERPL-SCNC: 99 MMOL/L (ref 98–107)
CO2 SERPL-SCNC: 30 MMOL/L (ref 22–30)
EOSINOPHIL # BLD AUTO: 0.2 K/UL (ref 0–0.7)
EOSINOPHIL NFR BLD AUTO: 2 %
ERYTHROCYTE [DISTWIDTH] IN BLOOD BY AUTOMATED COUNT: 3.66 M/UL (ref 3.8–5.4)
ERYTHROCYTE [DISTWIDTH] IN BLOOD: 13.4 % (ref 11.5–15.5)
GLUCOSE SERPL-MCNC: 92 MG/DL (ref 74–99)
HCT VFR BLD AUTO: 33.3 % (ref 34–46)
HDW: 3.03
HGB BLD-MCNC: 10.4 GM/DL (ref 11.4–16)
LUC NFR BLD AUTO: 2 %
LYMPHOCYTES # SPEC AUTO: 1.5 K/UL (ref 1–4.8)
LYMPHOCYTES NFR SPEC AUTO: 19 %
MAGNESIUM SPEC-SCNC: 1.5 MG/DL (ref 1.6–2.3)
MCH RBC QN AUTO: 28.4 PG (ref 25–35)
MCHC RBC AUTO-ENTMCNC: 31.2 G/DL (ref 31–37)
MCV RBC AUTO: 91 FL (ref 80–100)
MONOCYTES # BLD AUTO: 0.8 K/UL (ref 0–1)
MONOCYTES NFR BLD AUTO: 9 %
NEUTROPHILS # BLD AUTO: 5.6 K/UL (ref 1.3–7.7)
NEUTROPHILS NFR BLD AUTO: 68 %
NON-AFRICAN AMERICAN GFR(MDRD): >60
POTASSIUM SERPL-SCNC: 3.6 MMOL/L (ref 3.5–5.1)
SODIUM SERPL-SCNC: 139 MMOL/L (ref 137–145)
WBC # BLD AUTO: 0.16 10*3/UL
WBC # BLD AUTO: 8.3 K/UL (ref 3.8–10.6)
WBC (PEROX): 9.22

## 2017-04-08 RX ADMIN — HYDROCODONE BITARTRATE AND ACETAMINOPHEN PRN EACH: 10; 325 TABLET ORAL at 21:37

## 2017-04-08 RX ADMIN — Medication SCH MG: at 12:26

## 2017-04-08 RX ADMIN — Medication SCH UNIT: at 08:34

## 2017-04-08 RX ADMIN — ESCITALOPRAM OXALATE SCH MG: 10 TABLET, FILM COATED ORAL at 20:05

## 2017-04-08 RX ADMIN — NYSTATIN SCH UNIT: 100000 SUSPENSION ORAL at 08:33

## 2017-04-08 RX ADMIN — POTASSIUM CHLORIDE SCH MEQ: 20 TABLET, EXTENDED RELEASE ORAL at 13:46

## 2017-04-08 RX ADMIN — METOPROLOL TARTRATE SCH MG: 25 TABLET, FILM COATED ORAL at 20:05

## 2017-04-08 RX ADMIN — AMOXICILLIN AND CLAVULANATE POTASSIUM SCH EACH: 875; 125 TABLET, FILM COATED ORAL at 20:05

## 2017-04-08 RX ADMIN — ASPIRIN 81 MG CHEWABLE TABLET SCH MG: 81 TABLET CHEWABLE at 08:34

## 2017-04-08 RX ADMIN — HYDROCODONE BITARTRATE AND ACETAMINOPHEN PRN EACH: 10; 325 TABLET ORAL at 15:18

## 2017-04-08 RX ADMIN — MAGNESIUM SULFATE IN DEXTROSE SCH MLS/HR: 10 INJECTION, SOLUTION INTRAVENOUS at 14:53

## 2017-04-08 RX ADMIN — MAGNESIUM SULFATE IN DEXTROSE SCH MLS/HR: 10 INJECTION, SOLUTION INTRAVENOUS at 13:46

## 2017-04-08 RX ADMIN — ENOXAPARIN SODIUM SCH MG: 40 INJECTION SUBCUTANEOUS at 08:34

## 2017-04-08 RX ADMIN — THERA TABS SCH EACH: TAB at 12:26

## 2017-04-08 RX ADMIN — METOPROLOL TARTRATE SCH MG: 25 TABLET, FILM COATED ORAL at 08:34

## 2017-04-08 RX ADMIN — CALCIUM CARBONATE (ANTACID) CHEW TAB 500 MG SCH MG: 500 CHEW TAB at 08:35

## 2017-04-08 RX ADMIN — ATORVASTATIN CALCIUM SCH MG: 10 TABLET, FILM COATED ORAL at 20:05

## 2017-04-08 RX ADMIN — NYSTATIN SCH UNIT: 100000 SUSPENSION ORAL at 17:23

## 2017-04-08 RX ADMIN — NYSTATIN SCH UNIT: 100000 SUSPENSION ORAL at 21:46

## 2017-04-08 RX ADMIN — HYDROCODONE BITARTRATE AND ACETAMINOPHEN PRN EACH: 10; 325 TABLET ORAL at 03:38

## 2017-04-08 RX ADMIN — FUROSEMIDE SCH MG: 40 TABLET ORAL at 08:34

## 2017-04-08 RX ADMIN — FLUCONAZOLE SCH MG: 100 TABLET ORAL at 08:34

## 2017-04-08 RX ADMIN — FUROSEMIDE SCH MG: 40 TABLET ORAL at 15:16

## 2017-04-08 RX ADMIN — AMOXICILLIN AND CLAVULANATE POTASSIUM SCH EACH: 875; 125 TABLET, FILM COATED ORAL at 08:34

## 2017-04-08 RX ADMIN — FOLIC ACID SCH MG: 1 TABLET ORAL at 12:26

## 2017-04-08 RX ADMIN — NYSTATIN SCH UNIT: 100000 SUSPENSION ORAL at 12:26

## 2017-04-08 NOTE — P.PN
Subjective





76-year-old female patient with previous history of ovarian cancer status post 

debulking surgery followed by systemic chemotherapy, who was recently diagnosed 

having a colon cancer status post colectomy, presented to the hospital because 

of worsening shortness of breath or lower extremities edema.  The patient was 

having, increased dyspnea with limited amount of activity and some symptoms of 

orthopnea.  No cough.  No chest pain.  No sputum production.  No fever.  No 

chills.  No pleurisy.  No hemoptysis.  The chest x-ray showed small bilateral 

pleural effusions most on the left.  The echocardiogram showed a ejection 

fraction of 55-60% and there was diastolic dysfunction with severe dilatation 

of the left atrium and moderate aortic regurgitation with mild aortic stenosis 

and right-sided pulmonary artery pressures of 42 mmHg.  The patient was placed 

on IV diuretics and the patient has diuresed more than 3 L.  This morning, the 

patient continues to have some edema in lower extremities bilaterally and the 

patient has +1 pitting edema.  Nevertheless, she is improved significantly and 

she is less shortness of breath.  She was taken off oxygen and her pulse ox 

above 90% at this point.  Subsequent chest x-rays were reviewed and there is 

still small bilateral pleural effusion lung bases although overall heart 

failure pictures improved.  Cardiology is on the case.





The patient is seen again today 04/08/2017 in follow-up on the regular medical 

floor.  She is awake and alert in no acute distress.  She denies any worsening 

shortness of breath, cough or congestion.  No hemoptysis.  She is maintaining 

good O2 saturation on room air.  She remains afebrile.  The lower extremity 

edema is improved but still at 1-2+.  She remains on diuretics.





Objective





- Vital Signs


Vital signs: 


 Vital Signs











Temp  97.3 F L  04/08/17 10:46


 


Pulse  55 L  04/08/17 10:46


 


Resp  17   04/08/17 10:46


 


BP  123/60   04/08/17 10:46


 


Pulse Ox  94 L  04/08/17 10:46








 Intake & Output











 04/07/17 04/08/17 04/08/17





 18:59 06:59 18:59


 


Intake Total 600 300 


 


Output Total 2200 1700 


 


Balance -1600 -1400 


 


Weight  62.8 kg 


 


Intake:   


 


  Oral 600 300 


 


Output:   


 


  Urine 2200 1700 


 


Other:   


 


  Voiding Method   Toilet


 


  # Voids 2 1 2


 


  # Bowel Movements 1  














- Exam





Head exam was generally normal. There was no scleral icterus or corneal arcus. 

Mucous membranes were moist.Neck was supple and without jugular venous 

distension, thyromegaly, or carotid bruits. Carotids were easily palpable 

bilaterally. There was no adenopathy.  Lung sounds are diminished in lung bases 

bilaterally otherwise clear.  My normal heart abdomen is soft and nontender and 

there is no ascites.  Surgical wound site over the anterior abdominal wall 

essentially clean and intact and the staples have been removed.Examination of 

the extremities revealed easily palpable radial, femoral and pedal pulses. 

There was no cyanosis, clubbing or edema.





- Labs


CBC & Chem 7: 


 04/08/17 06:20





 04/08/17 06:14


Labs: 


 Abnormal Lab Results - Last 24 Hours (Table)











  04/08/17 04/08/17 Range/Units





  06:14 06:20 


 


RBC   3.66 L  (3.80-5.40)  m/uL


 


Hgb   10.4 L  (11.4-16.0)  gm/dL


 


Hct   33.3 L  (34.0-46.0)  %


 


Magnesium  1.5 L   (1.6-2.3)  mg/dL














Assessment and Plan


Plan: 





Assessment





1 small bilateral pleural effusion, improving with diuresis, rule out secondary 

to CHF.  Malignant effusion cannot be completely excluded





2 CHF with diastolic dysfunction and valvular disease with mild to moderate 

aortic and mitral regurgitation secondary pulmonary hypertension





3 increased lower oximetry edema improving with diuresis





4 ovarian cancer status post debulking surgery followed by systemic 

chemotherapy.





5 colon cancer status post colectomy





6 shortness of breath secondary to above, improving





7 gastric ulcer, history of





8 osteoporosis





9 osteoarthritis





10 hypertension





11 reflux





Plan





The patient was seen and evaluated by Dr. Pena.  She is stable from the 

pulmonary standpoint.  There is no plans for thoracentesis.  Her effusion 

remains small.  She is maintaining good O2 saturations on room air.  No 

pulmonary complaints.  We'll increase her activity as tolerated.  We'll 

continue to follow.

## 2017-04-09 LAB
ANION GAP SERPL CALC-SCNC: 10 MMOL/L
BASOPHILS # BLD AUTO: 0.1 K/UL (ref 0–0.2)
BASOPHILS NFR BLD AUTO: 1 %
BUN SERPL-SCNC: 11 MG/DL (ref 7–17)
CALCIUM SPEC-MCNC: 8.7 MG/DL (ref 8.4–10.2)
CH: 28.9
CHCM: 31.5
CHLORIDE SERPL-SCNC: 99 MMOL/L (ref 98–107)
CO2 SERPL-SCNC: 28 MMOL/L (ref 22–30)
EOSINOPHIL # BLD AUTO: 0.2 K/UL (ref 0–0.7)
EOSINOPHIL NFR BLD AUTO: 3 %
ERYTHROCYTE [DISTWIDTH] IN BLOOD BY AUTOMATED COUNT: 3.88 M/UL (ref 3.8–5.4)
ERYTHROCYTE [DISTWIDTH] IN BLOOD: 13.4 % (ref 11.5–15.5)
GLUCOSE SERPL-MCNC: 100 MG/DL (ref 74–99)
HCT VFR BLD AUTO: 35.6 % (ref 34–46)
HDW: 2.97
HGB BLD-MCNC: 11.3 GM/DL (ref 11.4–16)
LUC NFR BLD AUTO: 3 %
LYMPHOCYTES # SPEC AUTO: 1.7 K/UL (ref 1–4.8)
LYMPHOCYTES NFR SPEC AUTO: 23 %
MAGNESIUM SPEC-SCNC: 1.6 MG/DL (ref 1.6–2.3)
MCH RBC QN AUTO: 29.1 PG (ref 25–35)
MCHC RBC AUTO-ENTMCNC: 31.7 G/DL (ref 31–37)
MCV RBC AUTO: 91.8 FL (ref 80–100)
MONOCYTES # BLD AUTO: 0.8 K/UL (ref 0–1)
MONOCYTES NFR BLD AUTO: 10 %
NEUTROPHILS # BLD AUTO: 4.6 K/UL (ref 1.3–7.7)
NEUTROPHILS NFR BLD AUTO: 60 %
NON-AFRICAN AMERICAN GFR(MDRD): >60
POTASSIUM SERPL-SCNC: 3.7 MMOL/L (ref 3.5–5.1)
SODIUM SERPL-SCNC: 137 MMOL/L (ref 137–145)
WBC # BLD AUTO: 0.26 10*3/UL
WBC # BLD AUTO: 7.6 K/UL (ref 3.8–10.6)
WBC (PEROX): 7.82

## 2017-04-09 RX ADMIN — AMOXICILLIN AND CLAVULANATE POTASSIUM SCH EACH: 875; 125 TABLET, FILM COATED ORAL at 10:03

## 2017-04-09 RX ADMIN — POTASSIUM CHLORIDE SCH MEQ: 20 TABLET, EXTENDED RELEASE ORAL at 10:06

## 2017-04-09 RX ADMIN — THERA TABS SCH EACH: TAB at 13:32

## 2017-04-09 RX ADMIN — FOLIC ACID SCH MG: 1 TABLET ORAL at 13:32

## 2017-04-09 RX ADMIN — FUROSEMIDE SCH MG: 40 TABLET ORAL at 17:06

## 2017-04-09 RX ADMIN — NYSTATIN SCH UNIT: 100000 SUSPENSION ORAL at 20:35

## 2017-04-09 RX ADMIN — ASPIRIN 81 MG CHEWABLE TABLET SCH MG: 81 TABLET CHEWABLE at 10:04

## 2017-04-09 RX ADMIN — NYSTATIN SCH UNIT: 100000 SUSPENSION ORAL at 13:32

## 2017-04-09 RX ADMIN — METOPROLOL TARTRATE SCH MG: 25 TABLET, FILM COATED ORAL at 20:34

## 2017-04-09 RX ADMIN — CALCIUM CARBONATE (ANTACID) CHEW TAB 500 MG SCH MG: 500 CHEW TAB at 10:04

## 2017-04-09 RX ADMIN — AMOXICILLIN AND CLAVULANATE POTASSIUM SCH EACH: 875; 125 TABLET, FILM COATED ORAL at 20:34

## 2017-04-09 RX ADMIN — HYDROCODONE BITARTRATE AND ACETAMINOPHEN PRN EACH: 10; 325 TABLET ORAL at 02:02

## 2017-04-09 RX ADMIN — HYDROCODONE BITARTRATE AND ACETAMINOPHEN PRN EACH: 10; 325 TABLET ORAL at 10:04

## 2017-04-09 RX ADMIN — NYSTATIN SCH UNIT: 100000 SUSPENSION ORAL at 10:04

## 2017-04-09 RX ADMIN — Medication SCH UNIT: at 10:04

## 2017-04-09 RX ADMIN — HYDROCODONE BITARTRATE AND ACETAMINOPHEN PRN EACH: 10; 325 TABLET ORAL at 18:31

## 2017-04-09 RX ADMIN — NYSTATIN SCH UNIT: 100000 SUSPENSION ORAL at 17:06

## 2017-04-09 RX ADMIN — FUROSEMIDE SCH MG: 40 TABLET ORAL at 10:05

## 2017-04-09 RX ADMIN — Medication SCH MG: at 13:32

## 2017-04-09 RX ADMIN — METOPROLOL TARTRATE SCH MG: 25 TABLET, FILM COATED ORAL at 10:05

## 2017-04-09 RX ADMIN — ATORVASTATIN CALCIUM SCH MG: 10 TABLET, FILM COATED ORAL at 20:34

## 2017-04-09 RX ADMIN — FLUCONAZOLE SCH MG: 100 TABLET ORAL at 10:04

## 2017-04-09 RX ADMIN — ENOXAPARIN SODIUM SCH MG: 40 INJECTION SUBCUTANEOUS at 10:04

## 2017-04-09 RX ADMIN — HYDROCODONE BITARTRATE AND ACETAMINOPHEN PRN EACH: 10; 325 TABLET ORAL at 22:50

## 2017-04-09 RX ADMIN — ESCITALOPRAM OXALATE SCH MG: 10 TABLET, FILM COATED ORAL at 20:34

## 2017-04-09 NOTE — P.PN
Subjective








76-year-old female patient with previous history of ovarian cancer status post 

debulking surgery followed by systemic chemotherapy, who was recently diagnosed 

having a colon cancer status post colectomy, presented to the hospital because 

of worsening shortness of breath or lower extremities edema.  The patient was 

having, increased dyspnea with limited amount of activity and some symptoms of 

orthopnea.  No cough.  No chest pain.  No sputum production.  No fever.  No 

chills.  No pleurisy.  No hemoptysis.  The chest x-ray showed small bilateral 

pleural effusions most on the left.  The echocardiogram showed a ejection 

fraction of 55-60% and there was diastolic dysfunction with severe dilatation 

of the left atrium and moderate aortic regurgitation with mild aortic stenosis 

and right-sided pulmonary artery pressures of 42 mmHg.  The patient was placed 

on IV diuretics and the patient has diuresed more than 3 L.  This morning, the 

patient continues to have some edema in lower extremities bilaterally and the 

patient has +1 pitting edema.  Nevertheless, she is improved significantly and 

she is less shortness of breath.  She was taken off oxygen and her pulse ox 

above 90% at this point.  Subsequent chest x-rays were reviewed and there is 

still small bilateral pleural effusion lung bases although overall heart 

failure pictures improved.  Cardiology is on the case.





The patient is seen again today 04/08/2017 in follow-up on the regular medical 

floor.  She is awake and alert in no acute distress.  She denies any worsening 

shortness of breath, cough or congestion.  No hemoptysis.  She is maintaining 

good O2 saturation on room air.  She remains afebrile.  The lower extremity 

edema is improved but still at 1-2+.  She remains on diuretics.





On 04/09/2017, the patient remains quite stable.  The patient is on no oxygen.  

The patient is receiving Lasix 40 mg by mouth twice a day.  Still has some 

lower extremity edema.  No abdominal distention.  No significant respiratory 

distress.  She is on oral Augmentin.  She is able to sit up on a chair without 

any major difficulties.  She is hemodynamically stable.








Objective





- Vital Signs


Vital signs: 


 Vital Signs











Temp  97.4 F L  04/09/17 15:00


 


Pulse  64   04/09/17 15:00


 


Resp  19   04/09/17 15:00


 


BP  108/63   04/09/17 15:00


 


Pulse Ox  95   04/09/17 15:00








 Intake & Output











 04/08/17 04/09/17 04/09/17





 18:59 06:59 18:59


 


Intake Total  300 


 


Balance  300 


 


Intake:   


 


  Oral  300 


 


Other:   


 


  Voiding Method Toilet Toilet 


 


  # Voids 2 2 3


 


  # Bowel Movements   1














- Exam











Head exam was generally normal. There was no scleral icterus or corneal arcus. 

Mucous membranes were moist.Neck was supple and without jugular venous 

distension, thyromegaly, or carotid bruits. Carotids were easily palpable 

bilaterally. There was no adenopathy.  Lung sounds are diminished in lung bases 

bilaterally otherwise clear.  My normal heart abdomen is soft and nontender and 

there is no ascites.  Surgical wound site over the anterior abdominal wall 

essentially clean and intact and the staples will be removed.Examination of the 

extremities revealed easily palpable radial, femoral and pedal pulses. There 

was no cyanosis, clubbing or edema.








- Labs


CBC & Chem 7: 


 04/09/17 07:21





 04/09/17 07:21


Labs: 


 Abnormal Lab Results - Last 24 Hours (Table)











  04/09/17 04/09/17 Range/Units





  07:21 07:21 


 


Hgb  11.3 L   (11.4-16.0)  gm/dL


 


Glucose   100 H  (74-99)  mg/dL














Assessment and Plan


Plan: 





Assessment





1 small bilateral pleural effusion, improving with diuresis, rule out secondary 

to CHF.  Malignant effusion cannot be completely excluded





2 CHF with diastolic dysfunction and valvular disease with mild to moderate 

aortic and mitral regurgitation secondary pulmonary hypertension





3 increased lower oximetry edema improving with diuresis





4 ovarian cancer status post debulking surgery followed by systemic 

chemotherapy.





5 colon cancer status post colectomy





6 shortness of breath secondary to above, improving





7 gastric ulcer, history of





8 osteoporosis





9 osteoarthritis





10 hypertension





11 reflux





Plan





Noted for thoracentesis.  The pleural effusions are small at this point.  

Continue diuresis.  The patient remains in a negative fluid balance.  She has 

diuresed more than 3 L over the past 24 hours.  She is clinically improving.  

No need for thoracentesis again, and discharge planning is in progress.

## 2017-04-09 NOTE — PN
DATE OF SERVICE:  04/09/2017



This 76-year-old woman who was admitted with congestive heart failure, 

acute exacerbation, also had recent surgery for ovarian cancer and 

ascites also.  Patient has bilateral leg edema.  The patient on 

diuretics. The patient improved significantly.  No chest pain.  No 

palpitations. No fever.  



On exam, alert and oriented times three. Pulse 68, blood pressure 

140/69,  respirations 18, temperature 98.9, pulse ox 99% on room air.  

HEENT: Conjunctivae normal.   

NECK: No jugular venous distention.

CARDIOVASCULAR: S1, S2 muffled. 

RESPIRATORY: Breath sounds diminished at the bases.   Scattered 

rhonchi and crackles.  

ABDOMEN: Soft, obese, status post recent surgery. 

LEGS: Minimal edema.  

CENTRAL NERVOUS SYSTEM: No focal deficits. 



LABS:  Hemoglobin 11.3. 



ASSESSMENT:

1. Congestive heart failure acute exacerbation, with acute on chronic 

diastolic dysfunction, ejection fraction 50% to 55% with acute hypoxic 

respiratory failure present on admission. 

2. Recent surgery for ovarian cancer and ascites, possible 

intra-abdominal cavity air from elsewhere with negative findings.  

3. History of recent urinary tract infection with sepsis. 

4. History of recent acute renal failure. 

5. Hypoalbuminemia with mild to moderate protein calorie malnutrition. 

6. History of ovarian cancer. 

7. History of gastroesophageal reflux disease. 

8. Hypertension essential. 

9. Bilateral pleural effusions. 

10. History of gastric ulcer. 

11. History of degenerative joint disease. 

12. History of shingles. 

13. History of abdominal hernia repair. 

14. Anxiety, not otherwise specified. 

15. Anemia, normocytic anemia of chronic disease and secondary 

malignancy.  

16. Hypomagnesemia.

17. FULL CODE.



RECOMMENDATIONS AND DISCUSSION: In this 76-year-old woman who 

presented with multiple complex medical issues, we will monitor the 

patient closely. Continue the current medications.  Continue 

symptomatic treatment.   Monitor fluid and electrolytes  balance 

closely. Otherwise, I would recommend continue with the diuretics and 

closely follow. Increase ambulation. Guarded prognosis.  Further 

recommendations to follow.

## 2017-04-09 NOTE — PN
DATE OF SERVICE: 04/08/2017 



This 76-year-old woman was admitted with congestive heart failure 

acute exacerbation is being closely monitored. The patient had recent 

surgery. No chest pain or palpitation. No fever. The patient is 

feeling much better. Cardiology and Pulmonology are following the 

patient closely.  



On exam, alert and oriented x3. Pulse 67, blood pressure 150/62, 

respirations 18, temperature 96.9, pulse ox 94% on room air.  

HEENT:  Conjunctivae normal.  

NECK:  No jugular venous distention. 

CARDIOVASCULAR: S1 and S2, muffled. 

RESPIRATORY:  Breath sounds diminished at the bases.  A few scattered 

rhonchi.  

ABDOMEN:  Soft, obese. Nontender. No guarding, no rigidity. 

LEGS: Minimal edema.  

NERVOUS SYSTEM: No focal deficits.  



LABS: WBC 8, hemoglobin 10.4. Magnesium is 1.5. 



ASSESSMENT: 

1. Congestive heart failure, acute exacerbation, with acute on chronic 

diastolic dysfunction, ejection fraction 50% to 55% with acute hypoxic 

respiratory failure, present on admission.  

2. Recent surgery for ovarian cancer and ascites, possible 

intra-abdominal cavity air from elsewhere with negative findings.  

3. History of recent urinary tract infection with sepsis. 

4. History of recent acute renal failure.

5. Hypoalbuminemia with mild to moderate protein calorie malnutrition. 

6. History of ovarian cancer. 

7. History of gastroesophageal reflux disease. 

8. Hypertension.

9. Bilateral pleural effusions. 

10. History of gastric ulcer. 

11. Degenerative joint disease. 

12. History of Shingles. 

13. History of abdominal hernia repair. 

14. Anxiety, not otherwise specified. 

15. Anemia, normocytic anemia of chronic disease secondary malignancy. 

16. Hypomagnesemia. 

17. FULL CODE. 

 

RECOMMENDATIONS AND DISCUSSION: I recommend to continue the current 

medications, continue monitoring and symptomatic treatment.  Otherwise 

at this time, I recommend to continue the diuretics, monitor fluid and 

electrolyte balance closely. Fluid restriction and closely follow with 

Cardiology and Pulmonology. Guarded prognosis because of multiple 

complex medical issues and further recommendations to follow.

## 2017-04-10 VITALS
HEART RATE: 63 BPM | RESPIRATION RATE: 14 BRPM | SYSTOLIC BLOOD PRESSURE: 117 MMHG | DIASTOLIC BLOOD PRESSURE: 73 MMHG | TEMPERATURE: 97 F

## 2017-04-10 LAB
ANION GAP SERPL CALC-SCNC: 13 MMOL/L
BASOPHILS # BLD AUTO: 0.1 K/UL (ref 0–0.2)
BASOPHILS NFR BLD AUTO: 1 %
BUN SERPL-SCNC: 11 MG/DL (ref 7–17)
CALCIUM SPEC-MCNC: 8.9 MG/DL (ref 8.4–10.2)
CH: 28.6
CHCM: 32.1
CHLORIDE SERPL-SCNC: 97 MMOL/L (ref 98–107)
CO2 SERPL-SCNC: 26 MMOL/L (ref 22–30)
EOSINOPHIL # BLD AUTO: 0.3 K/UL (ref 0–0.7)
EOSINOPHIL NFR BLD AUTO: 3 %
ERYTHROCYTE [DISTWIDTH] IN BLOOD BY AUTOMATED COUNT: 3.95 M/UL (ref 3.8–5.4)
ERYTHROCYTE [DISTWIDTH] IN BLOOD: 13.2 % (ref 11.5–15.5)
GLUCOSE SERPL-MCNC: 111 MG/DL (ref 74–99)
HCT VFR BLD AUTO: 35.3 % (ref 34–46)
HDW: 3.09
HGB BLD-MCNC: 11.7 GM/DL (ref 11.4–16)
LUC NFR BLD AUTO: 4 %
LYMPHOCYTES # SPEC AUTO: 2 K/UL (ref 1–4.8)
LYMPHOCYTES NFR SPEC AUTO: 22 %
MCH RBC QN AUTO: 29.5 PG (ref 25–35)
MCHC RBC AUTO-ENTMCNC: 33.1 G/DL (ref 31–37)
MCV RBC AUTO: 89.3 FL (ref 80–100)
MONOCYTES # BLD AUTO: 0.9 K/UL (ref 0–1)
MONOCYTES NFR BLD AUTO: 10 %
NEUTROPHILS # BLD AUTO: 5.3 K/UL (ref 1.3–7.7)
NEUTROPHILS NFR BLD AUTO: 60 %
NON-AFRICAN AMERICAN GFR(MDRD): >60
POTASSIUM SERPL-SCNC: 4 MMOL/L (ref 3.5–5.1)
SODIUM SERPL-SCNC: 136 MMOL/L (ref 137–145)
WBC # BLD AUTO: 0.35 10*3/UL
WBC # BLD AUTO: 8.8 K/UL (ref 3.8–10.6)
WBC (PEROX): 9.13

## 2017-04-10 RX ADMIN — AMOXICILLIN AND CLAVULANATE POTASSIUM SCH EACH: 875; 125 TABLET, FILM COATED ORAL at 08:48

## 2017-04-10 RX ADMIN — Medication SCH MG: at 12:56

## 2017-04-10 RX ADMIN — NYSTATIN SCH UNIT: 100000 SUSPENSION ORAL at 12:56

## 2017-04-10 RX ADMIN — NYSTATIN SCH UNIT: 100000 SUSPENSION ORAL at 08:48

## 2017-04-10 RX ADMIN — POTASSIUM CHLORIDE SCH MEQ: 20 TABLET, EXTENDED RELEASE ORAL at 08:48

## 2017-04-10 RX ADMIN — ASPIRIN 81 MG CHEWABLE TABLET SCH MG: 81 TABLET CHEWABLE at 08:48

## 2017-04-10 RX ADMIN — HYDROCODONE BITARTRATE AND ACETAMINOPHEN PRN EACH: 10; 325 TABLET ORAL at 13:01

## 2017-04-10 RX ADMIN — FOLIC ACID SCH MG: 1 TABLET ORAL at 12:56

## 2017-04-10 RX ADMIN — Medication SCH UNIT: at 08:48

## 2017-04-10 RX ADMIN — FLUCONAZOLE SCH MG: 100 TABLET ORAL at 08:48

## 2017-04-10 RX ADMIN — CALCIUM CARBONATE (ANTACID) CHEW TAB 500 MG SCH MG: 500 CHEW TAB at 08:48

## 2017-04-10 RX ADMIN — HYDROCODONE BITARTRATE AND ACETAMINOPHEN PRN EACH: 10; 325 TABLET ORAL at 07:08

## 2017-04-10 RX ADMIN — FUROSEMIDE SCH MG: 40 TABLET ORAL at 08:48

## 2017-04-10 RX ADMIN — THERA TABS SCH EACH: TAB at 12:56

## 2017-04-10 RX ADMIN — METOPROLOL TARTRATE SCH MG: 25 TABLET, FILM COATED ORAL at 08:48

## 2017-04-10 RX ADMIN — ENOXAPARIN SODIUM SCH MG: 40 INJECTION SUBCUTANEOUS at 08:49

## 2017-04-10 NOTE — XR
EXAMINATION TYPE: XR chest 1V portable

 

DATE OF EXAM: 4/10/2017 7:21 AM

 

COMPARISON: 4/7/2017

 

HISTORY: Shortness of breath

 

TECHNIQUE: Single frontal view of the chest is obtained.

 

FINDINGS:  There are small bilateral effusions which are improved from the previous exam with reduced
 consolidation at the left lung base.

 

Arthropathy shoulders. No overt failure. No pneumothorax. Heart size stable.

 

IMPRESSION:  

1. Improving bilateral infiltrate and small effusion.

## 2017-04-11 NOTE — DS
DATE OF ADMISSION:   04/05/2017

DATE OF DISCHARGE:   04/10/2017



FINAL DIAGNOSES: 

1. Congestive heart failure acute exacerbation with acute on chronic 

diastolic dysfunction, ejection fraction 50% to 55% with acute hypoxic 

respiratory failure, present on admission.  

2. Recent surgery for ovarian cancer and ascites, possible 

intra-abdominal cavity air from elsewhere with negative findings 

recently.  

3. History of recent urinary tract infection with sepsis. 

4. History of recent acute renal failure.

5. Hypoalbuminemia with mild to moderate protein calorie malnutrition. 

6. History of ovarian cancer. 

7. History of gastroesophageal reflux disease.

8. Hypertension, essential. 

9. Bilateral pleural effusions. 

10. History of gastric ulcer.

11. History of degenerative joint disease. 

12. History of shingles.

13. History of inguinal hernia repair.

14. Anxiety, not otherwise specified. 

15. Anemia, normocytic anemia of chronic disease and secondary to 

malignancy.  

16. Hypomagnesemia. 

17. FULL CODE.



DISCHARGE DISPOSITION:   Patient will be discharged in a stable 

condition with guarded prognosis.  



Total time taken 35 minutes. 



HISTORY OF PRESENT ILLNESS: This 76-year-old woman with a past medical 

history of multiple medical problems as mentioned earlier being 

followed by Dr. Lay in the outpatient setting, had CHF acute 

exacerbation, treated with diuretics. Patient improved significantly. 

The patient also had multiple surgeries as mentioned in the past.  



On exam, vitals are stable. 

CARDIOVASCULAR SYSTEM:  S1, S2, muffled. 

RESPIRATORY:  A few rhonchi. . 

Abdomen is soft.

LEGS:  Bilateral leg edema.

NERVOUS SYSTEM:  No focal deficits.



DISCHARGE ADVICE:

1. Diet is cardiac.

2. Activity limited until followup. 

3. Follow up with Dr. Lay in 2 to 3 days.

4. Follow up with Dr. Pena in 2 weeks. 



The cardiologist recommended medications are: 

1. Xanax 0.5 p.o. b.i.d. p.r.n. 

2. Augmentin 1 p.o. b.i.d. for 5 days. 

3. Vitamin C 500 mg p.o. daily. 

4. Ecotrin 81 mg daily. 

5. Lipitor 10 mg q.h.s. 

6. Calcium 1200 mg p.o. daily. 

7. Vitamin D3 two thousand daily. 

8. Lovenox 40 mg subQ daily. 

9. Lexapro 10 mg q.h.s. 

10. Diflucan 200 mg daily as before. 

11. Folic acid 1 mg p.o. daily. 

12. Lasix 40 mg p.o. daily. 

13. Norco 10 mg q.4 p.r.n. 

14. Lopressor 25 mg p.o. b.i.d. 

15. Multivitamin 1 p.o. daily. 

16. Nystatin 5000 p.o. q.i.d. 

17. K-Dur 10 mEq p.o. daily. 

18. Thiamine 100 mg p.o. daily. 

 

Once again, the patient will be discharged in a stable condition with 

guarded prognosis.

## 2017-04-22 ENCOUNTER — HOSPITAL ENCOUNTER (EMERGENCY)
Dept: HOSPITAL 47 - EC | Age: 77
Discharge: HOME | End: 2017-04-22
Payer: MEDICARE

## 2017-04-22 VITALS — DIASTOLIC BLOOD PRESSURE: 61 MMHG | TEMPERATURE: 99.2 F | SYSTOLIC BLOOD PRESSURE: 104 MMHG | HEART RATE: 70 BPM

## 2017-04-22 VITALS — RESPIRATION RATE: 18 BRPM

## 2017-04-22 DIAGNOSIS — T81.31XA: Primary | ICD-10-CM

## 2017-04-22 DIAGNOSIS — F41.9: ICD-10-CM

## 2017-04-22 DIAGNOSIS — Z79.899: ICD-10-CM
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
## 2017-04-22 DIAGNOSIS — Z79.01: ICD-10-CM

## 2017-04-22 DIAGNOSIS — L03.311: ICD-10-CM

## 2017-04-22 LAB
ALP SERPL-CCNC: 88 U/L (ref 38–126)
ALT SERPL-CCNC: 32 U/L (ref 9–52)
ANION GAP SERPL CALC-SCNC: 9 MMOL/L
AST SERPL-CCNC: 30 U/L (ref 14–36)
BASOPHILS # BLD AUTO: 0.1 K/UL (ref 0–0.2)
BASOPHILS NFR BLD AUTO: 1 %
BUN SERPL-SCNC: 15 MG/DL (ref 7–17)
CALCIUM SPEC-MCNC: 8.9 MG/DL (ref 8.4–10.2)
CH: 29
CHCM: 32.8
CHLORIDE SERPL-SCNC: 96 MMOL/L (ref 98–107)
CO2 SERPL-SCNC: 29 MMOL/L (ref 22–30)
EOSINOPHIL # BLD AUTO: 0.1 K/UL (ref 0–0.7)
EOSINOPHIL NFR BLD AUTO: 1 %
ERYTHROCYTE [DISTWIDTH] IN BLOOD BY AUTOMATED COUNT: 3.77 M/UL (ref 3.8–5.4)
ERYTHROCYTE [DISTWIDTH] IN BLOOD: 14 % (ref 11.5–15.5)
GLUCOSE SERPL-MCNC: 129 MG/DL (ref 74–99)
HCT VFR BLD AUTO: 33.4 % (ref 34–46)
HDW: 2.82
HGB BLD-MCNC: 11 GM/DL (ref 11.4–16)
LUC NFR BLD AUTO: 2 %
LYMPHOCYTES # SPEC AUTO: 1.8 K/UL (ref 1–4.8)
LYMPHOCYTES NFR SPEC AUTO: 15 %
MCH RBC QN AUTO: 29.2 PG (ref 25–35)
MCHC RBC AUTO-ENTMCNC: 33 G/DL (ref 31–37)
MCV RBC AUTO: 88.6 FL (ref 80–100)
MONOCYTES # BLD AUTO: 1 K/UL (ref 0–1)
MONOCYTES NFR BLD AUTO: 9 %
NEUTROPHILS # BLD AUTO: 8.2 K/UL (ref 1.3–7.7)
NEUTROPHILS NFR BLD AUTO: 72 %
NON-AFRICAN AMERICAN GFR(MDRD): >60
POTASSIUM SERPL-SCNC: 4.5 MMOL/L (ref 3.5–5.1)
PROT SERPL-MCNC: 6.7 G/DL (ref 6.3–8.2)
SODIUM SERPL-SCNC: 134 MMOL/L (ref 137–145)
WBC # BLD AUTO: 0.26 10*3/UL
WBC # BLD AUTO: 11.4 K/UL (ref 3.8–10.6)
WBC (PEROX): 11.88

## 2017-04-22 PROCEDURE — 96365 THER/PROPH/DIAG IV INF INIT: CPT

## 2017-04-22 PROCEDURE — 85025 COMPLETE CBC W/AUTO DIFF WBC: CPT

## 2017-04-22 PROCEDURE — 36415 COLL VENOUS BLD VENIPUNCTURE: CPT

## 2017-04-22 PROCEDURE — 99284 EMERGENCY DEPT VISIT MOD MDM: CPT

## 2017-04-22 PROCEDURE — 87040 BLOOD CULTURE FOR BACTERIA: CPT

## 2017-04-22 PROCEDURE — 80053 COMPREHEN METABOLIC PANEL: CPT

## 2017-04-22 PROCEDURE — 74177 CT ABD & PELVIS W/CONTRAST: CPT

## 2017-04-22 NOTE — CT
EXAMINATION TYPE: CT abdomen pelvis w con

 

DATE OF EXAM: 4/22/2017 12:19 PM

 

COMPARISON: 3/21/2017

 

INDICATION: Recent abdominal surgery, not healing, possible infection

 

DLP: 420.7 mGycm, Automated exposure control for dose reduction was used.

 

CONTRAST:  100 mL of Omnipaque 300. 

                        Study performed without Oral Contrast

 

TECHNIQUE: Axial images were obtained from above the diaphragm to the pubic rami in the axial plane a
t 5 mm thick sections.  Reconstructed images are reviewed on the computer in the coronal plane.  

 

FINDINGS:

 

Limited CT sections are obtained the lung bases.  The lung bases are clear.  There is a moderate size
 hiatal hernia present.

 

CT ABDOMEN: There may be some very minimal ascites adjacent to the liver.

 

There is an incision in the midline. This contains air. Findings can be compatible with an open wound
. Underlying abscess is not identified. There is some mild thickening along the incision site. Findin
gs are changed from 3/21/2017. Additionally, there is some subcutaneous air within the left anterior 
hemipelvis. This was present previously.

 

Liver: Normal

 

Spleen: Normal

 

Pancreas: Normal

 

Adrenal glands: The adrenal glands are normal.

 

Gallbladder: Normal  

 

Kidneys: No masses are evident. No hydronephrosis is present.   No cysts are present.  Delayed images
 were obtained through the kidneys, which remain unremarkable.

 

Aorta: Normal

 

Inferior vena cava: Normal.

 

CT PELVIS: 

There is some prominence of small bowel loops within the pelvis which are fluid-filled. Air and fecal
 debris is within the colon. No obstruction is identified. Postsurgical changes are within the mid tr
ansverse colon region. There may have been a prior right hemicolectomy.

 

Appendix: Not identified

 

Urinary bladder: Normal as visualized. This is partially decompressed. 

 

Genitourinary structures: Uterus appears present.

 

Osseous structures: No suspicious lytic or sclerotic lesions. Sacroiliac joint degenerative changes a
re present. There is pars defect at L5. Degenerative disc changes are within the lower lumbar spine

 

IMPRESSIONS:

1.  Phlegmon and open wound in the midline incision. Underlying abscess is not identified. 

2.  Subcutaneous air left hemipelvis.

3. Consider postsurgical ileus within small bowel within the pelvis. Obstruction is not identified.

4. Some minimal ascites may be adjacent to the liver.

## 2017-04-22 NOTE — ED
General Adult HPI





- General


Source: patient, RN notes reviewed


Mode of arrival: ambulatory


Limitations: no limitations





<Kyle Verma - Last Filed: 17 14:36>





<Hood Cueva - Last Filed: 17 17:40>





- General


Chief complaint: Recheck/Abnormal Lab/Rx


Stated complaint: infection


Time Seen by Provider: 17 09:47





- History of Present Illness


Initial comments: 





Patient 77-year-old female status post abdominal surgery 3 weeks, who presents 

emergency room today with a chief complaint of drainage that began 

approximately 1 AM this morning.  She does admit that she woke up and noticed 

that her nightgown was wet.  She does admit that it was a red tinged drainage.  

She admits that she's noticed some redness to the skin that started just 

yesterday.  She does admit to some local tenderness.  She denies any other 

complaints or symptoms. Patient denies any recent fever, chills, shortness of 

breath, chest pain, back pain, nausea or vomiting, numbness or tingling, 

dysuria or hematuria, constipation or diarrhea, headaches or visual changes, or 

any other complaints. (Kyle Verma)





- Related Data


 Home Medications











 Medication  Instructions  Recorded  Confirmed


 


Escitalopram [Lexapro] 10 mg PO HS 05/05/15 04/22/17


 


Cholecalciferol [Vitamin D3] 2,000 unit PO DAILY 16


 


ALPRAZolam [Xanax] 0.25 mg PO BID PRN 17


 


Enoxaparin [Lovenox] 40 mg SQ DAILY 17


 


Multivits-Min/Iron/FA/Lutein 1 tab PO DAILY 17





[Centrum Silver Women Tablet]   


 


Ascorbic Acid [Vitamin C] 500 mg PO DAILY 17


 


Calcium Carbonate [Calcium] 1,200 mg PO DAILY 17


 


HYDROcodone/APAP 10-325MG [Norco 1 tab PO Q4HR PRN 17





]   








 Previous Rx's











 Medication  Instructions  Recorded


 


Aspirin 81 mg PO DAILY #30 chew 04/10/17


 


Atorvastatin [Lipitor] 10 mg PO HS #30 tab 04/10/17


 


Folic Acid 1 mg PO DAILY@1200 #30 tab 04/10/17


 


Furosemide [Lasix] 40 mg PO DAILY #30 tab 04/10/17


 


Metoprolol Tartrate [Lopressor] 25 mg PO BID #60 tab 04/10/17


 


Nystatin 100,000 Unit/ml Susp 500,000 unit PO QID #20 cup 04/10/17





[Mycostatin Oral Susp]  


 


Potassium Chloride ER [K-Dur 20] 20 meq PO DAILY #30 tab.er.prt 04/10/17


 


Thiamine [Vitamin B-1] 100 mg PO DAILY@1200 #30 tab 04/10/17


 


Clindamycin HCl [Cleocin] 300 mg PO Q6HR 10 Days 17











 Allergies











Allergy/AdvReac Type Severity Reaction Status Date / Time


 


No Known Allergies Allergy   Verified 17 12:22














Review of Systems


ROS Other: All systems not noted in ROS Statement are negative.





<Kyle Verma - Last Filed: 17 14:36>


ROS Other: All systems not noted in ROS Statement are negative.





<Hood Cueva - Last Filed: 17 17:40>


ROS Statement: 


Those systems with pertinent positive or pertinent negative responses have been 

documented in the HPI.








Past Medical History


Past Medical History: Cancer, Heart Failure, COPD, GERD/Reflux, Hypertension


Additional Past Medical History / Comment(s): Ovarian cancer status post total 

abdominal hysterectomy and bilateral salpingo-oophorectomy and status post 

systemic chemotherapy, colon cancer status post colectomy, peptic ulcer disease

, osteoporosis, moderate aortic regurgitation with mild aortic stenosis, mild 

mitral regurgitation and secondary pulmonary hypertension as evident on 

echocardiogram, chronic anxiety, shingles, osteoarthritis


History of Any Multi-Drug Resistant Organisms: None Reported


Past Surgical History: Hernia Repair, Hysterectomy


Additional Past Surgical History / Comment(s): ELVIA and BSO, colectomy, 

abdominal hernia repair


Past Anesthesia/Blood Transfusion Reactions: No Reported Reaction


Additional Past Anesthesia/Blood Transfusion Reaction / Comment(s): Pt has 

never recieved blood.


Past Psychological History: Anxiety


Additional Psychological History / Comment(s): Pt lives alone in a single story 

home that has 1 step to get into.  She has a very supportive family and good 

friends and neighbors.  She is normally active and still works at [employer]. 

since recent sx was set up with [facility]- home care had first visist 

today.. has no hospital equipment.


Smoking Status: Never smoker


Past Alcohol Use History: None Reported


Past Drug Use History: None Reported





- Past Family History


  ** Father


Family Medical History: Cancer, Congestive Heart Failure (CHF)


Additional Family Medical History / Comment(s): Father had rheumatic fever as a 

child.  He  in his 60s.





  ** Mother


Family Medical History: Congestive Heart Failure (CHF)





<Kyle Verma - Last Filed: 17 14:36>





General Exam


Limitations: no limitations





<Kyle Verma - Last Filed: 17 14:36>





<Hood Cueva - Last Filed: 17 17:40>





- General Exam Comments


Initial Comments: 





General:  The patient is awake and alert, in no distress, and does not appear 

acutely ill. 


Eye:  Pupils are equal, round and reactive to light, extra-ocular movements are 

intact.  No nystagmus.  There is normal conjunctiva bilaterally.  No signs of 

icterus.  


Ears, nose, mouth and throat:  There are moist mucous membranes and no oral 

lesions. 


Neck:  The neck is supple, there is no tenderness or JVD.  


Cardiovascular:  There is a regular rate and rhythm. No murmur, rub or gallop 

is appreciated.


Respiratory:  Lungs are clear to auscultation, respirations are non-labored, 

breath sounds are equal.  No wheezes, stridor, rales, or rhonchi.


Gastrointestinal:  Patient does have surgical incision running midline of the 

abdomen.  There is an area of local redness middle of the abdomen stretching 

out from the surgical incision.  The area is warm.  There is a small dehiscence 

of the wound in this area which does have some serous sanguinous type drainage.

  Patient does have some mild tenderness locally.


Musculoskeletal:  Normal ROM, no tenderness.  Strength 5/5. Sensation intact. 

Pulses equal bilaterally 2+.  


Neurological:  A&O x 3. CN II-XII intact, There are no obvious motor or sensory 

deficits. Coordination appears grossly intact. Speech is normal.


Skin:  Patient does have cellulitis of the abdomen with redness and increased 

warmth on palpation. 


Psychiatric:  Cooperative, appropriate mood & affect, normal judgment.   (Kyle Verma)





Course





<Kyle Verma - Last Filed: 17 14:36>





<Hood Cueva - Last Filed: 17 17:40>





 Vital Signs











  17





  09:36 13:38 15:28


 


Temperature 98.1 F 99.0 F 99.2 F


 


Pulse Rate 77 69 70


 


Respiratory 18 18 18





Rate   


 


Blood Pressure 126/59 108/59 104/61


 


O2 Sat by Pulse 98 98 100





Oximetry   














- Reevaluation(s)


Reevaluation #1: 





17 10:02


Patient was seen here along with attending physician Dr. Cueva.  Patient does 

appear to have a cellulitis of the abdomen.  She admits that this started 

yesterday but does have drainage that started late last night.  Drainage is 

serosanguineous appears to be from a seroma small dehiscence of the surgical 

incision.  CT of the abdomen and pelvis will be obtained to rule out any other 

infection.





 (Kyle Verma)


Reevaluation #2: 





17 17:39


I did personally do a face-to-face examination the patient and did discuss the 

findings with her and her family.  He was localized erythema about the wound 

site evidence of wound dehiscence was a seroma and drainage.  Minimal 

tenderness to palpation no abdominal tenderness otherwise.  I did review the 

labs and x-rays as well as the CAT scan.  I do agree with the assessment and 

plan. (Hood Cueva)





Medical Decision Making





- Lab Data


Result diagrams: 


 17 10:35





 17 10:35





<Kyle Verma - Last Filed: 17 14:36>





- Lab Data


Result diagrams: 


 17 10:35





 17 10:35





<Hood Cueva - Last Filed: 17 17:40>





- Medical Decision Making





His labs reviewed that showed 11,000 white count.  Patient's lactic acid was 

2.6.  Patient's resting comfortably in the stretcher.  Vitals are stable.  The 

case was discussed with attending physician Dr. Hammad Way.  Patient at bedside.

  Wound culture and blood cultures are pending.  Clinically looks well.  Patient

's CT reviewed. Patient's CT reviewed and shows 1.  Phlegmon an open wound in 

the midline incision.  Underlying abscess is not identified.  2.  Subcutaneous 

air left hemipelvis.  3.  Consider post surgical ileus within the small bowel 

within the pelvis.  Obstruction not identified.  4.  Some minimal ascites may 

be adjacent to the liver.  Results were discussed with attending physician Dr. Cueva.  Case was also discussed with the [facility]'s surgeon on-call for Dr. Eden, Dr. Hall.  CT results were discussed with the patient.  Her Rafael 

recommends wet-to-dry dressings of the wound dehiscence with antibiotics.  

States that the subcutaneous air is felt to be from heparin shots.  States 

advises following up with Dr. Eden Monday morning.  His results were 

discussed with patient and her daughter at bedside.  She will be started on 

antibiotic.  Advised to return to emergency room if any symptoms increase or 

worsen.  Advised follow-up with her surgeon in the next 2 days. (Kyle Verma)





- Lab Data





 Lab Results











  17 Range/Units





  10:35 10:35 


 


WBC  11.4 H   (3.8-10.6)  k/uL


 


RBC  3.77 L   (3.80-5.40)  m/uL


 


Hgb  11.0 L   (11.4-16.0)  gm/dL


 


Hct  33.4 L   (34.0-46.0)  %


 


MCV  88.6   (80.0-100.0)  fL


 


MCH  29.2   (25.0-35.0)  pg


 


MCHC  33.0   (31.0-37.0)  g/dL


 


RDW  14.0   (11.5-15.5)  %


 


Plt Count  243   (150-450)  k/uL


 


Neutrophils %  72   %


 


Lymphocytes %  15   %


 


Monocytes %  9   %


 


Eosinophils %  1   %


 


Basophils %  1   %


 


Neutrophils #  8.2 H   (1.3-7.7)  k/uL


 


Lymphocytes #  1.8   (1.0-4.8)  k/uL


 


Monocytes #  1.0   (0-1.0)  k/uL


 


Eosinophils #  0.1   (0-0.7)  k/uL


 


Basophils #  0.1   (0-0.2)  k/uL


 


Sodium   134 L  (137-145)  mmol/L


 


Potassium   4.5  (3.5-5.1)  mmol/L


 


Chloride   96 L  ()  mmol/L


 


Carbon Dioxide   29  (22-30)  mmol/L


 


Anion Gap   9  mmol/L


 


BUN   15  (7-17)  mg/dL


 


Creatinine   0.85  (0.52-1.04)  mg/dL


 


Est GFR (MDRD) Af Amer   >60  (>60 ml/min/1.73 sqM)  


 


Est GFR (MDRD) Non-Af   >60  (>60 ml/min/1.73 sqM)  


 


Glucose   129 H  (74-99)  mg/dL


 


Calcium   8.9  (8.4-10.2)  mg/dL


 


Total Bilirubin   0.7  (0.2-1.3)  mg/dL


 


AST   30  (14-36)  U/L


 


ALT   32  (9-52)  U/L


 


Alkaline Phosphatase   88  ()  U/L


 


Total Protein   6.7  (6.3-8.2)  g/dL


 


Albumin   3.2 L  (3.5-5.0)  g/dL














Disposition


Time of Disposition: 14:39





<Kyle Verma - Last Filed: 17 14:36>





<Hood Cueva - Last Filed: 17 17:40>


Clinical Impression: 


 Wound dehiscence, surgical, Cellulitis





Disposition: HOME SELF-CARE


Condition: Good


Instructions:  Cellulitis (ED)


Additional Instructions: 


Please follow-up with surgeon as discussed over the next 2 days.  Please use 

antibiotic as prescribed.  Please return to emergency room if there is any 

increase or worsening of redness swelling or pain or for any other concerns.


Prescriptions: 


Clindamycin HCl [Cleocin] 300 mg PO Q6HR 10 Days


Referrals: 


PINA Lay III, MD [Primary Care Provider] - 1-2 days

## 2017-05-11 ENCOUNTER — HOSPITAL ENCOUNTER (OUTPATIENT)
Dept: HOSPITAL 47 - RADECHMAIN | Age: 77
Discharge: HOME | End: 2017-05-11
Payer: MEDICARE

## 2017-05-11 DIAGNOSIS — Z53.9: Primary | ICD-10-CM

## 2017-09-15 ENCOUNTER — HOSPITAL ENCOUNTER (OUTPATIENT)
Dept: HOSPITAL 47 - RADCTMAIN | Age: 77
Discharge: HOME | End: 2017-09-15
Payer: MEDICARE

## 2017-09-15 DIAGNOSIS — C56.9: ICD-10-CM

## 2017-09-15 DIAGNOSIS — R59.0: Primary | ICD-10-CM

## 2017-09-15 LAB
BUN SERPL-SCNC: 16 MG/DL (ref 7–17)
NON-AFRICAN AMERICAN GFR(MDRD): >60

## 2017-09-15 PROCEDURE — 82565 ASSAY OF CREATININE: CPT

## 2017-09-15 PROCEDURE — 84520 ASSAY OF UREA NITROGEN: CPT

## 2017-09-15 PROCEDURE — 74177 CT ABD & PELVIS W/CONTRAST: CPT

## 2017-09-15 PROCEDURE — 71260 CT THORAX DX C+: CPT

## 2017-09-15 PROCEDURE — 36415 COLL VENOUS BLD VENIPUNCTURE: CPT

## 2017-09-15 NOTE — CT
EXAMINATION TYPE: CT ChestAbdPelvis w con

 

DATE OF EXAM: 9/15/2017

 

COMPARISON: CT abdomen pelvis dated 4/22/2017 and 2/21/2017

 

HISTORY: Ovarian Cancer. Hysterectomy.

 

CT DLP: 602.30 mGycm. Automated Exposure Control for Dose Reduction was Utilized.

 

 

CONTRAST: 

CT scan of the thorax, abdomen and pelvis is performed with IV Contrast, patient injected with 100 ml
 mL of Omnipaque 300.

 

FINDINGS:

 

LUNGS: 3 mm pulmonary nodule seen within the right middle lobe of series 4 image 36. This is solid an
d noncalcified. Minimal subsegmental bibasilar dependent atelectasis is noted. Respiratory motion ness
its evaluation of the lungs for identification of subcentimeter pulmonary nodules. Multifocal left pl
eural plaquing is seen posteriorly along the mid and upper lung. The lungs are grossly clear, there i
s no concerning parenchymal mass or nodule identified.   There is no pleural effusion or pneumothorax
 seen.  The tracheobronchial tree is patent.

 

MEDIASTINUM: There are no greater than 1 cm hilar or mediastinal lymph nodes.   No pericardial effusi
on is seen.  

 

OTHER: Dystrophic calcifications are seen within the breast parenchyma.

 

LIVER/GB: No focal hepatic lesion is seen. No liver parenchymal invasion or subserosal implant is rebecca
ntified. No pseudomyxoma peritonei is seen. Gallbladder is unremarkable.

 

PANCREAS: Pancreatic parenchymal calcifications are present indicative of chronic pancreatitis with n
o current peripancreatic fat stranding or ductal dilatation.

 

SPLEEN: Single punctate calcified granulomas seen within the splenic parenchyma..

 

ADRENALS: No significant abnormality is seen.

 

KIDNEYS: No significant abnormality is seen.

 

BOWEL: Partial intrathoracic stomach is noted. Ventral abdominal hernia is seen superimposed upon zeke
stases recti containing a single loop of large bowel, which is nondilated without bowel wall thickeni
ng or surrounding expiratory fat stranding. Hernia neck is wide measuring 2.1 cm. This is at the leve
l of the surgical incision noted on the prior exam. Multiple sigmoid diverticula are present without 
pericolonic fat stranding.

 

GENITAL ORGANS: Uterus is surgically absent and both ovaries are presumed to be surgically absent.

 

LYMPH NODES: Periaortic lymphadenopathy is appreciated on coronal series 7 image 35 with 2 lymph node
s measuring 1.1 cm in short axis each. These are also seen on series 3 image 65 and 66. Additional 1.
0 cm short axis right periaortic lymph node situated between the aorta and inferior vena cava seen on
 series 3 image 69. These appear more pronounced on the prior exam. 5.6 mm left external iliac pelvic
 lymph node is similar to the prior. Smaller nonenlarged right external iliac lymph node is seen on t
he same image series 3 image 89.

 

OSSEOUS STRUCTURES: Multilevel degenerative changes are seen of the thoracic and lumbar spine with gr
nando 2 anterolisthesis of L4 on L5, unchanged from the prior. Pars interarticularis defects at L5 are 
again appreciated.

 

OTHER: The previously seen phlegmon within the midline wound incision has resolved in the interim wit
h no focal fluid collection or abscess. Seen.

 

IMPRESSION:

1. Enlarging periaortic adenopathy, which could be reactive although metastasis is also possible and 
therefore surveillance is recommended.  

2. No evidence of visceral metastasis, hepatic subserosal implants, liver parenchymal invasion, or gr
oss evidence of mesenteric implants in this patient with a history of ovarian cancer.

3. Prominent but nonenlarged left pelvic sidewall (external iliac chain) lymph node.

4. Resolution of the previously seen phlegmonous changes at the surgical incision site with new wide 
neck bowel containing hernia defect.

5. Solitary 3 mm solid right pulmonary nodule, unlikely to be metastatic although surveillance is rec
ommended.

## 2017-10-23 ENCOUNTER — HOSPITAL ENCOUNTER (OUTPATIENT)
Dept: HOSPITAL 47 - LABWHC1 | Age: 77
Discharge: HOME | End: 2017-10-23
Payer: MEDICARE

## 2017-10-23 DIAGNOSIS — C56.1: Primary | ICD-10-CM

## 2017-10-23 PROCEDURE — 86304 IMMUNOASSAY TUMOR CA 125: CPT

## 2017-10-23 PROCEDURE — 36415 COLL VENOUS BLD VENIPUNCTURE: CPT

## 2018-02-05 ENCOUNTER — HOSPITAL ENCOUNTER (OUTPATIENT)
Dept: HOSPITAL 47 - RADCTMAIN | Age: 78
Discharge: HOME | End: 2018-02-05
Payer: MEDICARE

## 2018-02-05 DIAGNOSIS — K22.9: ICD-10-CM

## 2018-02-05 DIAGNOSIS — C56.9: Primary | ICD-10-CM

## 2018-02-05 DIAGNOSIS — R59.0: ICD-10-CM

## 2018-02-05 DIAGNOSIS — K44.9: ICD-10-CM

## 2018-02-05 LAB — BUN SERPL-SCNC: 21 MG/DL (ref 7–17)

## 2018-02-05 PROCEDURE — 82565 ASSAY OF CREATININE: CPT

## 2018-02-05 PROCEDURE — 36415 COLL VENOUS BLD VENIPUNCTURE: CPT

## 2018-02-05 PROCEDURE — 84520 ASSAY OF UREA NITROGEN: CPT

## 2018-02-05 PROCEDURE — 74177 CT ABD & PELVIS W/CONTRAST: CPT

## 2018-02-05 PROCEDURE — 71260 CT THORAX DX C+: CPT

## 2018-02-05 NOTE — CT
EXAMINATION TYPE: CT ChestAbdPelvis w con

 

DATE OF EXAM: 2/5/2018

 

COMPARISON: 9/15/2017

 

HISTORY: Abdominal pain, constipation, and ovarian CA.

 

CT DLP: 552.8 mGycm

 

CONTRAST: 

CT scan of the chest, abdomen and pelvis is performed with Oral Contrast and with IV Contrast, patien
t injected with 100 mL of Omnipaque 300.

 

CT  Chest:

LUNGS: The lungs are clear and free of infiltrate or atelectasis.  No pulmonary nodule or mass is det
ected.  No pleural effusion or CT evidence of interstitial lung disease.

 

MEDIASTINUM:  Thoracic aorta is of normal caliber.  The heart is not enlarged.  No evidence for media
stinal mass or adenopathy. There is a large fixed hiatal hernia with esophageal wall thickening which
 may reflect underlying esophagitis. Correlate clinically.

 

HILAR STRUCTURES: No evidence for mass.  No hilar adenopathy is appreciated.

 

OTHER: No significant abnormality.

 

CONTRAST CT ABDOMEN AND PELVIS FINDINGS: 

 

LIVER/GB:   No calcified gallstones.  No space occupying hepatic lesion. Biliary tree is of normal ca
liber. 

 

PANCREAS:  Atrophic changes of the pancreas with calcifications noted suggesting chronic pancreatitis
. No inflammation.  No distinct mass. 

 

SPLEEN:  No splenic enlargement.  No lesion seen. 

 

ADRENALS:  No nodule.  No thickening. 

 

KIDNEYS/BLADDER:  No hydronephrosis.  No nephrolithiasis.  No disctinct renal mass. 

 

BOWEL: Normal appendix.  Normal bowel caliber.  No inflammation. 

 

GENITAL ORGANS: Hysterectomy and oophorectomy change. No evidence for recurrent mass within the pelvi
s.. 

 

LYMPH NODES: There is aortoenteric lateral adenopathy measuring 2.8 x 2.2 cm which appears to be more
 conglomerate than on prior examination and appears to have increased slightly. Left para-aortic lymp
h node measures 1.2 cm versus 8 mm previously. Multiple additional left paratracheal lymph nodes iden
tified slightly increased from prior examination. No evidence for pelvic or inguinal adenopathy. 

 

AORTA: No significant abnormality. 

 

OSSEOUS STRUCTURES: Multilevel degenerative disc disease with mild loss of height at several levels. 
Grade 1 anterolisthesis L4 and L5.

 

OTHER: Enlarging hernia at the level of the umbilicus which contains a short segment of small bowel a
s well as to the right of midline which contains one or 2 segments of small bowel. No signs of obstru
ction or incarceration. 

 

IMPRESSION: 

1. Priestly noted nodule right middle lobe is not confirmed on today's study.

2. Large fixed hiatal hernia with esophageal wall thickening may reflect superimposed esophagitis. Co
rrelate clinically.

3. Increasing retroperitoneal adenopathy as discussed above. 4 hysterectomy and oophorectomy change w
ithout tumor recurrence.

## 2018-03-26 ENCOUNTER — HOSPITAL ENCOUNTER (OUTPATIENT)
Dept: HOSPITAL 47 - RADUSWWP | Age: 78
Discharge: HOME | End: 2018-03-26
Payer: MEDICARE

## 2018-03-26 DIAGNOSIS — R22.41: Primary | ICD-10-CM

## 2018-03-26 DIAGNOSIS — M79.661: ICD-10-CM

## 2018-03-26 NOTE — US
EXAMINATION TYPE: US venous doppler duplex LE RT

 

DATE OF EXAM: 3/26/2018 12:56 PM

 

COMPARISON: NONE

 

CLINICAL HISTORY: R22.41 Swelling right lower, Pain r lower M79.661.

 

SIDE PERFORMED: Right  

 

TECHNIQUE:  The lower extremity deep venous system is examined utilizing real time linear array sonog
phillip with graded compression, doppler sonography and color-flow sonography.

 

VESSELS IMAGED:

External Iliac Vein (EIV)

Common Femoral Vein

Deep Femoral Vein

Greater Saphenous Vein *

Femoral Vein

Popliteal Vein

Small Saphenous Vein *

Proximal Calf Veins

(* superficial vessels)

 

 

 

Right Leg:  Negative for DVT

 

Images are all presumed mislabeled left.

 

Grayscale, color doppler, spectral doppler imaging performed of the deep veins of the right lower ext
remity.  There is normal flow, compressibility, vascular waveforms.

 

IMPRESSION: No ultrasound evidence for acute DVT in the right lower extremity.

## 2018-04-09 ENCOUNTER — HOSPITAL ENCOUNTER (EMERGENCY)
Dept: HOSPITAL 47 - EC | Age: 78
Discharge: HOME | End: 2018-04-09
Payer: MEDICARE

## 2018-04-09 VITALS — DIASTOLIC BLOOD PRESSURE: 56 MMHG | SYSTOLIC BLOOD PRESSURE: 119 MMHG | HEART RATE: 73 BPM | TEMPERATURE: 97.6 F

## 2018-04-09 VITALS — RESPIRATION RATE: 18 BRPM

## 2018-04-09 DIAGNOSIS — Z79.899: ICD-10-CM

## 2018-04-09 DIAGNOSIS — F41.9: ICD-10-CM

## 2018-04-09 DIAGNOSIS — Z88.6: ICD-10-CM

## 2018-04-09 DIAGNOSIS — E83.42: ICD-10-CM

## 2018-04-09 DIAGNOSIS — M46.1: ICD-10-CM

## 2018-04-09 DIAGNOSIS — M25.512: ICD-10-CM

## 2018-04-09 DIAGNOSIS — Z90.710: ICD-10-CM

## 2018-04-09 DIAGNOSIS — C56.9: ICD-10-CM
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
## 2018-04-09 DIAGNOSIS — D64.9: ICD-10-CM
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
## 2018-04-09 DIAGNOSIS — E86.0: ICD-10-CM

## 2018-04-09 DIAGNOSIS — Z90.722: ICD-10-CM

## 2018-04-09 DIAGNOSIS — Z92.21: ICD-10-CM

## 2018-04-09 DIAGNOSIS — S39.012A: Primary | ICD-10-CM

## 2018-04-09 LAB
ALBUMIN SERPL-MCNC: 3.6 G/DL (ref 3.5–5)
ALP SERPL-CCNC: 74 U/L (ref 38–126)
ALT SERPL-CCNC: 37 U/L (ref 9–52)
ANION GAP SERPL CALC-SCNC: 13 MMOL/L
AST SERPL-CCNC: 46 U/L (ref 14–36)
BASOPHILS # BLD AUTO: 0 K/UL (ref 0–0.2)
BASOPHILS NFR BLD AUTO: 0 %
BUN SERPL-SCNC: 36 MG/DL (ref 7–17)
CALCIUM SPEC-MCNC: 9.2 MG/DL (ref 8.4–10.2)
CHLORIDE SERPL-SCNC: 102 MMOL/L (ref 98–107)
CO2 SERPL-SCNC: 25 MMOL/L (ref 22–30)
EOSINOPHIL # BLD AUTO: 0.1 K/UL (ref 0–0.7)
EOSINOPHIL NFR BLD AUTO: 1 %
ERYTHROCYTE [DISTWIDTH] IN BLOOD BY AUTOMATED COUNT: 3.25 M/UL (ref 3.8–5.4)
ERYTHROCYTE [DISTWIDTH] IN BLOOD: 15.9 % (ref 11.5–15.5)
GLUCOSE SERPL-MCNC: 119 MG/DL (ref 74–99)
HCT VFR BLD AUTO: 30.4 % (ref 34–46)
HGB BLD-MCNC: 9.6 GM/DL (ref 11.4–16)
LYMPHOCYTES # SPEC AUTO: 0.5 K/UL (ref 1–4.8)
LYMPHOCYTES NFR SPEC AUTO: 5 %
MAGNESIUM SPEC-SCNC: 1.3 MG/DL (ref 1.6–2.3)
MCH RBC QN AUTO: 29.7 PG (ref 25–35)
MCHC RBC AUTO-ENTMCNC: 31.7 G/DL (ref 31–37)
MCV RBC AUTO: 93.6 FL (ref 80–100)
MONOCYTES # BLD AUTO: 0.7 K/UL (ref 0–1)
MONOCYTES NFR BLD AUTO: 7 %
NEUTROPHILS # BLD AUTO: 7.7 K/UL (ref 1.3–7.7)
NEUTROPHILS NFR BLD AUTO: 85 %
PLATELET # BLD AUTO: 147 K/UL (ref 150–450)
POTASSIUM SERPL-SCNC: 4.2 MMOL/L (ref 3.5–5.1)
PROT SERPL-MCNC: 6.6 G/DL (ref 6.3–8.2)
SODIUM SERPL-SCNC: 140 MMOL/L (ref 137–145)
WBC # BLD AUTO: 9 K/UL (ref 3.8–10.6)

## 2018-04-09 PROCEDURE — 85025 COMPLETE CBC W/AUTO DIFF WBC: CPT

## 2018-04-09 PROCEDURE — 36415 COLL VENOUS BLD VENIPUNCTURE: CPT

## 2018-04-09 PROCEDURE — 72170 X-RAY EXAM OF PELVIS: CPT

## 2018-04-09 PROCEDURE — 99283 EMERGENCY DEPT VISIT LOW MDM: CPT

## 2018-04-09 PROCEDURE — 96365 THER/PROPH/DIAG IV INF INIT: CPT

## 2018-04-09 PROCEDURE — 96375 TX/PRO/DX INJ NEW DRUG ADDON: CPT

## 2018-04-09 PROCEDURE — 72110 X-RAY EXAM L-2 SPINE 4/>VWS: CPT

## 2018-04-09 PROCEDURE — 83735 ASSAY OF MAGNESIUM: CPT

## 2018-04-09 PROCEDURE — 80053 COMPREHEN METABOLIC PANEL: CPT

## 2018-04-09 NOTE — XR
EXAM TYPE: LUMBAR SPINE X RAY SERIES

 

COMPARISON: NONE

 

HISTORY: Pain

 

TECHNIQUE: 4 views are submitted.

 

FINDINGS:

Alignment is anatomic.  The pedicles are intact.  The transverse processes are intact.  Diffuse osteo
penia and multilevel hypertrophic and degenerative disc disease noted. Facet arthropathy particularly
 marked at L4-5 and L5-S1 with grade 1 anterolisthesis L4 on L5.

 

IMPRESSION:

1. Severe degenerative disc disease at all levels with grade 1 anterolisthesis L4 on L5.

2. Mild anterior wedge deformity T11 is stable from chest x-ray 4/7/2017

## 2018-04-09 NOTE — XR
EXAMINATION TYPE: XR pelvis AP view

 

DATE OF EXAM: 4/9/2018

 

CLINICAL HISTORY:

 

TECHNIQUE: A single AP view of the pelvis is obtained.

 

COMPARISON: None.

 

FINDINGS:  There is no acute fracture/dislocation evident in the pelvis. Surgical clips in the pelvis
 noted. There is arthropathy of both hip joints and hypertrophic change of the greater trochanters.

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 The overlying sof
t tissue appears unremarkable.

 

IMPRESSION:  There is no acute fracture or dislocation in the pelvis.

## 2018-04-09 NOTE — ED
Fall HPI





- General


Chief Complaint: Fall


Stated Complaint: fall


Time Seen by Provider: 18 10:37


Source: patient, RN notes reviewed


Mode of arrival: wheelchair





- History of Present Illness


Initial Comments: 





This is a 77-year-old female history of ovarian cancer chemotherapy today with 

her last chemo 2 weeks ago who states she went to get something in her garage 

one of one step and fell down onto her foot.  She believes she may have struck 

her head against the car but has no head neck or upper back pain.  She states 

she has low back pain he points to her left SI joint area.  He states is rather 

severe.  Sharp in nature she was able ambulate however afterwards.  She has no 

loss of consciousness no loss of function to her upper or lower extremities.  

She states she has chronic left shoulder pain which is nothing new today from 

her usual.  She has no other reported injuries.  She states she was getting a 

platelet checked today she does occasionally have muscular cramps.


MD Complaint: fall





- Related Data


 Home Medications











 Medication  Instructions  Recorded  Confirmed


 


Escitalopram [Lexapro] 10 mg PO HS 05/05/15 04/09/18


 


Cholecalciferol [Vitamin D3] 2,000 unit PO DAILY 16


 


Multivit-Min/Iron/Folic/Lutein 1 tab PO DAILY 17





[Centrum Silver Women Tablet]   


 


Ascorbic Acid [Vitamin C] 500 mg PO DAILY 17


 


Calcium Carbonate [Calcium] 1,200 mg PO DAILY 17


 


HYDROcodone/APAP 10-325MG [Norco 1 tab PO Q4HR PRN 17





]   


 


Ondansetron [Zofran] 4 mg PO Q12HR PRN 18








 Previous Rx's











 Medication  Instructions  Recorded


 


Folic Acid 1 mg PO DAILY@1200 #30 tab 04/10/17


 


Metoprolol Tartrate [Lopressor] 25 mg PO BID #60 tab 04/10/17


 


Thiamine [Vitamin B-1] 100 mg PO DAILY@1200 #30 tab 04/10/17


 


Ibuprofen [Motrin] 600 mg PO Q6HR PRN #20 tab 18











 Allergies











Allergy/AdvReac Type Severity Reaction Status Date / Time


 


aspirin Allergy  Unknown Verified 18 10:44














Review of Systems


ROS Statement: 


Those systems with pertinent positive or pertinent negative responses have been 

documented in the HPI.





ROS Other: All systems not noted in ROS Statement are negative.





Past Medical History


Past Medical History: Cancer, Heart Failure, COPD, GERD/Reflux, Hypertension


Additional Past Medical History / Comment(s): Ovarian cancer status post total 

abdominal hysterectomy and bilateral salpingo-oophorectomy and status post 

systemic chemotherapy, colon cancer status post colectomy, peptic ulcer disease

, osteoporosis, moderate aortic regurgitation with mild aortic stenosis, mild 

mitral regurgitation and secondary pulmonary hypertension as evident on 

echocardiogram, chronic anxiety, shingles, osteoarthritis


History of Any Multi-Drug Resistant Organisms: None Reported


Past Surgical History: Hernia Repair, Hysterectomy


Additional Past Surgical History / Comment(s): ELVIA and BSO, colectomy, 

abdominal hernia repair


Past Anesthesia/Blood Transfusion Reactions: No Reported Reaction


Additional Past Anesthesia/Blood Transfusion Reaction / Comment(s): Pt has 

never recieved blood.


Past Psychological History: Anxiety


Smoking Status: Never smoker


Past Alcohol Use History: None Reported


Past Drug Use History: None Reported





- Past Family History


  ** Father


Family Medical History: Cancer, Congestive Heart Failure (CHF)


Additional Family Medical History / Comment(s): Father had rheumatic fever as a 

child.  He  in his 60s.





  ** Mother


Family Medical History: Congestive Heart Failure (CHF)





General Exam





- General Exam Comments


Initial Comments: 





This is a well-developed well-nourished awake alert oriented 3 female she is a 

Jero Coma Scale of 15


Limitations: no limitations


General appearance: alert, anxious


Head exam: Present: atraumatic, normocephalic, normal inspection


Eye exam: Present: normal appearance, PERRL, EOMI.  Absent: scleral icterus, 

conjunctival injection, periorbital swelling


ENT exam: Present: normal exam, mucous membranes moist


Neck exam: Present: normal inspection.  Absent: tenderness, meningismus, 

lymphadenopathy


Respiratory exam: Present: normal lung sounds bilaterally.  Absent: respiratory 

distress, wheezes, rales, rhonchi, stridor


Cardiovascular Exam: Present: regular rate, normal rhythm, normal heart sounds.

  Absent: systolic murmur, diastolic murmur, rubs, gallop, clicks


GI/Abdominal exam: Present: soft, normal bowel sounds.  Absent: distended, 

tenderness, guarding, rebound, rigid


Rectal exam: Present: deferred


Extremities exam: Present: normal inspection, full ROM, tenderness (Very mild 

tenderness palpation over the lateral right shoulder this is no different than 

her usual pain she states no step-off no crepitation.), normal capillary 

refill.  Absent: pedal edema, joint swelling, calf tenderness


Back exam: Present: normal inspection, full ROM, tenderness, paraspinal 

tenderness, other (Some tenderness over left SI joint palpation some left lower 

lumbar paraspinous muscle tenderness no spinous process tenderness.).  Absent: 

CVA tenderness (R), CVA tenderness (L), muscle spasm, vertebral tenderness


Neurological exam: Present: alert, oriented X3, CN II-XII intact


Psychiatric exam: Present: normal affect, normal mood


Skin exam: Present: warm, dry, intact, normal color.  Absent: rash





Course


 Vital Signs











  18





  10:31 11:56


 


Temperature 97.8 F 97.4 F L


 


Pulse Rate 79 72


 


Respiratory 18 18





Rate  


 


Blood Pressure 153/67 136/72


 


O2 Sat by Pulse 98 95





Oximetry  














- Reevaluation(s)


Reevaluation #1: 





18 13:29


Patient does states she has ALLERGY to aspirin but is able take ibuprofen





Medical Decision Making





- Medical Decision Making





The patient is feeling improved after the medication was given.  She does 

demonstrate evidence of hypomagnesemia dehydration she will get IV fluids and 

IV magnesium and be discharged.  [facility] will be contacted to 

see if she can get her chemotherapy today.  Labs were reviewed with the patient.





- Lab Data


Result diagrams: 


 18 11:17





 18 11:17


 Lab Results











  18 Range/Units





  11:17 11:17 


 


WBC  9.0   (3.8-10.6)  k/uL


 


RBC  3.25 L   (3.80-5.40)  m/uL


 


Hgb  9.6 L   (11.4-16.0)  gm/dL


 


Hct  30.4 L   (34.0-46.0)  %


 


MCV  93.6   (80.0-100.0)  fL


 


MCH  29.7   (25.0-35.0)  pg


 


MCHC  31.7   (31.0-37.0)  g/dL


 


RDW  15.9 H   (11.5-15.5)  %


 


Plt Count  147 L   (150-450)  k/uL


 


Neutrophils %  85   %


 


Lymphocytes %  5   %


 


Monocytes %  7   %


 


Eosinophils %  1   %


 


Basophils %  0   %


 


Neutrophils #  7.7   (1.3-7.7)  k/uL


 


Lymphocytes #  0.5 L   (1.0-4.8)  k/uL


 


Monocytes #  0.7   (0-1.0)  k/uL


 


Eosinophils #  0.1   (0-0.7)  k/uL


 


Basophils #  0.0   (0-0.2)  k/uL


 


Hypochromasia  Slight   


 


Sodium   140  (137-145)  mmol/L


 


Potassium   4.2  (3.5-5.1)  mmol/L


 


Chloride   102  ()  mmol/L


 


Carbon Dioxide   25  (22-30)  mmol/L


 


Anion Gap   13  mmol/L


 


BUN   36 H  (7-17)  mg/dL


 


Creatinine   1.02  (0.52-1.04)  mg/dL


 


Est GFR (CKD-EPI)AfAm   63  (>60 ml/min/1.73 sqM)  


 


Est GFR (CKD-EPI)NonAf   54  (>60 ml/min/1.73 sqM)  


 


Glucose   119 H  (74-99)  mg/dL


 


Calcium   9.2  (8.4-10.2)  mg/dL


 


Magnesium   1.3 L  (1.6-2.3)  mg/dL


 


Total Bilirubin   0.4  (0.2-1.3)  mg/dL


 


AST   46 H  (14-36)  U/L


 


ALT   37  (9-52)  U/L


 


Alkaline Phosphatase   74  ()  U/L


 


Total Protein   6.6  (6.3-8.2)  g/dL


 


Albumin   3.6  (3.5-5.0)  g/dL














- Radiology Data


Radiology results: report reviewed (I did review the imaging and reports no 

acute findings or is evidence of degenerative joint disease.), image reviewed





Disposition


Clinical Impression: 


 Fall, Lumbar strain, Sacroiliitis, Ovarian cancer, Anemia, Hypomagnesemia 

syndrome





Disposition: HOME SELF-CARE


Condition: Good


Instructions:  Fall Prevention for Older Adults (ED), Low Back Strain (ED)


Prescriptions: 


Ibuprofen [Motrin] 600 mg PO Q6HR PRN #20 tab


 PRN Reason: Pain


Referrals: 


PINA Lay III, MD [Primary Care Provider] - 1-2 days

## 2018-05-06 ENCOUNTER — HOSPITAL ENCOUNTER (INPATIENT)
Dept: HOSPITAL 47 - EC | Age: 78
LOS: 2 days | Discharge: HOME | DRG: 187 | End: 2018-05-08
Attending: INTERNAL MEDICINE | Admitting: INTERNAL MEDICINE
Payer: MEDICARE

## 2018-05-06 DIAGNOSIS — J44.9: ICD-10-CM

## 2018-05-06 DIAGNOSIS — K27.9: ICD-10-CM

## 2018-05-06 DIAGNOSIS — M81.0: ICD-10-CM

## 2018-05-06 DIAGNOSIS — Z85.038: ICD-10-CM

## 2018-05-06 DIAGNOSIS — F41.9: ICD-10-CM

## 2018-05-06 DIAGNOSIS — I71.2: ICD-10-CM

## 2018-05-06 DIAGNOSIS — Z88.6: ICD-10-CM

## 2018-05-06 DIAGNOSIS — I27.29: ICD-10-CM

## 2018-05-06 DIAGNOSIS — M19.91: ICD-10-CM

## 2018-05-06 DIAGNOSIS — Z90.710: ICD-10-CM

## 2018-05-06 DIAGNOSIS — T45.1X5A: ICD-10-CM

## 2018-05-06 DIAGNOSIS — D71: ICD-10-CM

## 2018-05-06 DIAGNOSIS — I11.0: ICD-10-CM

## 2018-05-06 DIAGNOSIS — I50.32: ICD-10-CM

## 2018-05-06 DIAGNOSIS — R18.0: ICD-10-CM

## 2018-05-06 DIAGNOSIS — Z92.21: ICD-10-CM

## 2018-05-06 DIAGNOSIS — N17.9: ICD-10-CM

## 2018-05-06 DIAGNOSIS — Z90.79: ICD-10-CM

## 2018-05-06 DIAGNOSIS — K59.00: ICD-10-CM

## 2018-05-06 DIAGNOSIS — Z86.19: ICD-10-CM

## 2018-05-06 DIAGNOSIS — J90: Primary | ICD-10-CM

## 2018-05-06 DIAGNOSIS — K21.9: ICD-10-CM

## 2018-05-06 DIAGNOSIS — Z82.49: ICD-10-CM

## 2018-05-06 DIAGNOSIS — Z90.722: ICD-10-CM

## 2018-05-06 DIAGNOSIS — F32.9: ICD-10-CM

## 2018-05-06 DIAGNOSIS — E86.0: ICD-10-CM

## 2018-05-06 DIAGNOSIS — I08.0: ICD-10-CM

## 2018-05-06 DIAGNOSIS — Z90.49: ICD-10-CM

## 2018-05-06 DIAGNOSIS — C56.9: ICD-10-CM

## 2018-05-06 DIAGNOSIS — D64.9: ICD-10-CM

## 2018-05-06 DIAGNOSIS — Z79.899: ICD-10-CM

## 2018-05-06 LAB
ALBUMIN SERPL-MCNC: 3.4 G/DL (ref 3.5–5)
ALP SERPL-CCNC: 90 U/L (ref 38–126)
ALT SERPL-CCNC: 34 U/L (ref 9–52)
ANION GAP SERPL CALC-SCNC: 12 MMOL/L
APTT BLD: 23.3 SEC (ref 22–30)
AST SERPL-CCNC: 33 U/L (ref 14–36)
BASOPHILS # BLD AUTO: 0 K/UL (ref 0–0.2)
BASOPHILS # BLD AUTO: 0 K/UL (ref 0–0.2)
BASOPHILS # BLD AUTO: 0.1 K/UL (ref 0–0.2)
BASOPHILS NFR BLD AUTO: 1 %
BUN SERPL-SCNC: 13 MG/DL (ref 7–17)
CALCIUM SPEC-MCNC: 8.7 MG/DL (ref 8.4–10.2)
CHLORIDE SERPL-SCNC: 108 MMOL/L (ref 98–107)
CK SERPL-CCNC: 142 U/L (ref 30–135)
CO2 SERPL-SCNC: 26 MMOL/L (ref 22–30)
D DIMER PPP FEU-MCNC: 1.94 MG/L FEU (ref ?–0.6)
EOSINOPHIL # BLD AUTO: 0.6 K/UL (ref 0–0.7)
EOSINOPHIL # BLD AUTO: 0.7 K/UL (ref 0–0.7)
EOSINOPHIL # BLD AUTO: 1 K/UL (ref 0–0.7)
EOSINOPHIL NFR BLD AUTO: 10 %
EOSINOPHIL NFR BLD AUTO: 11 %
EOSINOPHIL NFR BLD AUTO: 12 %
ERYTHROCYTE [DISTWIDTH] IN BLOOD BY AUTOMATED COUNT: 2.9 M/UL (ref 3.8–5.4)
ERYTHROCYTE [DISTWIDTH] IN BLOOD BY AUTOMATED COUNT: 2.95 M/UL (ref 3.8–5.4)
ERYTHROCYTE [DISTWIDTH] IN BLOOD BY AUTOMATED COUNT: 2.99 M/UL (ref 3.8–5.4)
ERYTHROCYTE [DISTWIDTH] IN BLOOD: 17.7 % (ref 11.5–15.5)
ERYTHROCYTE [DISTWIDTH] IN BLOOD: 17.8 % (ref 11.5–15.5)
ERYTHROCYTE [DISTWIDTH] IN BLOOD: 18.3 % (ref 11.5–15.5)
GLUCOSE SERPL-MCNC: 129 MG/DL (ref 74–99)
HCT VFR BLD AUTO: 27.1 % (ref 34–46)
HCT VFR BLD AUTO: 27.3 % (ref 34–46)
HCT VFR BLD AUTO: 27.7 % (ref 34–46)
HGB BLD-MCNC: 8.7 GM/DL (ref 11.4–16)
HGB BLD-MCNC: 8.8 GM/DL (ref 11.4–16)
HGB BLD-MCNC: 8.9 GM/DL (ref 11.4–16)
INR PPP: 1.2 (ref ?–1.2)
LYMPHOCYTES # SPEC AUTO: 1.2 K/UL (ref 1–4.8)
LYMPHOCYTES # SPEC AUTO: 1.3 K/UL (ref 1–4.8)
LYMPHOCYTES # SPEC AUTO: 1.5 K/UL (ref 1–4.8)
LYMPHOCYTES NFR SPEC AUTO: 18 %
LYMPHOCYTES NFR SPEC AUTO: 19 %
LYMPHOCYTES NFR SPEC AUTO: 20 %
MCH RBC QN AUTO: 29.4 PG (ref 25–35)
MCH RBC QN AUTO: 30 PG (ref 25–35)
MCH RBC QN AUTO: 30.1 PG (ref 25–35)
MCHC RBC AUTO-ENTMCNC: 31.8 G/DL (ref 31–37)
MCHC RBC AUTO-ENTMCNC: 31.9 G/DL (ref 31–37)
MCHC RBC AUTO-ENTMCNC: 32.8 G/DL (ref 31–37)
MCV RBC AUTO: 91.7 FL (ref 80–100)
MCV RBC AUTO: 92.6 FL (ref 80–100)
MCV RBC AUTO: 94 FL (ref 80–100)
MONOCYTES # BLD AUTO: 0.6 K/UL (ref 0–1)
MONOCYTES # BLD AUTO: 0.7 K/UL (ref 0–1)
MONOCYTES # BLD AUTO: 1 K/UL (ref 0–1)
MONOCYTES NFR BLD AUTO: 10 %
MONOCYTES NFR BLD AUTO: 10 %
MONOCYTES NFR BLD AUTO: 12 %
NEUTROPHILS # BLD AUTO: 3.6 K/UL (ref 1.3–7.7)
NEUTROPHILS # BLD AUTO: 3.9 K/UL (ref 1.3–7.7)
NEUTROPHILS # BLD AUTO: 4.5 K/UL (ref 1.3–7.7)
NEUTROPHILS NFR BLD AUTO: 55 %
NEUTROPHILS NFR BLD AUTO: 57 %
NEUTROPHILS NFR BLD AUTO: 57 %
PLATELET # BLD AUTO: 141 K/UL (ref 150–450)
PLATELET # BLD AUTO: 146 K/UL (ref 150–450)
PLATELET # BLD AUTO: 161 K/UL (ref 150–450)
POTASSIUM SERPL-SCNC: 3.7 MMOL/L (ref 3.5–5.1)
PROT SERPL-MCNC: 6.1 G/DL (ref 6.3–8.2)
PT BLD: 11.1 SEC (ref 9–12)
SODIUM SERPL-SCNC: 146 MMOL/L (ref 137–145)
TROPONIN I SERPL-MCNC: 0.01 NG/ML (ref 0–0.03)
WBC # BLD AUTO: 6.4 K/UL (ref 3.8–10.6)
WBC # BLD AUTO: 6.8 K/UL (ref 3.8–10.6)
WBC # BLD AUTO: 8.3 K/UL (ref 3.8–10.6)

## 2018-05-06 PROCEDURE — 80048 BASIC METABOLIC PNL TOTAL CA: CPT

## 2018-05-06 PROCEDURE — 86900 BLOOD TYPING SEROLOGIC ABO: CPT

## 2018-05-06 PROCEDURE — 36415 COLL VENOUS BLD VENIPUNCTURE: CPT

## 2018-05-06 PROCEDURE — 96360 HYDRATION IV INFUSION INIT: CPT

## 2018-05-06 PROCEDURE — 71275 CT ANGIOGRAPHY CHEST: CPT

## 2018-05-06 PROCEDURE — 86901 BLOOD TYPING SEROLOGIC RH(D): CPT

## 2018-05-06 PROCEDURE — 82550 ASSAY OF CK (CPK): CPT

## 2018-05-06 PROCEDURE — 84484 ASSAY OF TROPONIN QUANT: CPT

## 2018-05-06 PROCEDURE — 83880 ASSAY OF NATRIURETIC PEPTIDE: CPT

## 2018-05-06 PROCEDURE — 85379 FIBRIN DEGRADATION QUANT: CPT

## 2018-05-06 PROCEDURE — 85730 THROMBOPLASTIN TIME PARTIAL: CPT

## 2018-05-06 PROCEDURE — 80053 COMPREHEN METABOLIC PANEL: CPT

## 2018-05-06 PROCEDURE — 93005 ELECTROCARDIOGRAM TRACING: CPT

## 2018-05-06 PROCEDURE — 82553 CREATINE MB FRACTION: CPT

## 2018-05-06 PROCEDURE — 99285 EMERGENCY DEPT VISIT HI MDM: CPT

## 2018-05-06 PROCEDURE — 76604 US EXAM CHEST: CPT

## 2018-05-06 PROCEDURE — 85610 PROTHROMBIN TIME: CPT

## 2018-05-06 PROCEDURE — 71046 X-RAY EXAM CHEST 2 VIEWS: CPT

## 2018-05-06 PROCEDURE — 86850 RBC ANTIBODY SCREEN: CPT

## 2018-05-06 PROCEDURE — 85025 COMPLETE CBC W/AUTO DIFF WBC: CPT

## 2018-05-06 RX ADMIN — METOPROLOL TARTRATE SCH MG: 25 TABLET, FILM COATED ORAL at 20:40

## 2018-05-06 RX ADMIN — CALCIUM CARBONATE (ANTACID) CHEW TAB 500 MG SCH MG: 500 CHEW TAB at 19:43

## 2018-05-06 RX ADMIN — ESCITALOPRAM OXALATE SCH MG: 10 TABLET, FILM COATED ORAL at 20:41

## 2018-05-06 RX ADMIN — Medication SCH MG: at 20:40

## 2018-05-06 RX ADMIN — TOBRAMYCIN SCH APPLIC: 3 OINTMENT OPHTHALMIC at 20:41

## 2018-05-06 NOTE — CT
EXAMINATION TYPE: CT chest angio for PE

 

DATE OF EXAM: 5/6/2018

 

COMPARISON: NONE

 

HISTORY: SOB, elevated d dimer, history of ovarian CA

 

CT DLP: 139.6 mGycm

Automated exposure control for dose reduction was used.

 

CONTRAST: 

CT Chest for pulmonary embolism performed with with IV Contrast, patient injected with 80 mL of Isovu
e 370.

 

FINDINGS: There is a left-sided pleural effusion and a small right pleural effusion. There is depende
nt atelectasis in the dependent portions of the lungs.

 

There is no significant axillary, internal mammary, mediastinal or hilar adenopathy.

 

There is no evidence of pulmonary embolus.

 

Through the aorta is dilated measuring 4 cm. The proximal arch is ectatic measuring 3.1 cm. The remai
nder the aorta is normal in caliber without evidence of dissection.

 

There is a small amount of pericardial fluid or thickening. The heart is enlarged.

 

There is a moderate hiatal hernia present.

 

There is evidence of old granulomatous disease within the spleen.

 

There is hypertrophic spondylosis and degenerative disc disease within the spine.

 

IMPRESSION:

1. This examination is negative for pulmonary embolus.

2. Aneurysmal dilatation of the ascending thoracic aorta.

3. Cardiomegaly.

4. Bilateral effusions, larger on the left than the right.

5. Dependent atelectasis, greater on the left than the right.

6. Moderate hiatal hernia.

7. Old granulomatous disease of the spleen. 

8. Degenerative changes within the spine.

## 2018-05-06 NOTE — ED
General Adult HPI





- General


Chief complaint: Shortness of Breath


Stated complaint: tori following hydration for kidney, low hemoglobin


Time Seen by Provider: 18 11:30


Source: patient, RN notes reviewed


Mode of arrival: ambulatory


Limitations: no limitations





- History of Present Illness


Initial comments: 





This is a 72 year old female with past medical history significant for ovarian 

cancer.  Patient also states she has a history of some anemia.  Patient states 

over the last week she's becoming more more short of breath anytime she walks 

around she states she's even more short of breath.  Patient denies any recent 

fever chills or cough.  Patient denies any chest pain or palpitations.  Patient 

states she has been told she's been anemic but she has no why.  Patient denies 

any abdominal pain patient denies nausea vomiting or diarrhea.  Patient denies 

headache patient denies numbness weakness.  Patient denies lightheadedness 

dizziness or near syncopal episode.





- Related Data


 Home Medications











 Medication  Instructions  Recorded  Confirmed


 


Escitalopram [Lexapro] 10 mg PO HS 05/05/15 04/09/18


 


Cholecalciferol [Vitamin D3] 2,000 unit PO DAILY 16


 


Multivit-Min/Iron/Folic/Lutein 1 tab PO DAILY 17





[Centrum Silver Women Tablet]   


 


Ascorbic Acid [Vitamin C] 500 mg PO DAILY 17


 


Calcium Carbonate [Calcium] 1,200 mg PO DAILY 17


 


HYDROcodone/APAP 10-325MG [Norco 1 tab PO Q4HR PRN 17





]   


 


Ondansetron [Zofran] 4 mg PO Q12HR PRN 18








 Previous Rx's











 Medication  Instructions  Recorded


 


Folic Acid 1 mg PO DAILY@1200 #30 tab 04/10/17


 


Metoprolol Tartrate [Lopressor] 25 mg PO BID #60 tab 04/10/17


 


Thiamine [Vitamin B-1] 100 mg PO DAILY@1200 #30 tab 04/10/17


 


Ibuprofen [Motrin] 600 mg PO Q6HR PRN #20 tab 18











 Allergies











Allergy/AdvReac Type Severity Reaction Status Date / Time


 


aspirin Allergy  Unknown Verified 18 11:31














Review of Systems


ROS Statement: 


Those systems with pertinent positive or pertinent negative responses have been 

documented in the HPI.





ROS Other: All systems not noted in ROS Statement are negative.





Past Medical History


Past Medical History: Cancer, Heart Failure, COPD, GERD/Reflux, Hypertension


Additional Past Medical History / Comment(s): Ovarian cancer status post total 

abdominal hysterectomy and bilateral salpingo-oophorectomy and status post 

systemic chemotherapy, colon cancer status post colectomy, peptic ulcer disease

, osteoporosis, moderate aortic regurgitation with mild aortic stenosis, mild 

mitral regurgitation and secondary pulmonary hypertension as evident on 

echocardiogram, chronic anxiety, shingles, osteoarthritis


History of Any Multi-Drug Resistant Organisms: None Reported


Past Surgical History: Hernia Repair, Hysterectomy


Additional Past Surgical History / Comment(s): ELVIA and BSO, colectomy, 

abdominal hernia repair


Past Anesthesia/Blood Transfusion Reactions: No Reported Reaction


Additional Past Anesthesia/Blood Transfusion Reaction / Comment(s): Pt has 

never recieved blood.


Past Psychological History: Anxiety


Smoking Status: Never smoker


Past Alcohol Use History: None Reported


Past Drug Use History: None Reported





- Past Family History


  ** Father


Family Medical History: Cancer, Congestive Heart Failure (CHF)


Additional Family Medical History / Comment(s): Father had rheumatic fever as a 

child.  He  in his 60s.





  ** Mother


Family Medical History: Congestive Heart Failure (CHF)





General Exam





- General Exam Comments


Initial Comments: 





GENERAL:


Patient is well-developed and well-nourished.  Patient is nontoxic and well-

hydrated and is in mild distress.





ENT:


Neck is soft and supple.  No significant lymphadenopathy is noted.  Oropharynx 

is clear.  Moist mucous membranes. 





EYES:


The sclera were anicteric and conjunctiva were pink and moist.  Extraocular 

movements were intact and pupils were equal round and reactive to light.  

Eyelids were unremarkable.





PULMONARY:


Unlabored respirations.  Good breath sounds bilaterally.  No audible rales 

rhonchi or wheezing was noted.





CARDIOVASCULAR:


There is a regular rate and rhythm without any murmurs gallops or rubs.  





ABDOMEN:


Soft and nontender with normal bowel sounds.  No palpable organomegaly was 

noted.  There is no palpable pulsatile mass.





SKIN:


Patient's skin is pale





NEUROLOGIC:


Patient is alert and oriented x3.  Cranial nerves II through XII are grossly 

intact.  Motor and sensory are also intact.  Normal speech, volume and content.

  Symmetrical smile.  





MUSCULOSKELETAL:


Normal extremities with adequate strength and full range of motion.  No lower 

extremity swelling or edema.  No calf tenderness.





LYMPHATICS:


No significant lymphadenopathy is noted





PSYCHIATRIC:


Normal psychiatric evaluation.  


Limitations: no limitations





Course


 Vital Signs











  18





  11:29


 


Temperature 98.6 F


 


Pulse Rate 82


 


Respiratory 20





Rate 


 


Blood Pressure 188/86


 


O2 Sat by Pulse 98





Oximetry 














Medical Decision Making





- Medical Decision Making





EKG shows a normal sinus rhythm at 79 bpm VT interval 132 QRS is 82 QT interval 

386 QTC is 442.  Patient's EKG shows no acute changes.





- Lab Data


Result diagrams: 


 18 12:00





 18 12:00


 Lab Results











  18 Range/Units





  12:00 12:00 12:00 


 


WBC    6.8  (3.8-10.6)  k/uL


 


RBC    2.95 L  (3.80-5.40)  m/uL


 


Hgb    8.9 L  (11.4-16.0)  gm/dL


 


Hct    27.1 L  (34.0-46.0)  %


 


MCV    91.7  (80.0-100.0)  fL


 


MCH    30.1  (25.0-35.0)  pg


 


MCHC    32.8  (31.0-37.0)  g/dL


 


RDW    18.3 H  (11.5-15.5)  %


 


Plt Count    161  (150-450)  k/uL


 


Neutrophils %    57  %


 


Lymphocytes %    18  %


 


Monocytes %    10  %


 


Eosinophils %    11  %


 


Basophils %    1  %


 


Neutrophils #    3.9  (1.3-7.7)  k/uL


 


Lymphocytes #    1.2  (1.0-4.8)  k/uL


 


Monocytes #    0.7  (0-1.0)  k/uL


 


Eosinophils #    0.7  (0-0.7)  k/uL


 


Basophils #    0.0  (0-0.2)  k/uL


 


Anisocytosis    Slight  


 


PT     (9.0-12.0)  sec


 


INR     (<1.2)  


 


APTT     (22.0-30.0)  sec


 


D-Dimer     (<0.60)  mg/L FEU


 


Sodium     (137-145)  mmol/L


 


Potassium     (3.5-5.1)  mmol/L


 


Chloride     ()  mmol/L


 


Carbon Dioxide     (22-30)  mmol/L


 


Anion Gap     mmol/L


 


BUN     (7-17)  mg/dL


 


Creatinine     (0.52-1.04)  mg/dL


 


Est GFR (CKD-EPI)AfAm     (>60 ml/min/1.73 sqM)  


 


Est GFR (CKD-EPI)NonAf     (>60 ml/min/1.73 sqM)  


 


Glucose     (74-99)  mg/dL


 


Calcium     (8.4-10.2)  mg/dL


 


Total Bilirubin     (0.2-1.3)  mg/dL


 


AST     (14-36)  U/L


 


ALT     (9-52)  U/L


 


Alkaline Phosphatase     ()  U/L


 


Total Creatine Kinase   142 H   ()  U/L


 


CK-MB (CK-2)   1.3   (0.0-2.4)  ng/mL


 


CK-MB (CK-2) Rel Index   0.9   


 


Troponin I   0.013   (0.000-0.034)  ng/mL


 


NT-Pro-B Natriuret Pep     pg/mL


 


Total Protein     (6.3-8.2)  g/dL


 


Albumin     (3.5-5.0)  g/dL


 


Blood Type  A Positive    


 


Blood Type Confirm     


 


Blood Type Recheck  CABO Indicated    


 


Antibody Screen  NEGATIVE    


 


Spec Expiration Date  18 Range/Units





  12:00 12:00 12:00 


 


WBC     (3.8-10.6)  k/uL


 


RBC     (3.80-5.40)  m/uL


 


Hgb     (11.4-16.0)  gm/dL


 


Hct     (34.0-46.0)  %


 


MCV     (80.0-100.0)  fL


 


MCH     (25.0-35.0)  pg


 


MCHC     (31.0-37.0)  g/dL


 


RDW     (11.5-15.5)  %


 


Plt Count     (150-450)  k/uL


 


Neutrophils %     %


 


Lymphocytes %     %


 


Monocytes %     %


 


Eosinophils %     %


 


Basophils %     %


 


Neutrophils #     (1.3-7.7)  k/uL


 


Lymphocytes #     (1.0-4.8)  k/uL


 


Monocytes #     (0-1.0)  k/uL


 


Eosinophils #     (0-0.7)  k/uL


 


Basophils #     (0-0.2)  k/uL


 


Anisocytosis     


 


PT   11.1   (9.0-12.0)  sec


 


INR   1.2 H   (<1.2)  


 


APTT   23.3   (22.0-30.0)  sec


 


D-Dimer   1.94 H   (<0.60)  mg/L FEU


 


Sodium  146 H    (137-145)  mmol/L


 


Potassium  3.7    (3.5-5.1)  mmol/L


 


Chloride  108 H    ()  mmol/L


 


Carbon Dioxide  26    (22-30)  mmol/L


 


Anion Gap  12    mmol/L


 


BUN  13    (7-17)  mg/dL


 


Creatinine  1.41 H    (0.52-1.04)  mg/dL


 


Est GFR (CKD-EPI)AfAm  42    (>60 ml/min/1.73 sqM)  


 


Est GFR (CKD-EPI)NonAf  36    (>60 ml/min/1.73 sqM)  


 


Glucose  129 H    (74-99)  mg/dL


 


Calcium  8.7    (8.4-10.2)  mg/dL


 


Total Bilirubin  0.3    (0.2-1.3)  mg/dL


 


AST  33    (14-36)  U/L


 


ALT  34    (9-52)  U/L


 


Alkaline Phosphatase  90    ()  U/L


 


Total Creatine Kinase     ()  U/L


 


CK-MB (CK-2)     (0.0-2.4)  ng/mL


 


CK-MB (CK-2) Rel Index     


 


Troponin I     (0.000-0.034)  ng/mL


 


NT-Pro-B Natriuret Pep    1710  pg/mL


 


Total Protein  6.1 L    (6.3-8.2)  g/dL


 


Albumin  3.4 L    (3.5-5.0)  g/dL


 


Blood Type     


 


Blood Type Confirm     


 


Blood Type Recheck     


 


Antibody Screen     


 


Spec Expiration Date     














  18 Range/Units





  12:45 


 


WBC   (3.8-10.6)  k/uL


 


RBC   (3.80-5.40)  m/uL


 


Hgb   (11.4-16.0)  gm/dL


 


Hct   (34.0-46.0)  %


 


MCV   (80.0-100.0)  fL


 


MCH   (25.0-35.0)  pg


 


MCHC   (31.0-37.0)  g/dL


 


RDW   (11.5-15.5)  %


 


Plt Count   (150-450)  k/uL


 


Neutrophils %   %


 


Lymphocytes %   %


 


Monocytes %   %


 


Eosinophils %   %


 


Basophils %   %


 


Neutrophils #   (1.3-7.7)  k/uL


 


Lymphocytes #   (1.0-4.8)  k/uL


 


Monocytes #   (0-1.0)  k/uL


 


Eosinophils #   (0-0.7)  k/uL


 


Basophils #   (0-0.2)  k/uL


 


Anisocytosis   


 


PT   (9.0-12.0)  sec


 


INR   (<1.2)  


 


APTT   (22.0-30.0)  sec


 


D-Dimer   (<0.60)  mg/L FEU


 


Sodium   (137-145)  mmol/L


 


Potassium   (3.5-5.1)  mmol/L


 


Chloride   ()  mmol/L


 


Carbon Dioxide   (22-30)  mmol/L


 


Anion Gap   mmol/L


 


BUN   (7-17)  mg/dL


 


Creatinine   (0.52-1.04)  mg/dL


 


Est GFR (CKD-EPI)AfAm   (>60 ml/min/1.73 sqM)  


 


Est GFR (CKD-EPI)NonAf   (>60 ml/min/1.73 sqM)  


 


Glucose   (74-99)  mg/dL


 


Calcium   (8.4-10.2)  mg/dL


 


Total Bilirubin   (0.2-1.3)  mg/dL


 


AST   (14-36)  U/L


 


ALT   (9-52)  U/L


 


Alkaline Phosphatase   ()  U/L


 


Total Creatine Kinase   ()  U/L


 


CK-MB (CK-2)   (0.0-2.4)  ng/mL


 


CK-MB (CK-2) Rel Index   


 


Troponin I   (0.000-0.034)  ng/mL


 


NT-Pro-B Natriuret Pep   pg/mL


 


Total Protein   (6.3-8.2)  g/dL


 


Albumin   (3.5-5.0)  g/dL


 


Blood Type   


 


Blood Type Confirm  A Positive  


 


Blood Type Recheck   


 


Antibody Screen   


 


Spec Expiration Date   














Disposition


Clinical Impression: 


 Pleural effusion, Dyspnea, Anemia





Disposition: ADMITTED AS IP TO THIS HOSP


Referrals: 


PINA Lay III, MD [Primary Care Provider] - 1-2 days


Time of Disposition: 13:41

## 2018-05-06 NOTE — XR
EXAMINATION TYPE: XR chest 2V

 

DATE OF EXAM: 5/6/2018

 

HISTORY: difficulty breathing.

 

REFERENCE: Previous study dated 4/10/2017.

 

FINDINGS: Lung volumes are prominent. The heart is enlarged. There is a left-sided effusion which has
 enlarged slightly from previous. There is mild vascular congestion without matthew edema.

 

IMPRESSION: 

1. COPD.

2. CARDIOMEGALY.

3. ENLARGING LEFT-SIDED EFFUSION.

## 2018-05-06 NOTE — P.HPIM
History of Present Illness


H&P Date: 18


Chief Complaint: Shortness of breath





Patient is a 78-year-old female with a known history of ovarian cancer 

currently undergoing chemotherapy, last on 2018 came to the hospital with 

the complaints of shortness of breath with past to 3 weeks which is gradually 

worsening.  Patient says that she also fell about 2 weeks ago.


Patient states over the last week she's becoming more more short of breath 

anytime she walks around she states she's even more short of breath.  Patient 

denies any recent fever chills or cough.  Patient denies any chest pain or 

palpitations.  Patient states she has been told she's been anemic .  Patient 

denies any abdominal pain patient denies nausea vomiting or diarrhea.  Patient 

denies headache patient denies numbness weakness.  Patient denies 

lightheadedness dizziness or near syncopal episode.





Patient was found have elevated d-dimer.  CT angiography of the chest showed no 

evidence of pulmonary embolism.


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  cardiomegaly.  Bilateral 

effusions larger on the left than the right.  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 

spleen





Hemoglobin 8.9 on admission.  Hemoglobin was 11 a month ago.








Review of Systems





Constitutional: Patient denies any fever or chills .  No generalized weakness 

or weight loss.  


Abdomen: Patient denied nausea vomiting and diarrhea and abdominal pain.


Cardiovascular: Patient denies any chest pain. no palpitations.  No leg swelling


Respiratory: Cough without sputum production and shortness of breath. 


Neurologic: Patient denied any numbness or tingling headache.


Musculoskeletal: Patient denies any complaints of joint swelling or deformity.


Skin: Negative


Psychiatric: Negative


Endocrine: No heat or cold intolerance.  No recent weight gain.


Genitourinary: No dysuria or hematuria.


All other 14 point ROS negative except the above





Past Medical History


Past Medical History: Cancer, Heart Failure, COPD, GERD/Reflux, Hypertension


Additional Past Medical History / Comment(s): Ovarian cancer status post total 

abdominal hysterectomy and bilateral salpingo-oophorectomy and status post 

systemic chemotherapy, colon cancer status post colectomy, peptic ulcer disease

, osteoporosis, moderate aortic regurgitation with mild aortic stenosis, mild 

mitral regurgitation and secondary pulmonary hypertension as evident on 

echocardiogram, chronic anxiety, shingles, osteoarthritis


History of Any Multi-Drug Resistant Organisms: None Reported


Past Surgical History: Hernia Repair, Hysterectomy


Additional Past Surgical History / Comment(s): ELVIA and BSO, colectomy, 

abdominal hernia repair


Past Anesthesia/Blood Transfusion Reactions: No Reported Reaction


Additional Past Anesthesia/Blood Transfusion Reaction / Comment(s): Pt has 

never recieved blood.


Past Psychological History: Anxiety


Additional Psychological History / Comment(s): Pt lives alone in a single story 

home that has 1 step to get into.  She has a very supportive family and good 

friends and neighbors.  She is normally active and still works at [employer]. 

since recent sx was set up with [facility]- home care had first visist 

today.. has no hospital equipment.


Smoking Status: Never smoker


Past Alcohol Use History: None Reported


Past Drug Use History: None Reported





- Past Family History


  ** Father


Family Medical History: Cancer, Congestive Heart Failure (CHF)


Additional Family Medical History / Comment(s): Father had rheumatic fever as a 

child.  He  in his 60s.





  ** Mother


Family Medical History: Congestive Heart Failure (CHF)





Medications and Allergies


 Home Medications











 Medication  Instructions  Recorded  Confirmed  Type


 


Escitalopram [Lexapro] 10 mg PO HS 05/05/15 05/06/18 History


 


Cholecalciferol [Vitamin D3] 2,000 unit PO DAILY 16 History


 


Multivit-Min/Iron/Folic/Lutein 1 tab PO DAILY 17 History





[Centrum Silver Women Tablet]    


 


Ascorbic Acid [Vitamin C] 500 mg PO DAILY 17 History


 


Calcium Carbonate [Calcium] 1,200 mg PO DAILY 17 History


 


Folic Acid 1 mg PO DAILY@1200 #30 tab 04/10/17 05/06/18 Rx


 


Metoprolol Tartrate [Lopressor] 25 mg PO BID #60 tab 04/10/17 05/06/18 Rx


 


Thiamine [Vitamin B-1] 100 mg PO DAILY@1200 #30 tab 04/10/17 05/06/18 Rx


 


Ibuprofen [Motrin] 600 mg PO Q6HR PRN #20 tab 18 Rx











 Allergies











Allergy/AdvReac Type Severity Reaction Status Date / Time


 


aspirin Allergy  Unknown Verified 18 14:01














Physical Exam


Vitals: 


 Vital Signs











  Temp Pulse Pulse Resp BP BP Pulse Ox


 


 18 15:07    73  18   


 


 18 14:32  97.9 F   73  18   185/83  98


 


 18 13:55  98.7 F  76   18  166/86   98


 


 18 13:00   70   18  175/75   96


 


 18 11:29  98.6 F  82   20  188/86   98








 Intake and Output











 18





 06:59 14:59 22:59


 


Other:   


 


  Weight  59.874 kg 














PHYSICAL EXAMINATION: 


Patient is lying in the bed comfortably, no acute distress, awake alert and 

oriented.. 


HEENT: Normocephalic.  Left eye stye.  Neck is supple. Pupils reactive. 

Nostrils clear. Oral cavity is moist. Ears reveal no drainage. 


Neck reveals no JVD, carotid bruits, or thyromegaly. 


CHEST EXAMINATION: Trachea is central. Symmetrical expansion.  Right basilar 

crackles and diminished breath sounds left lower lung. 


CARDIAC: Normal S1, S2 with no gallops. No murmurs 


ABDOMEN: Soft. Bowel sounds normal. No organomegaly. No abdominal bruits. 


Extremities: reveal no edema.  No clubbing or cyanosis


Neurologically awake, alert, oriented x3 with well-coordinated movements.  No 

focal deficits noted


Skin: No rash or skin lesions. 


Psychiatric: Cooperative.  Nonsuicidal


Musculoskeletal: No joint swelling or deformity.  Normal range of motion.








Results


CBC & Chem 7: 


 18 20:57





 18 12:00


Labs: 


 Abnormal Lab Results - Last 24 Hours (Table)











  18 Range/Units





  12:00 12:00 12:00 


 


RBC   2.95 L   (3.80-5.40)  m/uL


 


Hgb   8.9 L   (11.4-16.0)  gm/dL


 


Hct   27.1 L   (34.0-46.0)  %


 


RDW   18.3 H   (11.5-15.5)  %


 


INR     (<1.2)  


 


D-Dimer     (<0.60)  mg/L FEU


 


Sodium    146 H  (137-145)  mmol/L


 


Chloride    108 H  ()  mmol/L


 


Creatinine    1.41 H  (0.52-1.04)  mg/dL


 


Glucose    129 H  (74-99)  mg/dL


 


Total Creatine Kinase  142 H    ()  U/L


 


Total Protein    6.1 L  (6.3-8.2)  g/dL


 


Albumin    3.4 L  (3.5-5.0)  g/dL














  18 Range/Units





  12:00 


 


RBC   (3.80-5.40)  m/uL


 


Hgb   (11.4-16.0)  gm/dL


 


Hct   (34.0-46.0)  %


 


RDW   (11.5-15.5)  %


 


INR  1.2 H  (<1.2)  


 


D-Dimer  1.94 H  (<0.60)  mg/L FEU


 


Sodium   (137-145)  mmol/L


 


Chloride   ()  mmol/L


 


Creatinine   (0.52-1.04)  mg/dL


 


Glucose   (74-99)  mg/dL


 


Total Creatine Kinase   ()  U/L


 


Total Protein   (6.3-8.2)  g/dL


 


Albumin   (3.5-5.0)  g/dL














Thrombosis Risk Factor Assmnt





- DVT/VTE Prophylaxis


DVT/VTE Prophylaxis: Pharmacologic Prophylaxis ordered





- Choose All That Apply


Each Risk Factor Represents 3 Points: Age 75 years or older


Thrombosis Risk Factor Assessment Total Risk Factor Score: 3


Thrombosis Risk Factor Assessment Level: Moderate Risk





Assessment and Plan


Assessment: 





Dyspnea secondary to large left lung pleural effusion


Ovarian cancer with history of resection and recurrence.  Currently undergoing 

chemotherapy last on 2018


Acute on chronic anemia hemoglobin 8.9 on admission. was 11 previously


Colon cancer status post colectomy


COPD


GERD/peptic ulcer disease


Hypertension


Moderate aortic regurgitation with mild aortic stenosis and mild mitral 

regurgitation


Secondary pulmonary hypertension


Osteoarthritis


Anxiety and depression


DVT prophylaxis





Plan:


Agent will be continued on home medications.  Monitor H&H.  Pulmonary will be 

consulted.  Patient related paracentesis and fluid cytology.  Further 

recommendations based on the clinical course.  Prognosis is guarded with 

multiple medical problems and call conditions.


Discussed with her daughter at bedside in detail.





Time with Patient: Greater than 30

## 2018-05-07 LAB
ANION GAP SERPL CALC-SCNC: 13 MMOL/L
BASOPHILS # BLD AUTO: 0.1 K/UL (ref 0–0.2)
BASOPHILS NFR BLD AUTO: 1 %
BUN SERPL-SCNC: 13 MG/DL (ref 7–17)
CALCIUM SPEC-MCNC: 8 MG/DL (ref 8.4–10.2)
CHLORIDE SERPL-SCNC: 109 MMOL/L (ref 98–107)
CO2 SERPL-SCNC: 23 MMOL/L (ref 22–30)
EOSINOPHIL # BLD AUTO: 1 K/UL (ref 0–0.7)
EOSINOPHIL NFR BLD AUTO: 15 %
ERYTHROCYTE [DISTWIDTH] IN BLOOD BY AUTOMATED COUNT: 2.77 M/UL (ref 3.8–5.4)
ERYTHROCYTE [DISTWIDTH] IN BLOOD: 18.4 % (ref 11.5–15.5)
GLUCOSE SERPL-MCNC: 89 MG/DL (ref 74–99)
HCT VFR BLD AUTO: 26.5 % (ref 34–46)
HGB BLD-MCNC: 8.4 GM/DL (ref 11.4–16)
LYMPHOCYTES # SPEC AUTO: 1.4 K/UL (ref 1–4.8)
LYMPHOCYTES NFR SPEC AUTO: 19 %
MCH RBC QN AUTO: 30.4 PG (ref 25–35)
MCHC RBC AUTO-ENTMCNC: 31.8 G/DL (ref 31–37)
MCV RBC AUTO: 95.5 FL (ref 80–100)
MONOCYTES # BLD AUTO: 0.6 K/UL (ref 0–1)
MONOCYTES NFR BLD AUTO: 9 %
NEUTROPHILS # BLD AUTO: 3.7 K/UL (ref 1.3–7.7)
NEUTROPHILS NFR BLD AUTO: 53 %
PLATELET # BLD AUTO: 130 K/UL (ref 150–450)
POTASSIUM SERPL-SCNC: 3.8 MMOL/L (ref 3.5–5.1)
SODIUM SERPL-SCNC: 145 MMOL/L (ref 137–145)
WBC # BLD AUTO: 7 K/UL (ref 3.8–10.6)

## 2018-05-07 RX ADMIN — ACETAMINOPHEN PRN MG: 325 TABLET, FILM COATED ORAL at 10:51

## 2018-05-07 RX ADMIN — OXYCODONE HYDROCHLORIDE AND ACETAMINOPHEN SCH MG: 500 TABLET ORAL at 07:52

## 2018-05-07 RX ADMIN — ESCITALOPRAM OXALATE SCH MG: 10 TABLET, FILM COATED ORAL at 21:55

## 2018-05-07 RX ADMIN — CALCIUM CARBONATE (ANTACID) CHEW TAB 500 MG SCH MG: 500 CHEW TAB at 13:05

## 2018-05-07 RX ADMIN — DOCUSATE SODIUM SCH MG: 100 CAPSULE, LIQUID FILLED ORAL at 13:02

## 2018-05-07 RX ADMIN — Medication SCH MG: at 13:07

## 2018-05-07 RX ADMIN — ACETAMINOPHEN PRN MG: 325 TABLET, FILM COATED ORAL at 01:33

## 2018-05-07 RX ADMIN — Medication SCH UNIT: at 07:52

## 2018-05-07 RX ADMIN — DOCUSATE SODIUM SCH MG: 100 CAPSULE, LIQUID FILLED ORAL at 21:55

## 2018-05-07 RX ADMIN — METOPROLOL TARTRATE SCH MG: 25 TABLET, FILM COATED ORAL at 07:52

## 2018-05-07 RX ADMIN — METOPROLOL TARTRATE SCH MG: 25 TABLET, FILM COATED ORAL at 21:56

## 2018-05-07 RX ADMIN — POLYETHYLENE GLYCOL 3350 SCH GM: 17 POWDER, FOR SOLUTION ORAL at 13:02

## 2018-05-07 RX ADMIN — TOBRAMYCIN SCH APPLIC: 3 OINTMENT OPHTHALMIC at 13:02

## 2018-05-07 RX ADMIN — Medication SCH MG: at 21:55

## 2018-05-07 RX ADMIN — THERA TABS SCH EACH: TAB at 13:06

## 2018-05-07 RX ADMIN — FOLIC ACID SCH MG: 1 TABLET ORAL at 13:06

## 2018-05-07 RX ADMIN — TOBRAMYCIN SCH APPLIC: 3 OINTMENT OPHTHALMIC at 21:55

## 2018-05-07 RX ADMIN — ACETAMINOPHEN PRN MG: 325 TABLET, FILM COATED ORAL at 21:55

## 2018-05-07 NOTE — US
EXAMINATION TYPE: US chest

 

DATE OF EXAM: 5/7/2018

 

COMPARISON: NONE

 

CLINICAL HISTORY: sob, left pleural effusion.

 

EXAM MEASUREMENTS:

 

Right Pleural Effusion fluid pocket: 1.8 cm

**  Right skin to fluid thickness:  2.1 cm

Left Pleural Effusion fluid pocket:  6.0 cm

**  Left skin to fluid thickness:  2.6 cm

 

 

Right side Not marked for possible thoracentesis outside the dept.

 

Left side marked for possible thoracentesis outside the dept.

 

Pulmonologists are able to review the images in the patient?s EMR.  

 

 

IMPRESSIONS: Small left pleural effusion

## 2018-05-07 NOTE — P.CONS
History of Present Illness





- Reason for Consult


Consult date: 18


Ovarian cancer


Requesting physician: Savage Leroy





- Chief Complaint


Shortness of Breath





- History of Present Illness





Ms Avelar is a pleasant WF, initially seen in consult at [facility] on 5/8/15. She was 

admitted with a 2-3 day h/o abdominal discomfort, which was generalized, 

bloating, and then emesis x 1. Over the prior week, she had noted severe 

heartburn, and decreased appetite and minor weight loss over the prior mth. She 

was found to have acute renal failure also. A CT of the A/P without contrast 

revealed a ascites. She had an ascitic tap of 4.7 L, with cytology positive for 

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 

Her TVUS did show an endocervical mass like area 1.2 cm, though that is not 

definite as the origin. Her Ca 125 was 2760.


 She was discharged from the hospital on 5/8/15, with labs on that day 

revealing normalization of Cr with hydration. She had a PET scan on 5/15/15 and 

was seen for her 1st OV on the same day. The PET showed no liver involvement or 

disease outside the A/P. She was referred to Dr Eden, and initial treatment 

with Carbo and dose dense Taxol was recommended. She had 3 cycles, and then 

proceeded to surgery on 9/21/15.


   She tolerated surgery well, and resumed chemo on 10/24/15. She is now s/p 6 

cycles total. CT scans after cycle 6 showed no evidence of recurrence. Per my 

discussion with Dr Eden, chemo was stopped after cycle 6, and she was placed 

on surveillance. She completed chemo in 12/15.


   Ct scans , per Dr Eden , in 10/16 did not reveal any definite evidence of 

disease recurrence per the pt.  level did show an elevation. She 

continued f/u with Dr Eden, with repeat labs in  showing further increase 

in Ca 125. She had 2nd look surgery in early 3/17, with apparently all obvious 

tumor removed. She was admitted to [facility] on 3/20/17 with abdominal pain and 

emesis. She was transferred back to [facility], and had surgery for an anastomotic 

leak. She was discharged home in early . She was readmitted to [facility] due to 

anasarca, and weakness. 


   She did improve with aggressive treatment, and was discharged on 17.


   Case was d/w Dr Eden. Per his recommendations, she was started on Doxil  

on 17, and is s/p 4 cycles.


   Ct scans and  after cycle 4 showed progression.


   She was started on Carbo /Gemzar on 10/9/17 and is s/p 6 cycles. Dose was 

reduced for cycle 3 and for #4 due to hematologic toxicity. She is also 

supported with procrit and neulasta.


   Ct scans in  showed progression. Thus Carbo/Gemzar, which was also 

causing progressive hematologic toxicity, was stopped. 


   She was seen by Dr Eden and change to Alimta was recommended. She started 

that on 3/19/18 and is s/p 2 cycles.


  


Her Ca125 was 1743 on 3/26/18 and 1248 on 18. She was noted to have an 

increased creatinine in office on  = 1.94 (Previously 4/3/18 = 0.95), She 

was ordered IV Hydration and ALmta held until improvement in kidneys. After two 

liters of IV Hydration her Creatinine increased to 2.49 on 18, therefore 3 

more Liters were given. She gradually began to have increasing shortness of 

breath and presented to ED for further Evaluation. Her last echocardiogram was 

in . She denied any dysuria, frequency or hesitency. A chest xray revelaed 

bilateral pleural effusions with left greater than right. 








Review of Systems





A 14 point review of systems assessed and completed and negative except HPI





Past Medical History


Past Medical History: Cancer, Heart Failure, COPD, GERD/Reflux, Hypertension


Additional Past Medical History / Comment(s): Ovarian cancer status post total 

abdominal hysterectomy and bilateral salpingo-oophorectomy and status post 

systemic chemotherapy, colon cancer status post colectomy, peptic ulcer disease

, osteoporosis, moderate aortic regurgitation with mild aortic stenosis, mild 

mitral regurgitation and secondary pulmonary hypertension as evident on 

echocardiogram, chronic anxiety, shingles, osteoarthritis


History of Any Multi-Drug Resistant Organisms: None Reported


Past Surgical History: Hernia Repair, Hysterectomy


Additional Past Surgical History / Comment(s): ELVIA and BSO, colectomy, 

abdominal hernia repair


Past Anesthesia/Blood Transfusion Reactions: No Reported Reaction


Additional Past Anesthesia/Blood Transfusion Reaction / Comm: Pt has never 

recieved blood.


Past Psychological History: Anxiety


Additional Psychological History / Comment(s): Pt lives alone in a single story 

home that has 1 step to get into.  She has a very supportive family and good 

friends and neighbors.  She is normally active and still works at [employer]. 

since recent sx [facility]- home care had first visist 

today.. has no hospital equipment.


Smoking Status: Never smoker


Past Alcohol Use History: None Reported


Past Drug Use History: None Reported





- Past Family History


  ** Father


Family Medical History: Cancer, Congestive Heart Failure (CHF)


Additional Family Medical History / Comment(s): Father had rheumatic fever as a 

child.  He  in his 60s.





  ** Mother


Family Medical History: Congestive Heart Failure (CHF)





Medications and Allergies


 Home Medications











 Medication  Instructions  Recorded  Confirmed  Type


 


Escitalopram [Lexapro] 10 mg PO HS 05/05/15 05/06/18 History


 


Cholecalciferol [Vitamin D3] 2,000 unit PO DAILY 16 History


 


Multivit-Min/Iron/Folic/Lutein 1 tab PO DAILY 17 History





[Centrum Silver Women Tablet]    


 


Ascorbic Acid [Vitamin C] 500 mg PO DAILY 17 History


 


Calcium Carbonate [Calcium] 1,200 mg PO DAILY 17 History


 


Folic Acid 1 mg PO DAILY@1200 #30 tab 04/10/17 05/06/18 Rx


 


Metoprolol Tartrate [Lopressor] 25 mg PO BID #60 tab 04/10/17 05/06/18 Rx


 


Thiamine [Vitamin B-1] 100 mg PO DAILY@1200 #30 tab 04/10/17 05/06/18 Rx


 


Ibuprofen [Motrin] 600 mg PO Q6HR PRN #20 tab 18 Rx











 Allergies











Allergy/AdvReac Type Severity Reaction Status Date / Time


 


aspirin Allergy  Unknown Verified 18 14:01














Physical Exam


Vitals: 


 Vital Signs











  Temp Pulse Pulse Resp BP BP Pulse Ox


 


 18 07:15  97.0 F L   70  18   125/77  98


 


 18 18:00    73  18   


 


 18 17:14       164/70 


 


 18 15:07    73  18   


 


 18 14:32  97.9 F   73  18   185/83  98


 


 18 13:55  98.7 F  76   18  166/86   98


 


 18 13:00   70   18  175/75   96








 Intake and Output











 18





 22:59 06:59 14:59


 


Other:   


 


  # Voids 1 1 














- Constitutional


General appearance: average body habitus, cooperative, no acute distress





- EENT


Eyes: EOMI, PERRLA, dentition normal


ENT: NA/AT, normal oropharynx





- Neck





Supple, Trachea Midline


Neck: normal ROM





- Respiratory


Respiratory: right: CTA, left: diminished





- Cardiovascular


Rhythm: regular


Heart sounds: normal: S1, S2


  ** leg


Peripheral Edema: bilateral: Trace





  ** foot


Peripheral Edema: bilateral: Trace





- Gastrointestinal


General gastrointestinal: normal bowel sounds, soft





- Integumentary


Integumentary: pale





- Neurologic





No Focal Defects


Neurologic: CNII-XII intact





- Musculoskeletal


Musculoskeletal: gait normal, generalized weakness, strength equal bilaterally





- Psychiatric


Psychiatric: A&O x's 3, appropriate affect, intact judgment & insight





Results


CBC & Chem 7: 


 18 07:30





 18 07:30


Labs: 


 Abnormal Lab Results - Last 24 Hours (Table)











  18 Range/Units





  12:00 12:00 12:00 


 


RBC   2.95 L   (3.80-5.40)  m/uL


 


Hgb   8.9 L   (11.4-16.0)  gm/dL


 


Hct   27.1 L   (34.0-46.0)  %


 


RDW   18.3 H   (11.5-15.5)  %


 


Plt Count     (150-450)  k/uL


 


Eosinophils #     (0-0.7)  k/uL


 


INR     (<1.2)  


 


D-Dimer     (<0.60)  mg/L FEU


 


Sodium    146 H  (137-145)  mmol/L


 


Chloride    108 H  ()  mmol/L


 


Creatinine    1.41 H  (0.52-1.04)  mg/dL


 


Glucose    129 H  (74-99)  mg/dL


 


Calcium     (8.4-10.2)  mg/dL


 


Total Creatine Kinase  142 H    ()  U/L


 


Total Protein    6.1 L  (6.3-8.2)  g/dL


 


Albumin    3.4 L  (3.5-5.0)  g/dL














  18 Range/Units





  12:00 15:08 20:57 


 


RBC   2.90 L  2.99 L  (3.80-5.40)  m/uL


 


Hgb   8.7 L  8.8 L  (11.4-16.0)  gm/dL


 


Hct   27.3 L  27.7 L  (34.0-46.0)  %


 


RDW   17.8 H  17.7 H  (11.5-15.5)  %


 


Plt Count   146 L  141 L  (150-450)  k/uL


 


Eosinophils #    1.0 H  (0-0.7)  k/uL


 


INR  1.2 H    (<1.2)  


 


D-Dimer  1.94 H    (<0.60)  mg/L FEU


 


Sodium     (137-145)  mmol/L


 


Chloride     ()  mmol/L


 


Creatinine     (0.52-1.04)  mg/dL


 


Glucose     (74-99)  mg/dL


 


Calcium     (8.4-10.2)  mg/dL


 


Total Creatine Kinase     ()  U/L


 


Total Protein     (6.3-8.2)  g/dL


 


Albumin     (3.5-5.0)  g/dL














  18 Range/Units





  07:30 07:30 


 


RBC  2.77 L   (3.80-5.40)  m/uL


 


Hgb  8.4 L   (11.4-16.0)  gm/dL


 


Hct  26.5 L   (34.0-46.0)  %


 


RDW  18.4 H   (11.5-15.5)  %


 


Plt Count  130 L   (150-450)  k/uL


 


Eosinophils #  1.0 H   (0-0.7)  k/uL


 


INR    (<1.2)  


 


D-Dimer    (<0.60)  mg/L FEU


 


Sodium    (137-145)  mmol/L


 


Chloride   109 H  ()  mmol/L


 


Creatinine   1.27 H  (0.52-1.04)  mg/dL


 


Glucose    (74-99)  mg/dL


 


Calcium   8.0 L  (8.4-10.2)  mg/dL


 


Total Creatine Kinase    ()  U/L


 


Total Protein    (6.3-8.2)  g/dL


 


Albumin    (3.5-5.0)  g/dL











Chest x-ray: report reviewed


CT scan - chest: report reviewed





Assessment and Plan


(1) Dyspnea


Current Visit: Yes   Status: Acute   Code(s): R06.00 - DYSPNEA, UNSPECIFIED   

SNOMED Code(s): 255871617


   





(2) Pleural effusion


Current Visit: Yes   Status: Acute   Code(s): J90 - PLEURAL EFFUSION, NOT 

ELSEWHERE CLASSIFIED   SNOMED Code(s): 16532830


   





(3) Acute renal failure


Current Visit: No   Status: Acute   Code(s): N17.9 - ACUTE KIDNEY FAILURE, 

UNSPECIFIED   SNOMED Code(s): 37218826


   





(4) Ovarian cancer


Current Visit: No   Status: Acute   Priority: High   Code(s): C56.9 - MALIGNANT 

NEOPLASM OF UNSPECIFIED OVARY   SNOMED Code(s): 051728130


   


Plan: 





Assessment and Recommendations:





1. Ovarian Cancer - With Recently Rising Ca 125


 - Recently Began Treatment with single dose ALmta as outpatient, Status post 

two treatments, last on 


 - Ca 125 on 3/27/18 = 1743, On 18 = 1248


 - Continue on Folic Acid while on treatment


 - Restaging CT scans, likely PO contrast only) this week as out patient





2. Acute renal Insufficiency/Failure - Unclear etiology of rising creat


 - Chemotherapy versus other underlying issue (i.e. UTI, medication)


 - Overall Improving





3. Dyspnea - Increasing with Exertion 


 - Secondary to Bilateral Pleural Effusions, likely secondary to increased 

Hydration treatment for DAVID


 - Improving overall


 - Last Echocardiogram in 2017, resonable to recheck as outpatient





4. Bilateral pleural effusions - Improving


 - Evaluation for thoracentesis


 - Pulmonary Following. 





Physician Attestation: I have completed the full history and physical of this 

patient and agree with above dictation by Adrianne Nielson NP, Dictated as a 

scribe.

## 2018-05-07 NOTE — P.CNPUL
History of Present Illness


Consult date: 18


Requesting physician: Terry Freeman


Reason for consult: dyspnea, pleural effusion


Chief complaint: Dyspnea


History of present illness: 


Mrs. Avelar is a 78 -year-old white female patient of Dr. Lay who presented 

to emergency with increasing dyspnea, weakness.  Patient has a history of 

ovarian cancer status post total abdominal hysterectomy and bilateral salpingo-

oophorectomy and post systemic chemotherapy, colon cancer status post 

colectomy.  For the past week she has been receiving outpatient IV hydration 

therapy Monday through Friday and she states she has been increasingly more 

short of breath.  By Friday she felt she could hardly walk to her car, and on 

Saturday her daughter insisted her mom go to the emergency department for 

evaluation.  She denies any fever or chills, denies any cough.  Denied chest 

pain or palpitations.  She denies any lightheadedness, dizziness, or syncope.  

Denied any nausea vomiting or diarrhea.  She has been having problems with 

constipation, and has been having issues with increased abdominal distention.  

Chest x-ray showed COPD, cardiomegaly and an enlarging left-sided effusion.  

Chest CTA was done in regards to shortness of breath and an elevated d-dimer, 

and showed no evidence of pulmonary embolus, and bilateral pleural effusion, 

left greater than the right, and dependent atelectasis in the dependent 

portions of the lungs.  There was no evidence of axillary, internal mammary, or 

mediastinal or hilar adenopathy.  Ultrasound of the chest showed right pleural 

effusion pocket of 1.8 cm, and left pleural effusion pocket of 6.0 cm.  Patient 

has been afebrile, no evidence of leukocytosis, hemoglobin is 8.9.  BUN is 13, 

and creatinine was 1.41 on admission.  Cardiac enzymes and proBNP were within 

normal limits.  Patient has received IV hydration with 1 L of 0.9 normal saline

, and her renal profile has improved.  We were asked to see the patient in 

regards to the left pleural effusion.  Patient's last echocardiogram was on 2017 showed left ventricular systolic function within normal limits with an 

EF 55-60%, mild pulmonary hypertension with right ventricular systolic pressure 

of 42 mmHg. 








Review of Systems


All systems: negative


Constitutional: Denies chills, Denies fever


Eyes: denies blurred vision, denies pain


Ears, nose, mouth and throat: Denies headache, Denies sore throat


Cardiovascular: Denies chest pain, Denies shortness of breath


Respiratory: Reports dyspnea, Denies cough


Gastrointestinal: Reports bloating, Reports change in bowel habits, Reports 

constipation, Reports heartburn, Reports loss of appetite, Denies abdominal pain

, Denies diarrhea, Denies nausea, Denies vomiting


Genitourinary: Denies dysuria, Denies hematuria


Musculoskeletal: Denies myalgias


Integumentary: Denies pruritus, Denies rash


Neurological: Denies numbness, Denies weakness


Psychiatric: Denies anxiety, Denies depression


Endocrine: Denies fatigue, Denies weight change





Past Medical History


Past Medical History: Cancer, Heart Failure, COPD, GERD/Reflux, Hypertension


Additional Past Medical History / Comment(s): Ovarian cancer status post total 

abdominal hysterectomy and bilateral salpingo-oophorectomy and status post 

systemic chemotherapy, colon cancer status post colectomy, peptic ulcer disease

, osteoporosis, moderate aortic regurgitation with mild aortic stenosis, mild 

mitral regurgitation and secondary pulmonary hypertension as evident on 

echocardiogram, chronic anxiety, shingles, osteoarthritis


History of Any Multi-Drug Resistant Organisms: None Reported


Past Surgical History: Hernia Repair, Hysterectomy


Additional Past Surgical History / Comment(s): ELVIA and BSO, colectomy, 

abdominal hernia repair


Past Anesthesia/Blood Transfusion Reactions: No Reported Reaction


Additional Past Anesthesia/Blood Transfusion Reaction / Comment(s): Pt has 

never recieved blood.


Past Psychological History: Anxiety


Additional Psychological History / Comment(s): Pt lives alone in a single story 

home that has 1 step to get into.  She has a very supportive family and good 

friends and neighbors.  She is normally active and still works at [employer]. 

since recent s was set up with [facility]- home care had first visist 

today.. has no hospital equipment.


Smoking Status: Never smoker


Past Alcohol Use History: None Reported


Past Drug Use History: None Reported





- Past Family History


  ** Father


Family Medical History: Cancer, Congestive Heart Failure (CHF)


Additional Family Medical History / Comment(s): Father had rheumatic fever as a 

child.  He  in his 60s.





  ** Mother


Family Medical History: Congestive Heart Failure (CHF)





Medications and Allergies


 Home Medications











 Medication  Instructions  Recorded  Confirmed  Type


 


Escitalopram [Lexapro] 10 mg PO HS 05/05/15 05/06/18 History


 


Cholecalciferol [Vitamin D3] 2,000 unit PO DAILY 16 History


 


Multivit-Min/Iron/Folic/Lutein 1 tab PO DAILY 17 History





[Centrum Silver Women Tablet]    


 


Ascorbic Acid [Vitamin C] 500 mg PO DAILY 17 History


 


Calcium Carbonate [Calcium] 1,200 mg PO DAILY 17 History


 


Folic Acid 1 mg PO DAILY@1200 #30 tab 04/10/17 05/06/18 Rx


 


Metoprolol Tartrate [Lopressor] 25 mg PO BID #60 tab 04/10/17 05/06/18 Rx


 


Thiamine [Vitamin B-1] 100 mg PO DAILY@1200 #30 tab 04/10/17 05/06/18 Rx


 


Ibuprofen [Motrin] 600 mg PO Q6HR PRN #20 tab 18 Rx











 Allergies











Allergy/AdvReac Type Severity Reaction Status Date / Time


 


aspirin Allergy  Unknown Verified 18 14:01














Physical Exam


Vitals: 


 Vital Signs











  Temp Pulse Resp BP Pulse Ox


 


 18 07:15  97.0 F L  70  18  125/77  98


 


 18 18:00   73  18  


 


 18 17:14     164/70 








 Intake and Output











 18





 06:59 14:59 22:59


 


Other:   


 


  # Voids 1  














- Constitutional


General appearance: cooperative, no acute distress





- EENT


Eyes: EOMI, dentition normal


ENT: NA/AT, normal oropharynx


Ears: bilateral: normal





- Neck


Patient has jugular venous distention 





Neck: no lymphadenopathy, normal ROM


Thyroid: bilateral: normal size





- Respiratory


Respiratory: left: dullness (Lateral basis,), bilateral: diminished





- Cardiovascular


Rhythm: regular


Heart sounds: normal: S1, S2


  ** ankle


Peripheral Edema: bilateral: Trace





  ** foot


Peripheral Edema: bilateral: Trace


  ** dorsalis pedis


Peripheral Pulses: bilateral: Normal





  ** radial pulse


Peripheral Pulses: bilateral: Normal





- Gastrointestinal


General gastrointestinal: no organomegaly, soft, no tenderness





- Integumentary


Integumentary: normal turgor





- Neurologic


Neurologic: CNII-XII intact





Results





- Laboratory Findings


CBC and BMP: 


 18 07:30





 18 07:30


PT/INR, D-dimer











PT  11.1 sec (9.0-12.0)   18  12:00    


 


INR  1.2  (<1.2)  H  18  12:00    


 


D-Dimer  1.94 mg/L FEU (<0.60)  H  18  12:00    








Abnormal lab findings: 


 Abnormal Labs











  18





  12:00 12:00 12:00


 


RBC   2.95 L 


 


Hgb   8.9 L 


 


Hct   27.1 L 


 


RDW   18.3 H 


 


Plt Count   


 


Eosinophils #   


 


INR   


 


D-Dimer   


 


Sodium    146 H


 


Chloride    108 H


 


Creatinine    1.41 H


 


Glucose    129 H


 


Calcium   


 


Total Creatine Kinase  142 H  


 


Total Protein    6.1 L


 


Albumin    3.4 L














  18





  12:00 15:08 20:57


 


RBC   2.90 L  2.99 L


 


Hgb   8.7 L  8.8 L


 


Hct   27.3 L  27.7 L


 


RDW   17.8 H  17.7 H


 


Plt Count   146 L  141 L


 


Eosinophils #    1.0 H


 


INR  1.2 H  


 


D-Dimer  1.94 H  


 


Sodium   


 


Chloride   


 


Creatinine   


 


Glucose   


 


Calcium   


 


Total Creatine Kinase   


 


Total Protein   


 


Albumin   














  18





  07:30 07:30


 


RBC  2.77 L 


 


Hgb  8.4 L 


 


Hct  26.5 L 


 


RDW  18.4 H 


 


Plt Count  130 L 


 


Eosinophils #  1.0 H 


 


INR  


 


D-Dimer  


 


Sodium  


 


Chloride   109 H


 


Creatinine   1.27 H


 


Glucose  


 


Calcium   8.0 L


 


Total Creatine Kinase  


 


Total Protein  


 


Albumin  














- Diagnostic Findings


Chest x-ray: report reviewed, image reviewed


CT scan - chest: report reviewed, image reviewed





Assessment and Plan


Plan: 


Assessment:





#1.  Small bilateral pleural effusions, left greater than the right. US chest 

showed a left pleural fluid pocket of 6 cm, and right pleural fluid pocket of 

1.5 cm.





#2.  Acute kidney injury secondary to dehydration.  Patient received outpatient 

IV hydration therapy 





#3.  Ovarian cancer, status post total abdominal hysterectomy with salpingo-

oophorectomy, he recently began chemotherapy treatment with Almta





#4.  History of colon cancer, is post colectomy





#5.  Constipation, and abdominal distention





#6.  History of diastolic heart failure and moderate aortic regurgitation with 

mild aortic stenosis and moderate pulmonary hypertension





#7.  History of GERD/reflux





#8.  Hypertension





#9.  Anxiety





#10.  Osteoporosis





Plan:





Patient is left pleural effusion is small, and does not require draining at 

this time.  The patient is improving, and is stable.  Renal profile is improving

, patient is complaining of ongoing constipation, and this is being addressed 

attending physician





I performed a history & physical examination of the patient and discussed their 

management with my nurse practitioner, Suzette Quijano.  I reviewed the nurse 

practitioner's note and agree with the documented findings and plan of care.  

Lung sounds are diminished, with dullness posterior lower lobe.  The findings 

and the impression was discussed with the patient.  I attest to the 

documentation by the nurse practitioner. 





Time with Patient: Greater than 30

## 2018-05-08 VITALS — HEART RATE: 87 BPM | SYSTOLIC BLOOD PRESSURE: 140 MMHG | DIASTOLIC BLOOD PRESSURE: 87 MMHG

## 2018-05-08 VITALS — RESPIRATION RATE: 16 BRPM | TEMPERATURE: 97.9 F

## 2018-05-08 RX ADMIN — CALCIUM CARBONATE (ANTACID) CHEW TAB 500 MG SCH MG: 500 CHEW TAB at 11:30

## 2018-05-08 RX ADMIN — FOLIC ACID SCH MG: 1 TABLET ORAL at 11:30

## 2018-05-08 RX ADMIN — METOPROLOL TARTRATE SCH MG: 25 TABLET, FILM COATED ORAL at 09:35

## 2018-05-08 RX ADMIN — OXYCODONE HYDROCHLORIDE AND ACETAMINOPHEN SCH MG: 500 TABLET ORAL at 09:34

## 2018-05-08 RX ADMIN — Medication SCH MG: at 11:30

## 2018-05-08 RX ADMIN — Medication SCH UNIT: at 09:34

## 2018-05-08 RX ADMIN — TOBRAMYCIN SCH APPLIC: 3 OINTMENT OPHTHALMIC at 09:34

## 2018-05-08 RX ADMIN — POLYETHYLENE GLYCOL 3350 SCH GM: 17 POWDER, FOR SOLUTION ORAL at 09:35

## 2018-05-08 RX ADMIN — ACETAMINOPHEN PRN MG: 325 TABLET, FILM COATED ORAL at 04:34

## 2018-05-08 RX ADMIN — DOCUSATE SODIUM SCH MG: 100 CAPSULE, LIQUID FILLED ORAL at 09:35

## 2018-05-08 NOTE — DS
DISCHARGE SUMMARY



FINAL DIAGNOSES:

1. Shortness of breath secondary to left pleural effusion.

2. Ovarian cancer.

3. Acute on chronic anemia.

4. Constipation.

5. History of colon cancer status post colectomy.

6. Chronic obstructive pulmonary disease.

7. Gastroesophageal reflux disease.



DISCHARGE CONDITION:

The patient is being discharged in stable condition with guarded prognosis. Total time

taken 35 minutes.



HISTORY OF PRESENT ILLNESS:

This 78-year-old woman with a past medical history of multiple medical problems was

admitted with dyspnea.  The patient was treated symptomatically. Pulmonary evaluated

the patient and thoracocentesis was deferred at this time.  The patient improved

significantly.

On exam, vital signs are stable. Cardiovascular S1 and S2. Respirations reveal a few

rhonchi.



DISCHARGE INSTRUCTIONS:

1. Diet is cardiac.

2. Activity is limited.



FOLLOWUP:

1. Follow up with Dr. Simmons in 2 to 3 days.

2. Follow up with Pulmonology as advised.



MEDICATIONS:

1. Tylenol 650 every 6 hours p.r.n.

2. Vitamin C 500 mg p.o. daily.

3. Calcium 200 mg p.o. daily.

4. D3, 2000 daily.

5. Lexapro 10 mg at bedtime.

6. Folic acid 1 mg p.o. daily.

7. Lasix 10 mg p.o. daily for 1 week.

8. Motrin 600 mg every 6 hours p.r.n.

9. Combivent 2 puffs q.i.d.

10.Lopressor 25 mg p.o. b.i.d.

11.Multivitamins.

12.Vitamin B1, 100 mg p.o. daily.

13.Tobramycin eye drops as recommended.





MMODL / IJN: [id number] / Job#: [id number]

## 2018-05-08 NOTE — P.PN
Subjective


Progress Note Date: 05/08/18


Principal diagnosis: 


Small bilateral pleural effusions, left greater than the right





Mrs. Avelar is a 78 -year-old white female patient of Dr. Lay who presented 

to emergency with increasing dyspnea, weakness.  Patient has a history of 

ovarian cancer status post total abdominal hysterectomy and bilateral salpingo-

oophorectomy and post systemic chemotherapy, colon cancer status post 

colectomy.  For the past week she has been receiving outpatient IV hydration 

therapy Monday through Friday and she states she has been increasingly more 

short of breath.  By Friday she felt she could hardly walk to her car, and on 

Saturday her daughter insisted her mom go to the emergency department for 

evaluation.  She denies any fever or chills, denies any cough.  Denied chest 

pain or palpitations.  She denies any lightheadedness, dizziness, or syncope.  

Denied any nausea vomiting or diarrhea.  She has been having problems with 

constipation, and has been having issues with increased abdominal distention.  

Chest x-ray showed COPD, cardiomegaly and an enlarging left-sided effusion.  

Chest CTA was done in regards to shortness of breath and an elevated d-dimer, 

and showed no evidence of pulmonary embolus, and bilateral pleural effusion, 

left greater than the right, and dependent atelectasis in the dependent 

portions of the lungs.  There was no evidence of axillary, internal mammary, or 

mediastinal or hilar adenopathy.  Ultrasound of the chest showed right pleural 

effusion pocket of 1.8 cm, and left pleural effusion pocket of 6.0 cm.  Patient 

has been afebrile, no evidence of leukocytosis, hemoglobin is 8.9.  BUN is 13, 

and creatinine was 1.41 on admission.  Cardiac enzymes and proBNP were within 

normal limits.  Patient has received IV hydration with 1 L of 0.9 normal saline

, and her renal profile has improved.  We were asked to see the patient in 

regards to the left pleural effusion.  Patient's last echocardiogram was on 04/ 06/2017 showed left ventricular systolic function within normal limits with an 

EF 55-60%, mild pulmonary hypertension with right ventricular systolic pressure 

of 42 mmHg. 





On 05/08/2018 patient seen in follow-up on oncology floor.  She is sitting up 

in the chair, denies any acute distress, currently on room air, with a pulse ox 

of 98%.  She states her breathing easier, today's exam reveal better air entry 

bilaterally, diminished breath sounds over left posterior lower lobe, clear on 

the right.  Respirations are even and nonlabored.  Patient is receiving IV 

diuretics, and her renal profile is improving.  Patient was able to have a 

bowel movement, and patient's abdominal distention is improved.  From pulmonary 

standpoint, patient's left pleural effusion is small, and does not require 

draining at this time.  We will follow that the patient on as-needed basis.











Objective





- Vital Signs


Vital signs: 


 Vital Signs











Temp  97.9 F   05/08/18 07:20


 


Pulse  64   05/08/18 07:20


 


Resp  16   05/08/18 07:20


 


BP  165/75   05/08/18 07:20


 


Pulse Ox  98   05/08/18 07:20








 Intake & Output











 05/07/18 05/08/18 05/08/18





 18:59 06:59 18:59


 


Intake Total  1240 


 


Balance  1240 


 


Intake:   


 


  Oral  1240 


 


Other:   


 


  # Voids 2 3 


 


  # Bowel Movements  1 














- Exam


Constitutional


General appearance: cooperative, no acute distress





- EENT


Eyes: EOMI, dentition normal


ENT: NA/AT, normal oropharynx


Ears: bilateral: normal





- Neck


Patient has jugular venous distention 





Neck: no lymphadenopathy, normal ROM


Thyroid: bilateral: normal size





- Respiratory


Respiratory: left: dullness (Lateral basis,), bilateral: diminished.  Lung 

sounds reveal better aeration bilaterally on today's exam





- Cardiovascular


Rhythm: regular


Heart sounds: normal: S1, S2


  ** ankle


Peripheral Edema: bilateral: Trace





  ** foot


Peripheral Edema: bilateral: Trace


  ** dorsalis pedis


Peripheral Pulses: bilateral: Normal





  ** radial pulse


Peripheral Pulses: bilateral: Normal





- Gastrointestinal


General gastrointestinal: no organomegaly, soft, no tenderness





- Integumentary


Integumentary: normal turgor





- Neurologic


Neurologic: CNII-XII intact








- Labs


CBC & Chem 7: 


 05/07/18 07:30





 05/07/18 07:30





Assessment and Plan


Plan: 


Assessment:





#1.  Small bilateral pleural effusions, left greater than the right. US chest 

showed a left pleural fluid pocket of 6 cm, and right pleural fluid pocket of 

1.5 cm.





#2.  Acute kidney injury secondary to dehydration.  Patient received outpatient 

IV hydration therapy 





#3.  Ovarian cancer, status post total abdominal hysterectomy with salpingo-

oophorectomy, he recently began chemotherapy treatment with Almta





#4.  History of colon cancer, is post colectomy





#5.  Constipation, and abdominal distention





#6.  History of diastolic heart failure and moderate aortic regurgitation with 

mild aortic stenosis and moderate pulmonary hypertension





#7.  History of GERD/reflux





#8.  Hypertension





#9.  Anxiety





#10.  Osteoporosis





Plan:





Patient remains stable, denies any dyspnea, denies any chest pain, currently on 

room air.  Renal profile is improving, patient was given a dose of IV Lasix and 

he is diuresing.  The left pleural effusion does not require draining at this 

time.  We will follow the with the patient on as-needed basis, thank you for 

this consultation.


I performed a history & physical examination of the patient and discussed their 

management with my nurse practitioner, Suzette Quijano.  I reviewed the nurse 

practitioner's note and agree with the documented findings and plan of care.  

Lung sounds are diminished, with dullness posterior lower lobe.  The findings 

and the impression was discussed with the patient.  I attest to the 

documentation by the nurse practitioner. 





Time with Patient: Less than 30

## 2018-05-08 NOTE — P.PN
Subjective


Progress Note Date: 05/07/18


Principal diagnosis: 





Shortness of breath and pleural effusion








Patient is a 78-year-old female with a known history of ovarian cancer 

currently undergoing chemotherapy, last on 04/09/2018 came to the hospital with 

the complaints of shortness of breath with past to 3 weeks which is gradually 

worsening.  Patient says that she also fell about 2 weeks ago.


Patient states over the last week she's becoming more more short of breath 

anytime she walks around she states she's even more short of breath.  Patient 

denies any recent fever chills or cough.  Patient denies any chest pain or 

palpitations.  Patient states she has been told she's been anemic .  Patient 

denies any abdominal pain patient denies nausea vomiting or diarrhea.  Patient 

denies headache patient denies numbness weakness.  Patient denies 

lightheadedness dizziness or near syncopal episode.





Patient was found have elevated d-dimer.  CT angiography of the chest showed no 

evidence of pulmonary embolism.


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  cardiomegaly.  Bilateral 

effusions larger on the left than the right.  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 

spleen





Hemoglobin 8.9 on admission.  Hemoglobin was 11 a month ago.





05/07/2018


Patient says that her breathing is better.  Complains of constipation and was 

started on stool softeners.


Ultrasound of THE CHEST SHOWED SMALL PLEURAL EFFUSION.  Pulmonary recommended 

no need for thoracentesis.  Patient was seen by oncology.


Hemoglobin is stable.  No fever no chills.  No nausea vomiting or abdominal 

pain.


Anticipate discharge tomorrow with more clinical improvement.





All other review of systems negative except the above





Active Medications











Generic Name Dose Route Start Last Admin





  Trade Name Geoffq  PRN Reason Stop Dose Admin


 


Acetaminophen  650 mg  05/07/18 00:57  05/07/18 21:55





  Tylenol Tab  PO   650 mg





  Q6HR PRN   Administration





  Fever and/ or Pain   


 


Ascorbic Acid  500 mg  05/07/18 09:00  05/07/18 07:52





  Vitamin C  PO   500 mg





  DAILY ANUPAM   Administration


 


Calcium Carbonate/Glycine  1,000 mg  05/06/18 18:48  05/07/18 13:05





  Tums  PO   1,000 mg





  1200 ANUPAM   Administration


 


Cholecalciferol  2,000 unit  05/07/18 09:00  05/07/18 07:52





  Vitamin D3  PO   2,000 unit





  DAILY ANUPAM   Administration


 


Docusate Sodium  100 mg  05/07/18 11:00  05/07/18 21:55





  Colace  PO   100 mg





  BID ANUPAM   Administration


 


Escitalopram Oxalate  10 mg  05/06/18 21:00  05/07/18 21:55





  Lexapro  PO   10 mg





  HS ANUPAM   Administration


 


Folic Acid  1 mg  05/07/18 12:00  05/07/18 13:06





  Folic Acid  PO   1 mg





  DAILY@1200 ANUPAM   Administration


 


Melatonin  5 mg  05/06/18 21:00  05/07/18 21:55





  Melatonin  PO   5 mg





  HS ANUPAM   Administration


 


Metoprolol Tartrate  25 mg  05/06/18 21:00  05/07/18 21:56





  Lopressor  PO   25 mg





  BID ANUPAM   Administration


 


Miscellaneous Information  1 each  05/06/18 12:44  05/06/18 13:24





  Rx Info: Iv Contrast Was Given  MISCELLANE  05/08/18 12:44  1 each





  DAILY PRN   Administration





  Per Protocol   


 


Multivitamins  1 each  05/07/18 12:00  05/07/18 13:06





  Theragran  PO   1 each





  DAILY@1200 ANUPAM   Administration


 


Polyethylene Glycol  17 gm  05/07/18 11:00  05/07/18 13:02





  Miralax  PO   17 gm





  DAILY ANUPAM   Administration


 


Thiamine HCl  100 mg  05/07/18 12:00  05/07/18 13:07





  Vitamin B-1  PO   100 mg





  DAILY@1200 ANUPAM   Administration


 


Tobramycin  1 applic  05/06/18 21:00  05/07/18 21:55





  Tobrex  LEFT EYE   1 applic





  BID ANUPAM   Administration














Objective





- Vital Signs


Vital signs: 


 Vital Signs











Temp  97.0 F L  05/07/18 07:15


 


Pulse  70   05/07/18 07:15


 


Resp  18   05/07/18 19:57


 


BP  125/77   05/07/18 07:15


 


Pulse Ox  98   05/07/18 07:15








 Intake & Output











 05/07/18 05/07/18 05/08/18





 06:59 18:59 06:59


 


Other:   


 


  # Voids 1 2 














- Exam





PHYSICAL EXAMINATION: 


Patient is lying in the bed comfortably, no acute distress, awake alert and 

oriented.. 


HEENT: Normocephalic. Neck is supple. Pupils reactive. Nostrils clear. Oral 

cavity is moist. Ears reveal no drainage. 


Neck reveals no JVD, carotid bruits, or thyromegaly. 


CHEST EXAMINATION: Trachea is central. Symmetrical expansion.  Left minimal a 

slight crackles.  Lung fields clear to auscultation and percussion. 


CARDIAC: Normal S1, S2 with no gallops. No murmurs 


ABDOMEN: Soft. Bowel sounds normal. No organomegaly. No abdominal bruits. 


Extremities: reveal no edema.  No clubbing or cyanosis


Neurologically awake, alert, oriented x3 with well-coordinated movements.  No 

focal deficits noted


Skin: No rash or skin lesions. 


Psychiatric: Coperative.  Nonsuicidal


Musculoskeletal: No joint swelling or deformity.  Normal range of motion.








- Labs


CBC & Chem 7: 


 05/07/18 07:30





 05/07/18 07:30


Labs: 


 Abnormal Lab Results - Last 24 Hours (Table)











  05/06/18 05/07/18 05/07/18 Range/Units





  20:57 07:30 07:30 


 


RBC  2.99 L  2.77 L   (3.80-5.40)  m/uL


 


Hgb  8.8 L  8.4 L   (11.4-16.0)  gm/dL


 


Hct  27.7 L  26.5 L   (34.0-46.0)  %


 


RDW  17.7 H  18.4 H   (11.5-15.5)  %


 


Plt Count  141 L  130 L   (150-450)  k/uL


 


Eosinophils #  1.0 H  1.0 H   (0-0.7)  k/uL


 


Chloride    109 H  ()  mmol/L


 


Creatinine    1.27 H  (0.52-1.04)  mg/dL


 


Calcium    8.0 L  (8.4-10.2)  mg/dL














Assessment and Plan


Assessment: 





Dyspnea secondary to left lung pleural effusion.  Ultrasound of the abdomen 

showed small effusion bilaterally.  No need for paracentesis as per pulmonary


Ovarian cancer with history of resection and recurrence.  Currently undergoing 

chemotherapy last on 04/09/2018


Acute on chronic anemia hemoglobin 8.9 on admission. was 11 previously.  Likely 

due to chemotherapy


Constipation


Colon cancer status post colectomy


COPD


GERD/peptic ulcer disease


Hypertension


Moderate aortic regurgitation with mild aortic stenosis and mild mitral 

regurgitation


Secondary pulmonary hypertension


Osteoarthritis


Anxiety and depression


DVT prophylaxis





Plan:


Patient will be continued on home medications.  Monitor H&H.  Started on stool 

softeners and laxatives.  Patient was seen by pulmonary and oncology.    

Prognosis is guarded with multiple medical problems and call conditions.


Anticipate discharge tomorrow with more clinical improvement.





Time with Patient: Greater than 30

## 2018-05-14 NOTE — CDI
Last Revision, December 2017



Documentation Clarification Form



Date: 5/14/18

From: Paloma Francois

Phone: [phone] Ally Salomon,  Hours-8:30 am & 5 pm -FMRN:
 [id number]

Admit Date: 5/6/2018 1:42:00 PM

Patient Name: Zuleika Avelar

Visit Number: [id number]

Discharge Date: 5/8/18



ATTENTION: The Clinical Documentation Specialists (CDI) and [facility] Coding Staff 
appreciate your assistance in clarifying documentation. Please respond to the 
clarification below the line at the bottom and electronically sign. The CDI & 
[facility] Coding staff will review the response and follow-up if needed. Please note: 
Queries are made part of the Legal Health Record. If you have any questions, 
please contact the author of this message via ITS.



Dr. Rolan Levine



The patient was diagnosed with shortness of breath secondary to left pleural 
effusion. She has history of ovarian malignancy and chronic diastolic heart 
failure. BNP 1710. She was given IV Lasix 40 mg once. 

Effusions too small to tap. 



In your professional opinion, can you please clarify the etiology of pleural 
effusion?



Pleural effusion etiology unknown

Malignant pleural effusion

Pleural effusion due to CHF (if chf is it acute on chronic CHF)

Other, please specify ________

Unable to determine



Please continue to document in your progress notes and discharge summary in 
order to capture severity of illness and risk of mortality. Include clinical 
findings that support your diagnosis.

__________________________________________

Pleural effusion etiology unknown
MTDD

## 2018-05-23 ENCOUNTER — HOSPITAL ENCOUNTER (OUTPATIENT)
Dept: HOSPITAL 47 - RADCTMAIN | Age: 78
Discharge: HOME | End: 2018-05-23
Payer: MEDICARE

## 2018-05-23 DIAGNOSIS — K43.9: ICD-10-CM

## 2018-05-23 DIAGNOSIS — K63.89: ICD-10-CM

## 2018-05-23 DIAGNOSIS — K21.9: ICD-10-CM

## 2018-05-23 DIAGNOSIS — C56.9: Primary | ICD-10-CM

## 2018-05-23 DIAGNOSIS — K44.9: ICD-10-CM

## 2018-05-23 DIAGNOSIS — J90: ICD-10-CM

## 2018-05-23 DIAGNOSIS — I71.2: ICD-10-CM

## 2018-05-23 LAB — BUN SERPL-SCNC: 23 MG/DL (ref 7–17)

## 2018-05-23 PROCEDURE — 84520 ASSAY OF UREA NITROGEN: CPT

## 2018-05-23 PROCEDURE — 71250 CT THORAX DX C-: CPT

## 2018-05-23 PROCEDURE — 74176 CT ABD & PELVIS W/O CONTRAST: CPT

## 2018-05-23 PROCEDURE — 36415 COLL VENOUS BLD VENIPUNCTURE: CPT

## 2018-05-23 PROCEDURE — 82565 ASSAY OF CREATININE: CPT

## 2018-05-23 NOTE — CT
EXAMINATION TYPE: CT ChestAbdPelvis wo con

 

DATE OF EXAM: 5/23/2018

 

INDICATION: Ovarian CA

 

COMPARISON: 2/5/2018

 

CT DLP: 372.6 mGycm

 

CONTRAST: 

Performed with Oral Contrast 

 

TECHNIQUE: Axial images at 5 mm thick sections.  Reconstructed images in the coronal plane.  Delayed 
images through the kidneys.  

 

FINDINGS:

 

CT CHEST:

 

Portion of the thyroid visualized is normal.

 

No suspicious lung nodules or focal infiltrates are present.

 

No enlarged mediastinal or hilar adenopathy is evident. 

 

The ascending aorta diameter at the level of the main pulmonary artery is 4.4 cm.  The main pulmonary
 artery diameter at the bifurcation is 3.0 cm. Coronary artery calcifications present.

 

Reflux into the distal esophagus is evident. There is a moderate size hiatal hernia is evident. Small
 left pleural effusion is present. This is an interval change.

 

CT ABDOMEN:

 

Liver: Normal

 

Spleen: Calcified granuloma within the spleen.

 

Pancreas: Normal

 

Adrenal glands: The adrenal glands are normal.

 

Gallbladder: Normal  

 

Kidneys: No masses are evident. No hydronephrosis is present.   No cysts are present.

 

Aorta: Vascular calcification is within the aorta. 

 

Inferior vena cava: Normal.

 

CT PELVIS: 

Wall thickening within the sigmoid colon and within small bowel loops are not excluded. There is an a
nterior abdominal wall hernia containing loops of bowel in the periumbilical region without evidence 
of obstruction. There are loops of bowel which are incompletely distended or lack oral contrast limit
ing their evaluation.

 

Appendix: Not visualized.

 

Urinary bladder: Decompressed with limited evaluation 

 

Genitourinary structures: Uterus and ovaries are not identified.

 

Osseous structures: No suspicious lytic or sclerotic lesions. There is some sclerosis at the symphysi
s pubis. Correlate for pubic symphysitis. Some degenerative changes are noted at the left sacroiliac 
joint. Degenerative disc changes are present L5-S1. Spondylolysis of L5 is present. Facet changes are
 present L4-5.

 

IMPRESSIONS:

1. Moderate-sized hiatal hernia.

2. Ascending thoracic aortic aneurysm of 4.4 cm at the level of main pulmonary artery.

3. Small left pleural effusion.

4. Gastroesophageal reflux into the distal esophagus.

5. Some wall thickening of the sigmoid colon and to a lesser degree within loops of bowel may be pres
ent.

7. Anterior abdominal wall hernia containing colon without evidence of complete obstruction.

## 2018-07-30 ENCOUNTER — HOSPITAL ENCOUNTER (OUTPATIENT)
Dept: HOSPITAL 47 - RADUSWWP | Age: 78
Discharge: HOME | End: 2018-07-30
Payer: MEDICARE

## 2018-07-30 DIAGNOSIS — N13.30: Primary | ICD-10-CM

## 2018-07-30 PROCEDURE — 76770 US EXAM ABDO BACK WALL COMP: CPT

## 2018-08-29 ENCOUNTER — HOSPITAL ENCOUNTER (OUTPATIENT)
Dept: HOSPITAL 47 - RADPROMAIN | Age: 78
Discharge: HOME | End: 2018-08-29
Attending: NURSE PRACTITIONER
Payer: MEDICARE

## 2018-08-29 VITALS — TEMPERATURE: 98.5 F | RESPIRATION RATE: 16 BRPM

## 2018-08-29 VITALS — SYSTOLIC BLOOD PRESSURE: 141 MMHG | HEART RATE: 82 BPM | DIASTOLIC BLOOD PRESSURE: 74 MMHG

## 2018-08-29 DIAGNOSIS — I10: ICD-10-CM

## 2018-08-29 DIAGNOSIS — Z79.899: ICD-10-CM

## 2018-08-29 DIAGNOSIS — J90: Primary | ICD-10-CM

## 2018-08-29 DIAGNOSIS — M19.90: ICD-10-CM

## 2018-08-29 LAB
INR PPP: 1.1 (ref ?–1.2)
PLATELET # BLD AUTO: 210 K/UL (ref 150–450)
PT BLD: 10.8 SEC (ref 9–12)

## 2018-08-29 PROCEDURE — 36415 COLL VENOUS BLD VENIPUNCTURE: CPT

## 2018-08-29 PROCEDURE — 85610 PROTHROMBIN TIME: CPT

## 2018-08-29 PROCEDURE — 32555 ASPIRATE PLEURA W/ IMAGING: CPT

## 2018-08-29 PROCEDURE — 71045 X-RAY EXAM CHEST 1 VIEW: CPT

## 2018-08-29 PROCEDURE — 85049 AUTOMATED PLATELET COUNT: CPT

## 2018-08-29 NOTE — XR
EXAMINATION TYPE: XR chest 1V

 

DATE OF EXAM: 8/29/2018

 

COMPARISON: NONE

 

HISTORY: Post left thoracentesis

 

TECHNIQUE: Single frontal view of the chest is obtained.

 

FINDINGS:  There is no evident pneumothorax. Blunting of the left costophrenic angle is present, some
 improved aeration is present due to prior exam. Heart size is likely stable, patient is rotated, the
re may be scoliosis.

 

IMPRESSION:  No evident complication status post thoracentesis.

## 2018-08-29 NOTE — US
EXAMINATION TYPE: US thoracentesis

 

DATE OF EXAM: 8/29/2018

 

COMPARISON: NONE

 

HISTORY: Pleural effusion.

 

FINDINGS: Maximal barrier technique was utilized.  The skin overlying a suitable pocket of fluid was 
localized and the overlying skin prepped and draped.  Lidocaine was used for local anesthesia. Ultras
ound was used with sterile technique.  A 5 Prydeinig catheter over guide needle was advanced into the pl
eural fluid collection using ultrasound guidance and the catheter advanced, needle removed, lack of r
eturn is noted, the catheter was removed and subsequently a 5 Prydeinig catheter over guide needle was r
eintroduced.  Approximately 0.6 liter(s) of serous fluid was removed.  Catheter was withdrawn and hem
ostasis achieved.  There is no immediate complication.  The patient discharged in stable condition wi
thout complication.

 

IMPRESSION: STATUS POST ULTRASOUND GUIDED THORACENTESIS, POST PROCEDURE CHEST X-RAY PENDING.  THIS MD
OCEDURE WAS PERFORMED BY THE UNDERSIGNED. Specimen sent for laboratory analysis.

## 2018-09-21 ENCOUNTER — HOSPITAL ENCOUNTER (OUTPATIENT)
Dept: HOSPITAL 47 - LABPAT | Age: 78
Discharge: HOME | End: 2018-09-21
Attending: UROLOGY
Payer: MEDICARE

## 2018-09-21 DIAGNOSIS — N13.30: ICD-10-CM

## 2018-09-21 DIAGNOSIS — Z79.01: ICD-10-CM

## 2018-09-21 DIAGNOSIS — Z01.812: Primary | ICD-10-CM

## 2018-09-21 DIAGNOSIS — R35.0: ICD-10-CM

## 2018-09-21 DIAGNOSIS — R31.29: ICD-10-CM

## 2018-09-21 DIAGNOSIS — I10: ICD-10-CM

## 2018-09-21 DIAGNOSIS — Z51.81: ICD-10-CM

## 2018-09-21 LAB
ANION GAP SERPL CALC-SCNC: 12 MMOL/L
BUN SERPL-SCNC: 43 MG/DL (ref 7–17)
CALCIUM SPEC-MCNC: 9.4 MG/DL (ref 8.4–10.2)
CELLS COUNTED: 100
CHLORIDE SERPL-SCNC: 105 MMOL/L (ref 98–107)
CO2 SERPL-SCNC: 23 MMOL/L (ref 22–30)
ERYTHROCYTE [DISTWIDTH] IN BLOOD BY AUTOMATED COUNT: 3.48 M/UL (ref 3.8–5.4)
ERYTHROCYTE [DISTWIDTH] IN BLOOD: 16.4 % (ref 11.5–15.5)
GLUCOSE SERPL-MCNC: 155 MG/DL (ref 74–99)
HCT VFR BLD AUTO: 33.6 % (ref 34–46)
HGB BLD-MCNC: 10.7 GM/DL (ref 11.4–16)
LYMPHOCYTES # BLD MANUAL: 1.12 K/UL (ref 1–4.8)
MCH RBC QN AUTO: 30.7 PG (ref 25–35)
MCHC RBC AUTO-ENTMCNC: 31.8 G/DL (ref 31–37)
MCV RBC AUTO: 96.4 FL (ref 80–100)
MONOCYTES # BLD MANUAL: 0.31 K/UL (ref 0–1)
NEUTROPHILS NFR BLD MANUAL: 86 %
NEUTS SEG # BLD MANUAL: 8.77 K/UL (ref 1.3–7.7)
PLATELET # BLD AUTO: 213 K/UL (ref 150–450)
POTASSIUM SERPL-SCNC: 4.9 MMOL/L (ref 3.5–5.1)
SODIUM SERPL-SCNC: 140 MMOL/L (ref 137–145)
WBC # BLD AUTO: 10.2 K/UL (ref 3.8–10.6)

## 2018-09-21 PROCEDURE — 87086 URINE CULTURE/COLONY COUNT: CPT

## 2018-09-21 PROCEDURE — 85025 COMPLETE CBC W/AUTO DIFF WBC: CPT

## 2018-09-21 PROCEDURE — 80048 BASIC METABOLIC PNL TOTAL CA: CPT

## 2018-09-21 PROCEDURE — 87077 CULTURE AEROBIC IDENTIFY: CPT

## 2018-09-21 PROCEDURE — 87186 SC STD MICRODIL/AGAR DIL: CPT

## 2018-09-26 ENCOUNTER — HOSPITAL ENCOUNTER (EMERGENCY)
Dept: HOSPITAL 47 - EC | Age: 78
Discharge: HOME | End: 2018-09-26
Payer: MEDICARE

## 2018-09-26 VITALS — DIASTOLIC BLOOD PRESSURE: 73 MMHG | SYSTOLIC BLOOD PRESSURE: 169 MMHG

## 2018-09-26 VITALS — TEMPERATURE: 98 F | HEART RATE: 70 BPM

## 2018-09-26 VITALS — RESPIRATION RATE: 18 BRPM

## 2018-09-26 DIAGNOSIS — Z79.52: ICD-10-CM

## 2018-09-26 DIAGNOSIS — Z90.722: ICD-10-CM

## 2018-09-26 DIAGNOSIS — Z92.21: ICD-10-CM

## 2018-09-26 DIAGNOSIS — Z90.49: ICD-10-CM

## 2018-09-26 DIAGNOSIS — Z85.43: ICD-10-CM

## 2018-09-26 DIAGNOSIS — Z90.710: ICD-10-CM

## 2018-09-26 DIAGNOSIS — Z90.79: ICD-10-CM

## 2018-09-26 DIAGNOSIS — R06.02: Primary | ICD-10-CM

## 2018-09-26 DIAGNOSIS — Z85.038: ICD-10-CM

## 2018-09-26 DIAGNOSIS — J90: ICD-10-CM

## 2018-09-26 DIAGNOSIS — Z88.6: ICD-10-CM

## 2018-09-26 DIAGNOSIS — I50.9: ICD-10-CM

## 2018-09-26 DIAGNOSIS — M19.90: ICD-10-CM

## 2018-09-26 DIAGNOSIS — F41.9: ICD-10-CM

## 2018-09-26 DIAGNOSIS — I11.0: ICD-10-CM

## 2018-09-26 DIAGNOSIS — R05: ICD-10-CM

## 2018-09-26 LAB
ALBUMIN SERPL-MCNC: 4 G/DL (ref 3.5–5)
ALP SERPL-CCNC: 56 U/L (ref 38–126)
ALT SERPL-CCNC: 29 U/L (ref 9–52)
ANION GAP SERPL CALC-SCNC: 11 MMOL/L
APTT BLD: 20.5 SEC (ref 22–30)
AST SERPL-CCNC: 29 U/L (ref 14–36)
BASOPHILS # BLD AUTO: 0 K/UL (ref 0–0.2)
BASOPHILS NFR BLD AUTO: 0 %
BUN SERPL-SCNC: 53 MG/DL (ref 7–17)
CALCIUM SPEC-MCNC: 9.6 MG/DL (ref 8.4–10.2)
CHLORIDE SERPL-SCNC: 102 MMOL/L (ref 98–107)
CK SERPL-CCNC: 33 U/L (ref 30–135)
CO2 SERPL-SCNC: 25 MMOL/L (ref 22–30)
EOSINOPHIL # BLD AUTO: 0 K/UL (ref 0–0.7)
EOSINOPHIL NFR BLD AUTO: 0 %
ERYTHROCYTE [DISTWIDTH] IN BLOOD BY AUTOMATED COUNT: 3.63 M/UL (ref 3.8–5.4)
ERYTHROCYTE [DISTWIDTH] IN BLOOD: 16.4 % (ref 11.5–15.5)
GLUCOSE SERPL-MCNC: 156 MG/DL (ref 74–99)
HCT VFR BLD AUTO: 35.2 % (ref 34–46)
HGB BLD-MCNC: 11.1 GM/DL (ref 11.4–16)
INR PPP: 1.1 (ref ?–1.2)
LYMPHOCYTES # SPEC AUTO: 0.8 K/UL (ref 1–4.8)
LYMPHOCYTES NFR SPEC AUTO: 8 %
MCH RBC QN AUTO: 30.5 PG (ref 25–35)
MCHC RBC AUTO-ENTMCNC: 31.4 G/DL (ref 31–37)
MCV RBC AUTO: 97 FL (ref 80–100)
MONOCYTES # BLD AUTO: 0.8 K/UL (ref 0–1)
MONOCYTES NFR BLD AUTO: 7 %
NEUTROPHILS # BLD AUTO: 9.2 K/UL (ref 1.3–7.7)
NEUTROPHILS NFR BLD AUTO: 85 %
PLATELET # BLD AUTO: 195 K/UL (ref 150–450)
POTASSIUM SERPL-SCNC: 4.7 MMOL/L (ref 3.5–5.1)
PROT SERPL-MCNC: 7.1 G/DL (ref 6.3–8.2)
PT BLD: 10.4 SEC (ref 9–12)
SODIUM SERPL-SCNC: 138 MMOL/L (ref 137–145)
TROPONIN I SERPL-MCNC: <0.012 NG/ML (ref 0–0.03)
WBC # BLD AUTO: 10.8 K/UL (ref 3.8–10.6)

## 2018-09-26 PROCEDURE — 82553 CREATINE MB FRACTION: CPT

## 2018-09-26 PROCEDURE — 84484 ASSAY OF TROPONIN QUANT: CPT

## 2018-09-26 PROCEDURE — 82550 ASSAY OF CK (CPK): CPT

## 2018-09-26 PROCEDURE — 85610 PROTHROMBIN TIME: CPT

## 2018-09-26 PROCEDURE — 85730 THROMBOPLASTIN TIME PARTIAL: CPT

## 2018-09-26 PROCEDURE — 71046 X-RAY EXAM CHEST 2 VIEWS: CPT

## 2018-09-26 PROCEDURE — 99285 EMERGENCY DEPT VISIT HI MDM: CPT

## 2018-09-26 PROCEDURE — 85025 COMPLETE CBC W/AUTO DIFF WBC: CPT

## 2018-09-26 PROCEDURE — 78582 LUNG VENTILAT&PERFUS IMAGING: CPT

## 2018-09-26 PROCEDURE — 83880 ASSAY OF NATRIURETIC PEPTIDE: CPT

## 2018-09-26 PROCEDURE — 36415 COLL VENOUS BLD VENIPUNCTURE: CPT

## 2018-09-26 PROCEDURE — 80053 COMPREHEN METABOLIC PANEL: CPT

## 2018-09-26 NOTE — ED
General Adult HPI





- General


Chief complaint: Shortness of Breath


Stated complaint: dyspnea


Time Seen by Provider: 18 13:50


Source: patient, RN notes reviewed


Mode of arrival: wheelchair


Limitations: no limitations





- History of Present Illness


Initial comments: 





Patient is a pleasant 78-year-old female presenting to the emergency department 

with exertional dyspnea.  Symptoms have been present over the past week.  

Symptoms do seem to be progressing.  Patient is only able to walk a few feet 

before she becomes short of breath.  Patient may have had similar symptoms 

years ago associated with pneumonia.  Patient does have a history of ovarian 

cancer and is on Taxol therapy.  No leg pain or leg swelling.  Patient does 

have minimal cough that is not far from baseline.  No fevers .





- Related Data


 Home Medications











 Medication  Instructions  Recorded  Confirmed


 


Escitalopram [Lexapro] 10 mg PO HS 05/05/15 09/26/18


 


Cholecalciferol [Vitamin D3] 2,000 unit PO DAILY 16


 


Multivit-Min/Iron/Folic/Lutein 1 tab PO HS 17





[Centrum Silver Women Tablet]   


 


Ascorbic Acid [Vitamin C] 1,000 mg PO DAILY 17


 


ALPRAZolam [Xanax] 0.25 mg PO TID PRN 18


 


Metoprolol Tartrate [Lopressor] 50 mg PO DAILY 18


 


Ondansetron HCl [Zofran] 8 mg PO TID PRN 18


 


amLODIPine BESYLATE [Norvasc] 5 mg PO DAILY 18


 


Dexamethasone 4 mg PO BID 18


 


amLODIPine [Norvasc] 2.5 mg PO HS 18


 


Folic Acid 2 mg PO DAILY 18











 Allergies











Allergy/AdvReac Type Severity Reaction Status Date / Time


 


aspirin Allergy  Unknown Verified 18 14:07














Review of Systems


ROS Statement: 


Those systems with pertinent positive or pertinent negative responses have been 

documented in the HPI.





ROS Other: All systems not noted in ROS Statement are negative.


Constitutional: Denies: fever


Eyes: Denies: eye pain


ENT: Denies: ear pain


Respiratory: Reports: dyspnea.  Denies: cough


Cardiovascular: Denies: chest pain


Endocrine: Reports: fatigue


Gastrointestinal: Denies: abdominal pain


Genitourinary: Denies: dysuria


Musculoskeletal: Denies: back pain


Skin: Denies: rash


Neurological: Denies: weakness





Past Medical History


Past Medical History: Cancer, Heart Failure, COPD, GERD/Reflux, Hypertension


Additional Past Medical History / Comment(s): Ovarian cancer status post total 

abdominal hysterectomy and bilateral salpingo-oophorectomy and status post 

systemic chemotherapy, colon cancer status post colectomy, peptic ulcer disease

, osteoporosis, moderate aortic regurgitation with mild aortic stenosis, mild 

mitral regurgitation and secondary pulmonary hypertension as evident on 

echocardiogram, chronic anxiety, shingles, osteoarthritis


History of Any Multi-Drug Resistant Organisms: None Reported


Past Surgical History: Hernia Repair, Hysterectomy


Additional Past Surgical History / Comment(s): ELVIA and BSO, colectomy, 

abdominal hernia repair


Past Anesthesia/Blood Transfusion Reactions: No Reported Reaction


Additional Past Anesthesia/Blood Transfusion Reaction / Comment(s): Pt has 

never recieved blood.


Past Psychological History: Anxiety


Smoking Status: Never smoker


Past Alcohol Use History: None Reported


Past Drug Use History: None Reported





- Past Family History


  ** Father


Family Medical History: Cancer, Congestive Heart Failure (CHF)


Additional Family Medical History / Comment(s): Father had rheumatic fever as a 

child.  He  in his 60s.





  ** Mother


Family Medical History: Congestive Heart Failure (CHF)





General Exam


Limitations: no limitations


General appearance: alert, in no apparent distress


Head exam: Present: atraumatic


Eye exam: Present: normal appearance, PERRL


ENT exam: Present: normal oropharynx


Neck exam: Present: normal inspection


Respiratory exam: Present: normal lung sounds bilaterally


Cardiovascular Exam: Present: regular rate, normal rhythm


  ** Expanded


Peripheral pulses: 2+: Radial (R), Radial (L), Dorsalis Pedis (R), Dorsalis 

Pedis (L)


GI/Abdominal exam: Present: soft.  Absent: tenderness


Extremities exam: Present: normal inspection.  Absent: pedal edema, calf 

tenderness


Neurological exam: Present: alert


Psychiatric exam: Present: normal affect, normal mood


Skin exam: Present: normal color





Course


 Vital Signs











  18





  13:29 14:55 16:29


 


Temperature 97.7 F  


 


Pulse Rate 70 79 69


 


Respiratory 18 18 18





Rate   


 


Blood Pressure 148/63 172/117 169/73


 


O2 Sat by Pulse 99 100 100





Oximetry   














EKG Findings





- EKG Comments:


EKG Findings:: Normal sinus rhythm 63.  .  QRS 78.  .  .  

Left axis.  Normal QRS.  No acute ST change.





Medical Decision Making





- Medical Decision Making





Patient reevaluated in bed.  Patient is updated on results.  Patient advised 

admission for further evaluation.  Patient is made aware of limitations of 

testing regarding troponin and VQ scan.  Patient is advised that she would 

benefit from medicine as well as cardiology consult.  Possible thoracentesis.  

Patient states she does not want to stay in the hospital and refuses admission.

  Patient does demonstrate medical decision making.  Patient is agreeable to 

close follow-up with her doctor.  Brother Is present.





- Lab Data


Result diagrams: 


 18 13:55





 18 13:55


 Lab Results











  18 Range/Units





  13:55 13:55 13:55 


 


WBC   10.8 H   (3.8-10.6)  k/uL


 


RBC   3.63 L   (3.80-5.40)  m/uL


 


Hgb   11.1 L   (11.4-16.0)  gm/dL


 


Hct   35.2   (34.0-46.0)  %


 


MCV   97.0   (80.0-100.0)  fL


 


MCH   30.5   (25.0-35.0)  pg


 


MCHC   31.4   (31.0-37.0)  g/dL


 


RDW   16.4 H   (11.5-15.5)  %


 


Plt Count   195   (150-450)  k/uL


 


Neutrophils %   85   %


 


Lymphocytes %   8   %


 


Monocytes %   7   %


 


Eosinophils %   0   %


 


Basophils %   0   %


 


Neutrophils #   9.2 H   (1.3-7.7)  k/uL


 


Lymphocytes #   0.8 L   (1.0-4.8)  k/uL


 


Monocytes #   0.8   (0-1.0)  k/uL


 


Eosinophils #   0.0   (0-0.7)  k/uL


 


Basophils #   0.0   (0-0.2)  k/uL


 


Hypochromasia   Slight   


 


Anisocytosis   Slight   


 


Macrocytosis   Slight   


 


PT     (9.0-12.0)  sec


 


INR     (<1.2)  


 


APTT     (22.0-30.0)  sec


 


Sodium    138  (137-145)  mmol/L


 


Potassium    4.7  (3.5-5.1)  mmol/L


 


Chloride    102  ()  mmol/L


 


Carbon Dioxide    25  (22-30)  mmol/L


 


Anion Gap    11  mmol/L


 


BUN    53 H  (7-17)  mg/dL


 


Creatinine    1.29 H  (0.52-1.04)  mg/dL


 


Est GFR (CKD-EPI)AfAm    47  (>60 ml/min/1.73 sqM)  


 


Est GFR (CKD-EPI)NonAf    40  (>60 ml/min/1.73 sqM)  


 


Glucose    156 H  (74-99)  mg/dL


 


Calcium    9.6  (8.4-10.2)  mg/dL


 


Total Bilirubin    0.8  (0.2-1.3)  mg/dL


 


AST    29  (14-36)  U/L


 


ALT    29  (9-52)  U/L


 


Alkaline Phosphatase    56  ()  U/L


 


Total Creatine Kinase  33    ()  U/L


 


CK-MB (CK-2)  1.7    (0.0-2.4)  ng/mL


 


CK-MB (CK-2) Rel Index  5.2    


 


Troponin I  <0.012    (0.000-0.034)  ng/mL


 


NT-Pro-B Natriuret Pep     pg/mL


 


Total Protein    7.1  (6.3-8.2)  g/dL


 


Albumin    4.0  (3.5-5.0)  g/dL














  18 Range/Units





  13:55 13:55 


 


WBC    (3.8-10.6)  k/uL


 


RBC    (3.80-5.40)  m/uL


 


Hgb    (11.4-16.0)  gm/dL


 


Hct    (34.0-46.0)  %


 


MCV    (80.0-100.0)  fL


 


MCH    (25.0-35.0)  pg


 


MCHC    (31.0-37.0)  g/dL


 


RDW    (11.5-15.5)  %


 


Plt Count    (150-450)  k/uL


 


Neutrophils %    %


 


Lymphocytes %    %


 


Monocytes %    %


 


Eosinophils %    %


 


Basophils %    %


 


Neutrophils #    (1.3-7.7)  k/uL


 


Lymphocytes #    (1.0-4.8)  k/uL


 


Monocytes #    (0-1.0)  k/uL


 


Eosinophils #    (0-0.7)  k/uL


 


Basophils #    (0-0.2)  k/uL


 


Hypochromasia    


 


Anisocytosis    


 


Macrocytosis    


 


PT   10.4  (9.0-12.0)  sec


 


INR   1.1  (<1.2)  


 


APTT   20.5 L  (22.0-30.0)  sec


 


Sodium    (137-145)  mmol/L


 


Potassium    (3.5-5.1)  mmol/L


 


Chloride    ()  mmol/L


 


Carbon Dioxide    (22-30)  mmol/L


 


Anion Gap    mmol/L


 


BUN    (7-17)  mg/dL


 


Creatinine    (0.52-1.04)  mg/dL


 


Est GFR (CKD-EPI)AfAm    (>60 ml/min/1.73 sqM)  


 


Est GFR (CKD-EPI)NonAf    (>60 ml/min/1.73 sqM)  


 


Glucose    (74-99)  mg/dL


 


Calcium    (8.4-10.2)  mg/dL


 


Total Bilirubin    (0.2-1.3)  mg/dL


 


AST    (14-36)  U/L


 


ALT    (9-52)  U/L


 


Alkaline Phosphatase    ()  U/L


 


Total Creatine Kinase    ()  U/L


 


CK-MB (CK-2)    (0.0-2.4)  ng/mL


 


CK-MB (CK-2) Rel Index    


 


Troponin I    (0.000-0.034)  ng/mL


 


NT-Pro-B Natriuret Pep  818   pg/mL


 


Total Protein    (6.3-8.2)  g/dL


 


Albumin    (3.5-5.0)  g/dL














- Radiology Data


Radiology results: report reviewed (VQ scan low probability), image reviewed (

Chest x-ray does show left-sided effusion)





Disposition


Clinical Impression: 


 Dyspnea





Disposition: HOME SELF-CARE


Instructions:  Dyspnea (ED)


Additional Instructions: 


Please follow-up with primary care physician Mary.  Please also follow-up 

with Dr. Vale.  Return for difficulty breathing, worsening or changing symptoms 

or other concerns


Is patient prescribed a controlled substance at d/c from ED?: No


Referrals: 


PINA Lay III, MD [Primary Care Provider] - 1-2 days


Time of Disposition: 17:05

## 2018-09-26 NOTE — P.CONS
History of Present Illness





- Reason for Consult


possible admission





- History of Present Illness


78-year-old present female came in with complaints of exertional dyspnea him in 

the few steps walking will make her dyspneic.  Patient was seen in Dr. Guevara's 

clinic who sent her here to rule out pulmonary embolism.  Patient at this point 

of time denied any chest pain denied any orthopnea proximal nocturnal dyspnea I 

have a few labs available at this point of time patient that creatinine is 1.26 

because of which we're unable to obtain a CAT scan to rule out PE.  Her 

creatinine is around her baseline.  VQ scan is being obtained at this time 

patient does not have any clinical signs or symptoms of congestive heart 

failure patient has a left-sided pleural effusion which appears to be chronic 

and from the cancer itself that is ovarian cancer for which patient is 

undergoing chemotherapy.  There is no significant worsening of that effusion.  

Patient BNP is not elevated does not have any elevated JVD.  Had a recent 

echocardiogram which showed normal ejection fraction.





Patient is not willing to stay in the hospital.  VQ scan is being obtained if 

that is negative and if patient is not willing to stay, probably can be 

discharged home to follow-up with PCP closely as an outpatient and if there is 

any significant abnormality was appreciated on her further testing and VQ scan 

will good and admit the patient.patient denied any fever chills dysuria nausea 

vomiting.








Review of Systems


REVIEW OF SYSTEMS: 


CONSTITUTIONAL: No fever, no malaise, no fatigue. 


HEENT: No recent visual problems or hearing problems. Denied any sore throat. 


CARDIOVASCULAR: No chest pain, orthopnea, PND, no palpitations, no syncope. 


PULMONARY: no cough, no hemoptysis. 


GASTROINTESTINAL: No diarrhea, no nausea, no vomiting, no abdominal pain. 

Normoactive bowel sounds. 


NEUROLOGICAL: No headaches, no weakness, no numbness. 


HEMATOLOGICAL: Denies any bleeding or petechiae. 


GENITOURINARY: Denies any burning micturition, frequency, or urgency. 


MUSCULOSKELETAL/RHEUMATOLOGICAL: Denies any joint pain, swelling, or any muscle 

pain. 


ENDOCRINE: Denies any polyuria or polydipsia. 





The rest of the 14-point review of systems is negative.











Past Medical History


Past Medical History: Cancer, Heart Failure, COPD, GERD/Reflux, Hypertension


Additional Past Medical History / Comment(s): Ovarian cancer status post total 

abdominal hysterectomy and bilateral salpingo-oophorectomy and status post 

systemic chemotherapy, colon cancer status post colectomy, peptic ulcer disease

, osteoporosis, moderate aortic regurgitation with mild aortic stenosis, mild 

mitral regurgitation and secondary pulmonary hypertension as evident on 

echocardiogram, chronic anxiety, shingles, osteoarthritis


History of Any Multi-Drug Resistant Organisms: None Reported


Past Surgical History: Hernia Repair, Hysterectomy


Additional Past Surgical History / Comment(s): ELVIA and BSO, colectomy, 

abdominal hernia repair


Past Anesthesia/Blood Transfusion Reactions: No Reported Reaction


Additional Past Anesthesia/Blood Transfusion Reaction / Comm: Pt has never 

recieved blood.


Past Psychological History: Anxiety


Smoking Status: Never smoker


Past Alcohol Use History: None Reported


Past Drug Use History: None Reported





- Past Family History


  ** Father


Family Medical History: Cancer, Congestive Heart Failure (CHF)


Additional Family Medical History / Comment(s): Father had rheumatic fever as a 

child.  He  in his 60s.





  ** Mother


Family Medical History: Congestive Heart Failure (CHF)





Medications and Allergies


 Home Medications











 Medication  Instructions  Recorded  Confirmed  Type


 


Escitalopram [Lexapro] 10 mg PO HS 05/05/15 09/26/18 History


 


Cholecalciferol [Vitamin D3] 2,000 unit PO DAILY 16 History


 


Multivit-Min/Iron/Folic/Lutein 1 tab PO HS 17 History





[Centrum Silver Women Tablet]    


 


Ascorbic Acid [Vitamin C] 1,000 mg PO DAILY 17 History


 


ALPRAZolam [Xanax] 0.25 mg PO TID PRN 18 History


 


Metoprolol Tartrate [Lopressor] 50 mg PO DAILY 18 History


 


Ondansetron HCl [Zofran] 8 mg PO TID PRN 18 History


 


amLODIPine BESYLATE [Norvasc] 5 mg PO DAILY 18 History


 


Dexamethasone 4 mg PO BID 18 History


 


amLODIPine [Norvasc] 2.5 mg PO HS 18 History


 


Folic Acid 2 mg PO DAILY 18 History











 Allergies











Allergy/AdvReac Type Severity Reaction Status Date / Time


 


aspirin Allergy  Unknown Verified 18 14:07














Physical Exam


Vitals: 


 Vital Signs











  Temp Pulse Resp BP Pulse Ox


 


 18 14:55   79  18  172/117  100


 


 18 13:29  97.7 F  70  18  148/63  99








 Intake and Output











 18





 06:59 14:59 22:59


 


Other:   


 


  Weight  58.06 kg 











PHYSICAL EXAMINATION: 





GENERAL: The patient is alert and oriented x3, not in any acute distress. Well 

developed, well nourished. 


HEENT: Pupils are round and equally reacting to light. EOMI. No scleral 

icterus. No conjunctival pallor. Normocephalic, atraumatic. No pharyngeal 

erythema. No thyromegaly. 


CARDIOVASCULAR: S1 and S2 present. No murmurs, rubs, or gallops. 


PULMONARY: Chest is clear to auscultation, no wheezing or crackles. 


ABDOMEN: Soft, nontender, nondistended, normoactive bowel sounds. No palpable 

organomegaly. 


MUSCULOSKELETAL: No joint swelling or deformity.


EXTREMITIES: No cyanosis, clubbing, or pedal edema. 


NEUROLOGICAL: Gross neurological examination did not reveal any focal deficits. 


SKIN: No rashes. 











Results


CBC & Chem 7: 


 18 13:55





 18 13:55


Labs: 


 Abnormal Lab Results - Last 24 Hours (Table)











  18 Range/Units





  13:55 13:55 


 


WBC  10.8 H   (3.8-10.6)  k/uL


 


RBC  3.63 L   (3.80-5.40)  m/uL


 


Hgb  11.1 L   (11.4-16.0)  gm/dL


 


RDW  16.4 H   (11.5-15.5)  %


 


Neutrophils #  9.2 H   (1.3-7.7)  k/uL


 


Lymphocytes #  0.8 L   (1.0-4.8)  k/uL


 


BUN   53 H  (7-17)  mg/dL


 


Creatinine   1.29 H  (0.52-1.04)  mg/dL


 


Glucose   156 H  (74-99)  mg/dL














Assessment and Plan


Plan: 


-shortness of breath and lethargy: May be because of chemotherapy itself.  Rule 

out pulmonary embolism if patient has PE on VQ scan we'll admit the 

patient.there is no pneumonic process patient is a chronic pleural effusion on 

the left side do not believe that is contributing to her shortness of breath.


-overweight in cancer undergoing chemotherapy at this time


-COPD without any acute exacerbation 


gastroesophageall reflux disease


-Hypertension next





The patient is admitted will be resumed on appropriate home medications for 

above-mentioned chronic medical problems

## 2018-09-26 NOTE — NM
EXAMINATION TYPE: NM pul vent and perfuse

 

DATE OF EXAM: 9/26/2018

 

COMPARISON: Radiograph same day

 

HISTORY: 78-year-old female with shortness of breath, dyspnea, elevated creatinine

 

TECHNIQUE:  Utilizing inhalation of 31.4 mCi Tc 99m DTPA aerosol and intravenous injection of 5.1 mCi
 of Tc 99m MAA, ventilation and perfusion images are acquired post injection in multiple projections.


 

FINDINGS: 

Defect at the left base corresponds to the patient's is left pleural effusion. Some clumping of inhal
ed tracer within the central airways can be seen with COPD. There is no mismatched perfusion defect. 
There

 

IMPRESSION: 

Matched left basilar defect corresponds to the patient's left pleural effusion. There is no mismatche
d perfusion defect. Low probability for pulmonary embolus.

## 2018-09-26 NOTE — XR
EXAMINATION TYPE: XR chest 2V

 

DATE OF EXAM: 9/26/2018

 

COMPARISON: Prior chest x-ray 8/29/2018 and CT 5/23/2018

 

HISTORY: Difficulty breathing, shortness of breath

 

TECHNIQUE:  Frontal and lateral views of the chest are obtained.

 

FINDINGS:  There is retrocardiac density, obscured left hemidiaphragm and blunting of the left costop
hrenic angle. Spinal curvature is suspected. No evident pneumothorax. Retrocardiac density with centr
al lucency is noted. Heart is enlarged. Prominent lung volumes compatible with COPD. Aorta is aneurys
mal.

 

IMPRESSION: Cardiomegaly. Hiatal hernia, partial fixed intrathoracic stomach. Left lower lobe atelect
asis versus pneumonia and associated effusion, correlate. Aortic aneurysm.

## 2018-10-09 ENCOUNTER — HOSPITAL ENCOUNTER (INPATIENT)
Dept: HOSPITAL 47 - EC | Age: 78
LOS: 3 days | Discharge: HOME | DRG: 871 | End: 2018-10-12
Attending: HOSPITALIST | Admitting: HOSPITALIST
Payer: MEDICARE

## 2018-10-09 DIAGNOSIS — K27.9: ICD-10-CM

## 2018-10-09 DIAGNOSIS — E87.2: ICD-10-CM

## 2018-10-09 DIAGNOSIS — Z85.038: ICD-10-CM

## 2018-10-09 DIAGNOSIS — E83.42: ICD-10-CM

## 2018-10-09 DIAGNOSIS — J44.0: ICD-10-CM

## 2018-10-09 DIAGNOSIS — J91.0: ICD-10-CM

## 2018-10-09 DIAGNOSIS — D63.0: ICD-10-CM

## 2018-10-09 DIAGNOSIS — A41.50: Primary | ICD-10-CM

## 2018-10-09 DIAGNOSIS — I11.0: ICD-10-CM

## 2018-10-09 DIAGNOSIS — D64.81: ICD-10-CM

## 2018-10-09 DIAGNOSIS — K46.9: ICD-10-CM
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
## 2018-10-09 DIAGNOSIS — I27.29: ICD-10-CM

## 2018-10-09 DIAGNOSIS — Z87.11: ICD-10-CM

## 2018-10-09 DIAGNOSIS — N39.0: ICD-10-CM

## 2018-10-09 DIAGNOSIS — E87.1: ICD-10-CM

## 2018-10-09 DIAGNOSIS — K21.9: ICD-10-CM

## 2018-10-09 DIAGNOSIS — F41.9: ICD-10-CM

## 2018-10-09 DIAGNOSIS — T45.1X5A: ICD-10-CM

## 2018-10-09 DIAGNOSIS — Z79.899: ICD-10-CM

## 2018-10-09 DIAGNOSIS — M81.0: ICD-10-CM

## 2018-10-09 DIAGNOSIS — Z82.49: ICD-10-CM

## 2018-10-09 DIAGNOSIS — Z90.710: ICD-10-CM

## 2018-10-09 DIAGNOSIS — R18.8: ICD-10-CM

## 2018-10-09 DIAGNOSIS — J18.9: ICD-10-CM

## 2018-10-09 DIAGNOSIS — Z90.49: ICD-10-CM

## 2018-10-09 DIAGNOSIS — I50.42: ICD-10-CM

## 2018-10-09 DIAGNOSIS — J98.11: ICD-10-CM

## 2018-10-09 DIAGNOSIS — N17.9: ICD-10-CM

## 2018-10-09 DIAGNOSIS — I08.0: ICD-10-CM

## 2018-10-09 DIAGNOSIS — C56.9: ICD-10-CM

## 2018-10-09 DIAGNOSIS — M19.90: ICD-10-CM

## 2018-10-09 LAB
ALBUMIN SERPL-MCNC: 3.2 G/DL (ref 3.5–5)
ALP SERPL-CCNC: 52 U/L (ref 38–126)
ALT SERPL-CCNC: 42 U/L (ref 9–52)
ANION GAP SERPL CALC-SCNC: 10 MMOL/L
APTT BLD: 20.3 SEC (ref 22–30)
AST SERPL-CCNC: 30 U/L (ref 14–36)
BASOPHILS # BLD AUTO: 0 K/UL (ref 0–0.2)
BASOPHILS NFR BLD AUTO: 0 %
BUN SERPL-SCNC: 44 MG/DL (ref 7–17)
CALCIUM SPEC-MCNC: 8.8 MG/DL (ref 8.4–10.2)
CHLORIDE SERPL-SCNC: 103 MMOL/L (ref 98–107)
CO2 SERPL-SCNC: 21 MMOL/L (ref 22–30)
EOSINOPHIL # BLD AUTO: 0 K/UL (ref 0–0.7)
EOSINOPHIL NFR BLD AUTO: 0 %
ERYTHROCYTE [DISTWIDTH] IN BLOOD BY AUTOMATED COUNT: 3.08 M/UL (ref 3.8–5.4)
ERYTHROCYTE [DISTWIDTH] IN BLOOD: 16.3 % (ref 11.5–15.5)
GLUCOSE SERPL-MCNC: 158 MG/DL (ref 74–99)
GLUCOSE UR QL: (no result)
HCT VFR BLD AUTO: 27.9 % (ref 34–46)
HGB BLD-MCNC: 9.5 GM/DL (ref 11.4–16)
HYALINE CASTS UR QL AUTO: 1 /LPF (ref 0–2)
INR PPP: 1.2 (ref ?–1.2)
LYMPHOCYTES # SPEC AUTO: 0.5 K/UL (ref 1–4.8)
LYMPHOCYTES NFR SPEC AUTO: 3 %
MAGNESIUM SPEC-SCNC: 1.2 MG/DL (ref 1.6–2.3)
MCH RBC QN AUTO: 30.8 PG (ref 25–35)
MCHC RBC AUTO-ENTMCNC: 33.9 G/DL (ref 31–37)
MCV RBC AUTO: 90.7 FL (ref 80–100)
MONOCYTES # BLD AUTO: 0.8 K/UL (ref 0–1)
MONOCYTES NFR BLD AUTO: 4 %
NEUTROPHILS # BLD AUTO: 16.8 K/UL (ref 1.3–7.7)
NEUTROPHILS NFR BLD AUTO: 93 %
PH UR: 6 [PH] (ref 5–8)
PLATELET # BLD AUTO: 146 K/UL (ref 150–450)
POTASSIUM SERPL-SCNC: 4.4 MMOL/L (ref 3.5–5.1)
PROT SERPL-MCNC: 5.7 G/DL (ref 6.3–8.2)
PROT UR QL: (no result)
PT BLD: 11.2 SEC (ref 9–12)
RBC UR QL: 3 /HPF (ref 0–5)
SODIUM SERPL-SCNC: 134 MMOL/L (ref 137–145)
SP GR UR: 1.02 (ref 1–1.03)
SQUAMOUS UR QL AUTO: 3 /HPF (ref 0–4)
UROBILINOGEN UR QL STRIP: <2 MG/DL (ref ?–2)
WBC # BLD AUTO: 18.2 K/UL (ref 3.8–10.6)
WBC #/AREA URNS HPF: 46 /HPF (ref 0–5)

## 2018-10-09 PROCEDURE — 71046 X-RAY EXAM CHEST 2 VIEWS: CPT

## 2018-10-09 PROCEDURE — 96366 THER/PROPH/DIAG IV INF ADDON: CPT

## 2018-10-09 PROCEDURE — 76604 US EXAM CHEST: CPT

## 2018-10-09 PROCEDURE — 99285 EMERGENCY DEPT VISIT HI MDM: CPT

## 2018-10-09 PROCEDURE — 80202 ASSAY OF VANCOMYCIN: CPT

## 2018-10-09 PROCEDURE — 83735 ASSAY OF MAGNESIUM: CPT

## 2018-10-09 PROCEDURE — 87040 BLOOD CULTURE FOR BACTERIA: CPT

## 2018-10-09 PROCEDURE — 83605 ASSAY OF LACTIC ACID: CPT

## 2018-10-09 PROCEDURE — 83880 ASSAY OF NATRIURETIC PEPTIDE: CPT

## 2018-10-09 PROCEDURE — 85610 PROTHROMBIN TIME: CPT

## 2018-10-09 PROCEDURE — 94640 AIRWAY INHALATION TREATMENT: CPT

## 2018-10-09 PROCEDURE — 85025 COMPLETE CBC W/AUTO DIFF WBC: CPT

## 2018-10-09 PROCEDURE — 80053 COMPREHEN METABOLIC PANEL: CPT

## 2018-10-09 PROCEDURE — 87086 URINE CULTURE/COLONY COUNT: CPT

## 2018-10-09 PROCEDURE — 94760 N-INVAS EAR/PLS OXIMETRY 1: CPT

## 2018-10-09 PROCEDURE — 96368 THER/DIAG CONCURRENT INF: CPT

## 2018-10-09 PROCEDURE — 36415 COLL VENOUS BLD VENIPUNCTURE: CPT

## 2018-10-09 PROCEDURE — 85730 THROMBOPLASTIN TIME PARTIAL: CPT

## 2018-10-09 PROCEDURE — 93005 ELECTROCARDIOGRAM TRACING: CPT

## 2018-10-09 PROCEDURE — 96365 THER/PROPH/DIAG IV INF INIT: CPT

## 2018-10-09 PROCEDURE — 81001 URINALYSIS AUTO W/SCOPE: CPT

## 2018-10-09 PROCEDURE — 80048 BASIC METABOLIC PNL TOTAL CA: CPT

## 2018-10-09 RX ADMIN — ESCITALOPRAM OXALATE SCH MG: 10 TABLET, FILM COATED ORAL at 21:22

## 2018-10-09 RX ADMIN — MAGNESIUM SULFATE IN DEXTROSE SCH MLS/HR: 10 INJECTION, SOLUTION INTRAVENOUS at 14:59

## 2018-10-09 RX ADMIN — HEPARIN SODIUM SCH UNIT: 5000 INJECTION, SOLUTION INTRAVENOUS; SUBCUTANEOUS at 21:18

## 2018-10-09 RX ADMIN — IPRATROPIUM BROMIDE AND ALBUTEROL SULFATE SCH ML: .5; 3 SOLUTION RESPIRATORY (INHALATION) at 20:25

## 2018-10-09 RX ADMIN — THERA TABS SCH EACH: TAB at 21:18

## 2018-10-09 RX ADMIN — MAGNESIUM SULFATE IN DEXTROSE SCH MLS/HR: 10 INJECTION, SOLUTION INTRAVENOUS at 16:24

## 2018-10-09 RX ADMIN — CEFAZOLIN SCH MLS/HR: 330 INJECTION, POWDER, FOR SOLUTION INTRAMUSCULAR; INTRAVENOUS at 19:44

## 2018-10-09 NOTE — XR
EXAMINATION TYPE: XR chest 2V

 

DATE OF EXAM: 10/9/2018

 

COMPARISON: Prior chest x-ray 9/26/2018, CT chest abdomen pelvis 5/23/2018

 

HISTORY: Difficulty breathing

 

TECHNIQUE:  Frontal and lateral views of the chest are obtained.

 

FINDINGS:  There is retrocardiac density with obscured left hemidiaphragm, blunting of the left costo
phrenic angle. The cardiac silhouette size is stable, enlarged. Prominent lung volumes may be indicat
arturo of underlying COPD. There is thoracic spondylosis. Bone mineralization is reduced.  The osseous s
tructures are remarkable for vertebral compression deformity as noted on prior at T12, chronic. Hiata
l hernia is present, there is likely fixed partial intrathoracic stomach. Surgical clips in the upper
 abdomen. Aorta is dense, aneurysmal.

 

IMPRESSION:  Left lower lobe atelectasis and associated effusion, correlate to exclude pneumonia. Sta
ble cardiomegaly. Follow-up recommended. Additional findings above.

## 2018-10-09 NOTE — HP
HISTORY AND PHYSICAL



DATE OF SERVICE:

10/09/2018



CHIEF COMPLAINT:

Shortness of breath.



HISTORY OF PRESENT ILLNESS:

This 78-year-old woman with a past medical history of CHF, COPD, GERD, hypertension,

history of ovarian cancer, total abdominal hysterectomy, being followed by Dr. Lay

in the outpatient setting, was complaining of shortness of breath.  The patient

apparently had a pleural effusion recently that was drained in [facility]; their assessment is not available at this time. Because of increasing

difficulty, the patient came to [facility]. Her chest x-ray showed left lower

lobe atelectasis, associated effusion. Pneumonia needs to be ruled out.  The patient

also has a past history of multiple medical problems, including tiredness, weakness;

during chemotherapy also.  There is no history of any fever, rigor or chills. No

history of headache, loss of consciousness,  seizures.



PAST MEDICAL HISTORY:

1. History of ovarian cancer, on chemotherapy.

2. History of COPD.

3. History of CHF.

4. GERD.

5. Hypertension.

6. History of hernia repair.

7. History of anxiety.



HOME MEDICATIONS:

1. Norvasc 2.5 mg at bedtime and 5 mg p.o. daily.

2. Multivitamins 1 p.o. daily.

3. Toprol-XL 50 mg p.o. daily.

4. Folic acid 1 mg p.o. daily.

5. Lexapro 10 mg at bedtime.

6. Dexamethasone 4 mg p.o. b.i.d.

7. Vitamin D3 2000 units.

8. Vitamin C 1000 mg.



ALLERGIES:

ASPIRIN.



FAMILY HISTORY:

History of cancer, CHF, rheumatic fever.



SOCIAL HISTORY:

No history of smoking.  No history of alcohol intake.



REVIEW OF SYSTEMS:

ENT: Diminished hearing. Diminished vision.

CARDIOVASCULAR SYSTEM: As mentioned earlier.

RESPIRATORY SYSTEM: As mentioned earlier.

GI: No nausea, vomiting.

: As mentioned earlier.

NERVOUS SYSTEM: No numbness, weakness.

ALLERGY/IMMUNOLOGY: No asthma, hayfever.

MUSCULOSKELETAL: As mentioned earlier.

HEMATOLOGY/ONCOLOGY: As mentioned earlier.

ENDOCRINE: No history of diabetes, hypothyroidism.

CONSTITUTIONAL: As mentioned earlier.

DERMATOLOGY: Negative.

RHEUMATOLOGY: Negative.

PSYCHIATRY: As mentioned earlier.



PHYSICAL EXAMINATION:

Patient is alert and oriented x3.  The pulse is 67, blood pressure 130/79, respiration

14, temperature 97.4, pulse ox 98% on room air.

HEENT: Conjunctivae normal. Oral mucosa moist.

NECK: No jugular venous distention. No carotid bruit. No lymph node enlargement.

CARDIOVASCULAR SYSTEM:  S1, S2 muffled.

RESPIRATORY SYSTEM: Breath sounds diminished at the bases.  Bilateral scattered rhonchi

and crackles.

ABDOMEN: Soft, obese, non-tender. No mass palpable.

LEGS: No edema. No swelling.

NERVOUS SYSTEM: Higher functions as mentioned earlier. Moves all 4 limbs. No focal

motor or sensory deficit.

LYMPHATICS: No lymph node palpable in neck, axillae or groin.

SKIN: No ulcer, rash, bleeding.

JOINTS: No active deforming arthropathy.



LABS:

WBC 18.2, hemoglobin 9.5. APTT 20.3. Sodium 134, lactic acid 2.4.



ASSESSMENT:

1. Shortness of breath; possibly left lower pneumonia with associated effusion with

    possible sepsis, present on admission.

2. Urinary tract infection.

3. Increased white count.

4. 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

5. Hyponatremia.

6. Ovarian cancer, on chemotherapy.

7. Hypomagnesemia.

8. History of congestive heart failure.

9. Chronic obstructive pulmonary disease.

10.Gastroesophageal reflux disease.

11.Hypertension.

12.History of ovarian cancer, status post total abdominal hysterectomy, bilateral

    salpingo-oophorectomy.

13.On chemotherapy.

14.Peptic ulcer disease.

15.Hernia repair.

16.History of anxiety.

17.Abdominal hernia repair.

18.History of recent thoracocentesis, recurrent pleural effusion.



RECOMMENDATIONS AND DISCUSSION:

In this 78-year-old woman who presented with multiple complex medical issues, we will

monitor the patient closely, continue the current medications, continue with

symptomatic treatment. Otherwise at this time I would recommend broad-spectrum IV

antibiotics for the pneumonia.  Patient has been started on cefepime and vancomycin.

Also consult Hematology/Oncology and Pulmonary, also. Prognosis is guarded because of

multiple complex medical issues. Further recommendations to follow.  Resume the home

medications.  Guarded prognosis because of multiple complex medical issues. See orders

for further details. Discussed with the family at length.  The patient is FULL CODE

currently.  A copy of this dictation is being forwarded to Dr. Lay, who is the

primary physician.





MMODL / IJN: [id number] / Job#: [id number]

## 2018-10-09 NOTE — ED
General Adult HPI





- General


Chief complaint: Shortness of Breath


Stated complaint: SOB


Time Seen by Provider: 10/09/18 12:10


Source: patient, RN notes reviewed, old records reviewed


Mode of arrival: ambulatory


Limitations: no limitations





- History of Present Illness


Initial comments: 





78 female presents for evaluation of generalized weakness and exertional 

dyspnea.  Patient has history of stage IV ovarian cancer.  She was previously 

on chemotherapy, she has completed her most recent treatment schedule 

approximately 3 weeks ago.  She is not currently scheduled for chemotherapy.  

She does have history of congestive heart failure.  She is complaining of some 

lower extremity edema and has been dealing with recurrent pleural effusions 

over the past several weeks.  Denies fever or chills.  Denies significant 

cough.  Denies central chest pain.  Denies abdominal pain nausea vomiting.  

Denies fever or chills.  She is complaining of some dysuria and is currently on 

an antibiotic for urinary tract infection.





- Related Data


 Home Medications











 Medication  Instructions  Recorded  Confirmed


 


Escitalopram [Lexapro] 10 mg PO HS 05/05/15 10/09/18


 


Cholecalciferol [Vitamin D3] 2,000 unit PO DAILY 03/31/16 10/09/18


 


Multivit-Min/Iron/Folic/Lutein 1 tab PO HS 03/19/17 10/09/18





[Centrum Silver Women Tablet]   


 


Ascorbic Acid [Vitamin C] 1,000 mg PO DAILY 04/05/17 10/09/18


 


amLODIPine BESYLATE [Norvasc] 5 mg PO DAILY 08/17/18 10/09/18


 


Dexamethasone 4 mg PO BID 09/24/18 10/09/18


 


amLODIPine [Norvasc] 2.5 mg PO HS 09/24/18 10/09/18


 


Folic Acid 1 mg PO DAILY 09/26/18 10/09/18


 


Metoprolol Succinate (ER) [Toprol 50 mg PO DAILY 10/09/18 10/09/18





Xl]   











 Allergies











Allergy/AdvReac Type Severity Reaction Status Date / Time


 


aspirin Allergy  Unknown Verified 10/09/18 13:14














Review of Systems


ROS Statement: 


Those systems with pertinent positive or pertinent negative responses have been 

documented in the HPI.





ROS Other: All systems not noted in ROS Statement are negative.





Past Medical History


Past Medical History: Cancer, Heart Failure, COPD, GERD/Reflux, Hypertension


Additional Past Medical History / Comment(s): Ovarian cancer status post total 

abdominal hysterectomy and bilateral salpingo-oophorectomy and status post 

systemic chemotherapy, colon cancer status post colectomy, peptic ulcer disease

, osteoporosis, moderate aortic regurgitation with mild aortic stenosis, mild 

mitral regurgitation and secondary pulmonary hypertension as evident on 

echocardiogram, chronic anxiety, shingles, osteoarthritis


History of Any Multi-Drug Resistant Organisms: None Reported


Past Surgical History: Hernia Repair, Hysterectomy


Additional Past Surgical History / Comment(s): ELVIA and BSO, colectomy, 

abdominal hernia repair


Past Anesthesia/Blood Transfusion Reactions: No Reported Reaction


Additional Past Anesthesia/Blood Transfusion Reaction / Comment(s): Pt has 

never recieved blood.


Past Psychological History: Anxiety


Smoking Status: Never smoker


Past Alcohol Use History: None Reported


Past Drug Use History: None Reported





- Past Family History


  ** Father


Family Medical History: Cancer, Congestive Heart Failure (CHF)


Additional Family Medical History / Comment(s): Father had rheumatic fever as a 

child.  He  in his 60s.





  ** Mother


Family Medical History: Congestive Heart Failure (CHF)





General Exam


Limitations: no limitations


General appearance: alert, in no apparent distress


Head exam: Present: atraumatic, normocephalic


Eye exam: Present: normal appearance, PERRL, EOMI


ENT exam: Present: mucous membranes moist


Neck exam: Present: normal inspection.  Absent: tenderness, meningismus


Respiratory exam: Present: decreased breath sounds (Decreased lung sounds at 

bilateral lung bases).  Absent: respiratory distress, wheezes


Cardiovascular Exam: Present: regular rate, normal rhythm


GI/Abdominal exam: Present: soft.  Absent: distended, tenderness


Extremities exam: Present: pedal edema (1+ bilateral pitting edema)


Neurological exam: Present: alert, oriented X3, CN II-XII intact.  Absent: 

motor sensory deficit


Psychiatric exam: Present: normal affect, normal mood


Skin exam: Present: warm, dry, intact.  Absent: cyanosis, diaphoretic





Course


 Vital Signs











  10/09/18





  12:00


 


Temperature 98.5 F


 


Pulse Rate 60


 


Respiratory 18





Rate 


 


Blood Pressure 136/73


 


O2 Sat by Pulse 99





Oximetry 














EKG Findings





- EKG Comments:


EKG Findings:: EKG: Sinus bradycardia, no ST segment elevation, rate of 56, MI 

interval 118, QRS duration 72, 





Medical Decision Making





- Medical Decision Making





77 yo    female with ovarian cancer presenting with generalized weakness and 

dyspnea.  X-rays obtained, does show effusion with concern for left lower lobe 

pneumonia.  Patient has an elevated white blood cell count 18.2 which is 

predominantly neutrophils.  She will be treated for healthcare associated 

pneumonia.  Magnesium is low and is replaced.  Urinalysis is pending as patient 

has been unable to give a urine sample emergency department.  She started on 

vancomycin and cefepime.  She will be admitted with oncology on consult.





- Lab Data


Result diagrams: 


 10/09/18 12:21





 10/09/18 12:21


 Lab Results











  10/09/18 10/09/18 10/09/18 Range/Units





  12:21 12:21 12:21 


 


WBC    18.2 H  (3.8-10.6)  k/uL


 


RBC    3.08 L  (3.80-5.40)  m/uL


 


Hgb    9.5 L D  (11.4-16.0)  gm/dL


 


Hct    27.9 L  (34.0-46.0)  %


 


MCV    90.7  D  (80.0-100.0)  fL


 


MCH    30.8  (25.0-35.0)  pg


 


MCHC    33.9  (31.0-37.0)  g/dL


 


RDW    16.3 H  (11.5-15.5)  %


 


Plt Count    146 L  (150-450)  k/uL


 


Neutrophils %    93  %


 


Lymphocytes %    3  %


 


Monocytes %    4  %


 


Eosinophils %    0  %


 


Basophils %    0  %


 


Neutrophils #    16.8 H  (1.3-7.7)  k/uL


 


Lymphocytes #    0.5 L  (1.0-4.8)  k/uL


 


Monocytes #    0.8  (0-1.0)  k/uL


 


Eosinophils #    0.0  (0-0.7)  k/uL


 


Basophils #    0.0  (0-0.2)  k/uL


 


Anisocytosis    Slight  


 


PT     (9.0-12.0)  sec


 


INR     (<1.2)  


 


APTT     (22.0-30.0)  sec


 


Sodium  134 L    (137-145)  mmol/L


 


Potassium  4.4    (3.5-5.1)  mmol/L


 


Chloride  103    ()  mmol/L


 


Carbon Dioxide  21 L    (22-30)  mmol/L


 


Anion Gap  10    mmol/L


 


BUN  44 H    (7-17)  mg/dL


 


Creatinine  0.91    (0.52-1.04)  mg/dL


 


Est GFR (CKD-EPI)AfAm  71    (>60 ml/min/1.73 sqM)  


 


Est GFR (CKD-EPI)NonAf  61    (>60 ml/min/1.73 sqM)  


 


Glucose  158 H    (74-99)  mg/dL


 


Calcium  8.8    (8.4-10.2)  mg/dL


 


Magnesium  1.2 L    (1.6-2.3)  mg/dL


 


Total Bilirubin  0.9    (0.2-1.3)  mg/dL


 


AST  30    (14-36)  U/L


 


ALT  42    (9-52)  U/L


 


Alkaline Phosphatase  52    ()  U/L


 


NT-Pro-B Natriuret Pep   1280   pg/mL


 


Total Protein  5.7 L    (6.3-8.2)  g/dL


 


Albumin  3.2 L    (3.5-5.0)  g/dL














  10/09/18 Range/Units





  12:21 


 


WBC   (3.8-10.6)  k/uL


 


RBC   (3.80-5.40)  m/uL


 


Hgb   (11.4-16.0)  gm/dL


 


Hct   (34.0-46.0)  %


 


MCV   (80.0-100.0)  fL


 


MCH   (25.0-35.0)  pg


 


MCHC   (31.0-37.0)  g/dL


 


RDW   (11.5-15.5)  %


 


Plt Count   (150-450)  k/uL


 


Neutrophils %   %


 


Lymphocytes %   %


 


Monocytes %   %


 


Eosinophils %   %


 


Basophils %   %


 


Neutrophils #   (1.3-7.7)  k/uL


 


Lymphocytes #   (1.0-4.8)  k/uL


 


Monocytes #   (0-1.0)  k/uL


 


Eosinophils #   (0-0.7)  k/uL


 


Basophils #   (0-0.2)  k/uL


 


Anisocytosis   


 


PT  11.2  (9.0-12.0)  sec


 


INR  1.2 H  (<1.2)  


 


APTT  20.3 L  (22.0-30.0)  sec


 


Sodium   (137-145)  mmol/L


 


Potassium   (3.5-5.1)  mmol/L


 


Chloride   ()  mmol/L


 


Carbon Dioxide   (22-30)  mmol/L


 


Anion Gap   mmol/L


 


BUN   (7-17)  mg/dL


 


Creatinine   (0.52-1.04)  mg/dL


 


Est GFR (CKD-EPI)AfAm   (>60 ml/min/1.73 sqM)  


 


Est GFR (CKD-EPI)NonAf   (>60 ml/min/1.73 sqM)  


 


Glucose   (74-99)  mg/dL


 


Calcium   (8.4-10.2)  mg/dL


 


Magnesium   (1.6-2.3)  mg/dL


 


Total Bilirubin   (0.2-1.3)  mg/dL


 


AST   (14-36)  U/L


 


ALT   (9-52)  U/L


 


Alkaline Phosphatase   ()  U/L


 


NT-Pro-B Natriuret Pep   pg/mL


 


Total Protein   (6.3-8.2)  g/dL


 


Albumin   (3.5-5.0)  g/dL














Disposition


Clinical Impression: 


 HCAP (healthcare-associated pneumonia)





Disposition: ADMITTED AS IP TO THIS [facility]


Condition: Stable


Is patient prescribed a controlled substance at d/c from ED?: No


Referrals: 


PINA Lay III, MD [Primary Care Provider] - 1-2 days


Decision to Admit Reason: Admit from EC


Decision Date: 10/09/18


Decision Time: 14:19

## 2018-10-10 VITALS — RESPIRATION RATE: 16 BRPM

## 2018-10-10 LAB
ALBUMIN SERPL-MCNC: 2.5 G/DL (ref 3.5–5)
ALP SERPL-CCNC: 40 U/L (ref 38–126)
ALT SERPL-CCNC: 40 U/L (ref 9–52)
ANION GAP SERPL CALC-SCNC: 6 MMOL/L
AST SERPL-CCNC: 21 U/L (ref 14–36)
BASOPHILS # BLD AUTO: 0 K/UL (ref 0–0.2)
BASOPHILS NFR BLD AUTO: 0 %
BUN SERPL-SCNC: 50 MG/DL (ref 7–17)
CALCIUM SPEC-MCNC: 7.9 MG/DL (ref 8.4–10.2)
CHLORIDE SERPL-SCNC: 106 MMOL/L (ref 98–107)
CO2 SERPL-SCNC: 22 MMOL/L (ref 22–30)
EOSINOPHIL # BLD AUTO: 0 K/UL (ref 0–0.7)
EOSINOPHIL NFR BLD AUTO: 0 %
ERYTHROCYTE [DISTWIDTH] IN BLOOD BY AUTOMATED COUNT: 2.56 M/UL (ref 3.8–5.4)
ERYTHROCYTE [DISTWIDTH] IN BLOOD: 16 % (ref 11.5–15.5)
GLUCOSE SERPL-MCNC: 243 MG/DL (ref 74–99)
HCT VFR BLD AUTO: 23.8 % (ref 34–46)
HGB BLD-MCNC: 8.2 GM/DL (ref 11.4–16)
LYMPHOCYTES # SPEC AUTO: 0.5 K/UL (ref 1–4.8)
LYMPHOCYTES NFR SPEC AUTO: 4 %
MAGNESIUM SPEC-SCNC: 1.6 MG/DL (ref 1.6–2.3)
MCH RBC QN AUTO: 32.1 PG (ref 25–35)
MCHC RBC AUTO-ENTMCNC: 34.6 G/DL (ref 31–37)
MCV RBC AUTO: 92.8 FL (ref 80–100)
MONOCYTES # BLD AUTO: 0.7 K/UL (ref 0–1)
MONOCYTES NFR BLD AUTO: 6 %
NEUTROPHILS # BLD AUTO: 11.6 K/UL (ref 1.3–7.7)
NEUTROPHILS NFR BLD AUTO: 90 %
PLATELET # BLD AUTO: 117 K/UL (ref 150–450)
POTASSIUM SERPL-SCNC: 4.6 MMOL/L (ref 3.5–5.1)
PROT SERPL-MCNC: 4.7 G/DL (ref 6.3–8.2)
SODIUM SERPL-SCNC: 134 MMOL/L (ref 137–145)
WBC # BLD AUTO: 13 K/UL (ref 3.8–10.6)

## 2018-10-10 RX ADMIN — FOLIC ACID SCH MG: 1 TABLET ORAL at 13:29

## 2018-10-10 RX ADMIN — ESCITALOPRAM OXALATE SCH MG: 10 TABLET, FILM COATED ORAL at 22:14

## 2018-10-10 RX ADMIN — Medication SCH UNIT: at 13:29

## 2018-10-10 RX ADMIN — CEFAZOLIN SCH: 330 INJECTION, POWDER, FOR SOLUTION INTRAMUSCULAR; INTRAVENOUS at 08:06

## 2018-10-10 RX ADMIN — PANTOPRAZOLE SODIUM SCH MG: 40 TABLET, DELAYED RELEASE ORAL at 08:05

## 2018-10-10 RX ADMIN — SODIUM CHLORIDE SCH MLS/HR: 9 INJECTION, SOLUTION INTRAVENOUS at 16:46

## 2018-10-10 RX ADMIN — OXYCODONE HYDROCHLORIDE AND ACETAMINOPHEN SCH MG: 500 TABLET ORAL at 13:29

## 2018-10-10 RX ADMIN — METOPROLOL SUCCINATE SCH MG: 50 TABLET, EXTENDED RELEASE ORAL at 08:05

## 2018-10-10 RX ADMIN — HEPARIN SODIUM SCH UNIT: 5000 INJECTION, SOLUTION INTRAVENOUS; SUBCUTANEOUS at 22:13

## 2018-10-10 RX ADMIN — CEFEPIME HYDROCHLORIDE SCH MLS/HR: 2 INJECTION, POWDER, FOR SOLUTION INTRAVENOUS at 00:33

## 2018-10-10 RX ADMIN — IPRATROPIUM BROMIDE AND ALBUTEROL SULFATE SCH ML: .5; 3 SOLUTION RESPIRATORY (INHALATION) at 21:28

## 2018-10-10 RX ADMIN — IPRATROPIUM BROMIDE AND ALBUTEROL SULFATE SCH ML: .5; 3 SOLUTION RESPIRATORY (INHALATION) at 07:01

## 2018-10-10 RX ADMIN — CEFEPIME HYDROCHLORIDE SCH MLS/HR: 2 INJECTION, POWDER, FOR SOLUTION INTRAVENOUS at 08:05

## 2018-10-10 RX ADMIN — CEFEPIME HYDROCHLORIDE SCH MLS/HR: 2 INJECTION, POWDER, FOR SOLUTION INTRAVENOUS at 15:59

## 2018-10-10 RX ADMIN — IPRATROPIUM BROMIDE AND ALBUTEROL SULFATE SCH ML: .5; 3 SOLUTION RESPIRATORY (INHALATION) at 12:17

## 2018-10-10 RX ADMIN — THERA TABS SCH EACH: TAB at 22:13

## 2018-10-10 RX ADMIN — SODIUM CHLORIDE SCH MLS/HR: 9 INJECTION, SOLUTION INTRAVENOUS at 00:34

## 2018-10-10 RX ADMIN — HEPARIN SODIUM SCH UNIT: 5000 INJECTION, SOLUTION INTRAVENOUS; SUBCUTANEOUS at 08:06

## 2018-10-10 RX ADMIN — CEFAZOLIN SCH MLS/HR: 330 INJECTION, POWDER, FOR SOLUTION INTRAMUSCULAR; INTRAVENOUS at 15:58

## 2018-10-10 NOTE — P.CONS
History of Present Illness





- Reason for Consult


Consult date: 10/10/18


on chemo for metastatic ovarian adenocarcinoma


Requesting physician: Hood Brown





- Chief Complaint


weakness





- History of Present Illness





Ms. Avelar is a very pleasant  female pt of Dr. Vale who was initially 

seen in consult 5/8/15 when she was admitted with c/o 2-3 days of generalized 

abdominal discomfort, bloating, emesis x 1, severe heartburn, and decreased 

appetite with minor weight loss over the prior month. Work up revealed found 

acute renal failure, CT AP showed ascites, paracentesis of 4.7 L with cytology 

positive for adenocarcinoma, consistent with ovarian or primary peritoneal 

origin per IHC. TVUS did show an endocervical mass like area of 1.2 cm, Ca 125 

was 2760. Staging PET scan on 5/15/15 showed no liver involvement or disease 

outside the A/P. She was referred to Dr Eden and neoadjuvant treatment with 

Carboplatin and dose dense Taxol was recommended. She had 3 cycles and then 

surgery on 9/21/15.   She started adjuvant chemo on 10/24/15 and completed a 

total of 6 cycles.  Treatment evaluation CT showed no evidence of recurrence. 

Dr Eden placed on surveillance. Pt cont f/u with CT scans and Ca 125 

monitoring and did well until labs in  showed persistent increase in Ca 

125. She had 2nd look surgery in early 3/17, with apparently all obvious tumor 

removed. She was admitted to [facility] on 3/20/17 with abdominal pain and emesis and 

transferred back to [facility], and had surgery for an anastomotic leak. She was 

discharged home in early . Dr. Eden recommended resuming treatment and pt 

was started on Doxil 17, and after 4 cycles Tx evaluation CT and  

showed progression.  Foundation One testing in 10/17 revealed no actionable 

mutations with MSI stable, low TMB, and no BRCA 1 an 2 mutations. Carbo/Gemzar 

was started on 10/9/17, dose was reduced for cycle 3 and for #4 due to 

hematologic toxicity.  CT  showed progression.  Treatment recommendation 

was to change to Alimta, started 3/19/18.  Pt had problems with ARF and 

dehydration, and progressive weakness from treatment, so treatment was changed 

to Taxol in early .  She developed a left pleural effusion and had 

thoracentesis on 8/29/18 with fluid positive for malignant adenocarcinoma c/w 

ovarian primary. 





She had another thoracentesis last week and now she presents to hospital with 

progressive weakness and PEOPLES.  Denies fever, oral irritation, difficulty 

swallowing, appetite is poor, no nausea, vomiting, cough, abd pain, cramping, 

acute changes in bowel habits, black or bloody stool, she has had ongoing 

urinary symptoms of frequency, some urgency, no hematuria, swelling or pain.   





Review of Systems





14 point ROS as stated in HPI   





Past Medical History


Past Medical History: Cancer, Heart Failure, COPD, GERD/Reflux, Hypertension


Additional Past Medical History / Comment(s): Ovarian cancer status post total 

abdominal hysterectomy and bilateral salpingo-oophorectomy and status post 

systemic chemotherapy, (previously charted colon cancer status post colectomy 

but pt and family stated "never told she had colon cancer"), peptic ulcer 

disease, osteoporosis, chronic anxiety, shingles, osteoarthritis


History of Any Multi-Drug Resistant Organisms: None Reported


Past Surgical History: Hernia Repair, Hysterectomy


Additional Past Surgical History / Comment(s): ELVIA and BSO, colectomy, 

abdominal hernia repair, lt thoracentesis x2 for recurrant pleural effusion


Past Anesthesia/Blood Transfusion Reactions: No Reported Reaction


Additional Past Anesthesia/Blood Transfusion Reaction / Comm: has received blood

-no rection


Past Psychological History: Anxiety


Additional Psychological History / Comment(s): Pt lives alone in a single story 

home that has 1 step to get into.  She has a very supportive family and good 

friends and neighbors.  She is normally active. in past has recieved home care 

thru [facility]. has no hospital equipment.


Smoking Status: Never smoker


Past Alcohol Use History: None Reported


Past Drug Use History: None Reported





- Past Family History


  ** Father


Family Medical History: Cancer, Congestive Heart Failure (CHF)


Additional Family Medical History / Comment(s): Father had rheumatic fever as a 

child.  He  in his 60s.





  ** Mother


Family Medical History: Congestive Heart Failure (CHF)





Medications and Allergies


 Home Medications











 Medication  Instructions  Recorded  Confirmed  Type


 


Escitalopram [Lexapro] 10 mg PO HS 05/05/15 10/09/18 History


 


Cholecalciferol [Vitamin D3] 2,000 unit PO DAILY 03/31/16 10/09/18 History


 


Multivit-Min/Iron/Folic/Lutein 1 tab PO HS 03/19/17 10/09/18 History





[Centrum Silver Women Tablet]    


 


Ascorbic Acid [Vitamin C] 1,000 mg PO DAILY 04/05/17 10/09/18 History


 


amLODIPine BESYLATE [Norvasc] 5 mg PO DAILY 08/17/18 10/09/18 History


 


Dexamethasone 4 mg PO BID 09/24/18 10/09/18 History


 


amLODIPine [Norvasc] 2.5 mg PO HS 09/24/18 10/09/18 History


 


Folic Acid 1 mg PO DAILY 09/26/18 10/09/18 History


 


Metoprolol Succinate (ER) [Toprol 50 mg PO DAILY 10/09/18 10/09/18 History





Xl]    











 Allergies











Allergy/AdvReac Type Severity Reaction Status Date / Time


 


aspirin Allergy  Unknown Verified 10/09/18 13:14














Physical Exam


Vitals: 


 Vital Signs











  Temp Pulse Pulse Resp BP BP Pulse Ox


 


 10/10/18 12:29   72     


 


 10/10/18 12:17   68     


 


 10/10/18 12:00  97.7 F   61  16   152/63  96


 


 10/10/18 07:14   72     


 


 10/10/18 07:01   72     


 


 10/10/18 06:11  96.9 F L   64  18   124/68 


 


 10/09/18 23:22    64  18   


 


 10/09/18 20:38   70     


 


 10/09/18 20:25  97.9 F  68  64  18   153/74  98


 


 10/09/18 17:46  97.3 F L   67  14   138/79  98


 


 10/09/18 16:59  98.9 F  97   18  143/80   99








 Intake and Output











 10/10/18 10/10/18 10/10/18





 06:59 14:59 22:59


 


Intake Total 520 700 


 


Balance 520 700 


 


Intake:   


 


   100 


 


    Cefepime 2 gm In Sodium 520 100 





    Chloride 0.9% 100 ml @   





    200 mls/hr IVPB ONCE STA   





    Rx#:[id number]   


 


  Intake, IV Titration  600 





  Amount   


 


    Sodium Chloride 0.9% 1,  600 





    000 ml @ 75 mls/hr IV .   





    [id number] [facility] Rx#:[id number]   


 


Other:   


 


  Voiding Method Toilet Toilet 





 Diaper Diaper 





 Incontinent Incontinent 


 


  # Voids  2 














- Constitutional


General appearance: average body habitus, cooperative, no acute distress





- EENT


Eyes: anicteric sclerae, EOMI, normal appearance


ENT: normal oropharynx





- Neck


Neck: no lymphadenopathy





- Respiratory


Respiratory: right: CTA, left: diminished (base)





- Cardiovascular


Heart sounds: normal: S1, S2


  ** leg


Peripheral Edema: bilateral: Trace





- Gastrointestinal


General gastrointestinal: no absent bowel sounds, no decreased bowel sounds, no 

distended, no hepatomegaly, no hyperactive bowel sounds, normal bowel sounds, 

no organomegaly, no rigid, no scaphoid, soft, no splenomegaly, no tenderness, 

no umbilical hernia, no ventral hernia





- Integumentary


Integumentary: pale





- Neurologic


Neurologic: CNII-XII intact





- Musculoskeletal


Musculoskeletal: generalized weakness





- Psychiatric


Psychiatric: A&O x's 3, appropriate affect, intact judgment & insight





Results


CBC & Chem 7: 


 10/10/18 07:01





 10/10/18 07:01


Labs: 


 Abnormal Lab Results - Last 24 Hours (Table)











  10/09/18 10/09/18 10/10/18 Range/Units





  18:03 23:12 07:01 


 


WBC     (3.8-10.6)  k/uL


 


RBC     (3.80-5.40)  m/uL


 


Hgb     (11.4-16.0)  gm/dL


 


Hct     (34.0-46.0)  %


 


RDW     (11.5-15.5)  %


 


Plt Count     (150-450)  k/uL


 


Neutrophils #     (1.3-7.7)  k/uL


 


Lymphocytes #     (1.0-4.8)  k/uL


 


Sodium    134 L  (137-145)  mmol/L


 


BUN    50 H  (7-17)  mg/dL


 


Glucose    243 H  (74-99)  mg/dL


 


Plasma Lactic Acid Juan Carlos  3.1 H*  2.8 H*   (0.7-2.0)  mmol/L


 


Calcium    7.9 L  (8.4-10.2)  mg/dL


 


Total Protein    4.7 L  (6.3-8.2)  g/dL


 


Albumin    2.5 L  (3.5-5.0)  g/dL














  10/10/18 Range/Units





  07:01 


 


WBC  13.0 H  (3.8-10.6)  k/uL


 


RBC  2.56 L  (3.80-5.40)  m/uL


 


Hgb  8.2 L  (11.4-16.0)  gm/dL


 


Hct  23.8 L  (34.0-46.0)  %


 


RDW  16.0 H  (11.5-15.5)  %


 


Plt Count  117 L  (150-450)  k/uL


 


Neutrophils #  11.6 H  (1.3-7.7)  k/uL


 


Lymphocytes #  0.5 L  (1.0-4.8)  k/uL


 


Sodium   (137-145)  mmol/L


 


BUN   (7-17)  mg/dL


 


Glucose   (74-99)  mg/dL


 


Plasma Lactic Acid Juan Carlos   (0.7-2.0)  mmol/L


 


Calcium   (8.4-10.2)  mg/dL


 


Total Protein   (6.3-8.2)  g/dL


 


Albumin   (3.5-5.0)  g/dL








 Microbiology - Last 24 Hours (Table)











 10/09/18 14:15 Urine Culture - Preliminary





 Urine,Clean Catch 











Comments: 





US chest report reviewed


Chest x-ray: report reviewed





Assessment and Plan


(1) Pleural effusion


Narrative/Plan: 


Malignant pleural effusion, last thoracentesis was last week.  Pulmonary 

consulted.  May need to consider pleurex drain due to rapid recurrence.  Will 

await Pulmonary evaluation and recommendations. 


Current Visit: Yes   Status: Acute   Priority: Medium   Code(s): J90 - PLEURAL 

EFFUSION, NOT ELSEWHERE CLASSIFIED   SNOMED Code(s): 75344031


   





(2) Physical debility


Narrative/Plan: 


Pt has progressive weakness-multi-factorial including, malignancy, chemo, 

anemia and SOB from malignant pleural effusion.  Pt is 3 weeks out from last 

cycle with CBC stable but, physical side effects persistent.  Pt will be 

medically treated for presenting symptoms, PT/OT will be requested and we will 

cont to monitor pt progress.  


Current Visit: Yes   Status: Acute   Priority: High   Code(s): R53.81 - OTHER 

MALAISE   SNOMED Code(s): 96928689


   





(3) Ovarian cancer


Narrative/Plan: 


Last treatment with taxol was , her most recent cycle was held due to [place] 

and pleural effusion.  


There is a dose reduction planned but, there may need to be consideration for 

treatment change due to raid recurrence of pleural effusions.  





Current Visit: No   Status: Acute   Priority: High   Code(s): C56.9 - MALIGNANT 

NEOPLASM OF UNSPECIFIED OVARY   SNOMED Code(s): 669677170


   


Plan: 





 Attests: I have performed H&P and developed impression and plan of care of 

patient, discussed with dictator.  I agree with dictated note, documented as a 

scribe.

## 2018-10-10 NOTE — US
EXAMINATION TYPE: US chest

 

DATE OF EXAM: 10/10/2018

 

COMPARISON: X ray, US

 

CLINICAL HISTORY: pleural effusion, please eval for poss thora .

 

TECHNIQUE:  Targeted ultrasound of the posterior lower bilateral hemithoraces

 

EXAM MEASUREMENTS:

 

Right Pleural Effusion pocket size:  minimal fluid seen and not measured

Left Pleural Effusion pocket size:  4.9 cm A/P

**  Left skin surface to fluid distance:  2.4 cm A/P

 

 

Right side was not marked for possible thoracentesis outside the dept.

 

Left side was marked for possible thoracentesis outside the dept.

 

Pulmonologists are able to review the images in the patient?s EMR.  

 

 

 

IMPRESSIONS: 

1. Small left pleural effusion.

## 2018-10-10 NOTE — PN
PROGRESS NOTE



DATE OF SERVICE:

10/10/2018



This 78-year-old woman who was admitted with generalized weakness and tiredness

secondary to chemotherapy, also had stage IV ovarian cancer, left pleural 
effusion was

suspected.  Patient was started on broad-spectrum IV antibiotics.  Dr. Post is

following the patient closely.  Chest ultrasound showed only small left pleural

effusion.  Patient being closely monitored.



PAST MEDICAL HISTORY:

Reviewed.



REVIEW OF SYSTEMS:

CARDIOVASCULAR:  No angina or palpitations.

RESPIRATIONS: As mentioned earlier.

GI as mentioned earlier.

:  No dysuria.

CENTRAL NERVOUS SYSTEM: No numbness or weakness.



CURRENT MEDICATIONS:

Reviewed and include:

1. Tylenol 650 q.6h p.r.n.

2. DuoNeb q.i.d. and p.r.n.

3. Xanax.

4. Norvasc 2.5 mg q.h.s.

5. Vitamin C 1000 mg.

6. 2 g IV q.8 hours.

7. Lexapro.

8. Folic acid.

9. Melatonin.

10.Toprol.

11.Vancomycin.

12.Narcan.

13.Protonix.



PHYSICAL EXAM:

GENERAL: Patient is alert, oriented x3.

VITAL SIGNS: Pulse 61. Blood pressure 152/65, respiration 16, temperature 97.7, 
pulse

ox 98% on room air.

HEENT: Conjunctivae normal.  Oral mucosa moist.

NECK: Neck is no jugular venous distention.  No carotid bruit. No lymph node

enlargement.

CARDIOVASCULAR: S1, S2 muffled.

RESPIRATORY: Breath sounds diminished in the bases.  Few scattered rhonchi and

crackles.

ABDOMEN:  Soft, nontender.

LEGS: No edema. No swelling.

CENTRAL NERVOUS SYSTEM: No focal deficits.



LABS:

WBC 13.3, hemoglobin is 8.2, sodium 134.  Lactic acid 3.1.



ASSESSMENT:

1. Shortness of breath possibly chronic obstructive pulmonary disease  acute

    exacerbation with acute left lower lobe pneumonia with possible associated 
effusion

    and sepsis present on admission possibly gram-negative.

2. Urinary tract infection.

3. Increased WBC.

4. Ovarian cancer with a stage IV with METS.

5. 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

6. Hyponatremia.

7. Hypomagnesemia.

8. History of congestive heart failure.

9. History of chronic obstructive pulmonary disease.

10.Gastroesophageal reflux disease.

11.Hypertension.

12.Chemotherapy for ovarian cancer.

13.Abdominal hysterectomy and bilateral salpingo-oophorectomy.

14.Peptic ulcer disease.

15.History of hernia repair.

16.History of anxiety.

17.Abdominal hernia repair.

18.History of recent thoracocentesis recurrent pleural effusions.

19.FULL CODE.



RECOMMENDATIONS AND DISCUSSION:

This 78-year-old woman who presented with multiple complex medical issues, we 
will

monitor the patient closely, continue the current medications, management and

symptomatic treatment.  At this point, I would recommend to continue the IV 
cefepime

and follow the patient closely.  We will obtain the cultures.  The lactic acid 
is

elevated and we will get infectious evaluation.  Prognosis guarded.  The 
ultrasound

showed only left small pleural effusion.  Continue medical treatment currently.

Pulmonary is also consulted.  Prognosis guarded.  Further recommendations to 
follow.





MMODL / IJN: [id number] / Job#: [id number]

[id number]

## 2018-10-10 NOTE — P.CNPUL
History of Present Illness


Consult date: 10/10/18


Requesting physician: Rolan Levine


Reason for consult: dyspnea, abnormal CXR/CT


Chief complaint: Generalized weakness


History of present illness: 





This a very pleasant 78-year-old female patient who follows with Dr. Lay as 

her primary care physician.  She has a history of hypertension, congestive 

heart failure diastolic in nature echocardiogram revealed preserved left 

ventricular systolic function with ejection fraction 55-60%, gastroesophageal 

reflux disease, peptic ulcer disease, osteoporosis, moderate aortic 

regurgitation with mild aortic stenosis, mild mitral regurgitation secondary 

pulmonary hypertension, chronic anxiety, shingles.  She also has a history of 

ovarian cancer and is status post total abdominal hysterectomy and bilateral 

salpingo-oophorectomy followed by systemic chemotherapy.  She also has a 

history of colon cancer and status post colectomy.  She also has recent left 

recurrent pleural effusions and is status post thoracentesis 2.  The first one 

was done here by interventional radiology on 2018.  She also had a repeat 

one at [facility] on 10/03/2018.  This was positive for 

adenocarcinoma secondary to ovarian primary versus peritoneal.  She has been 

receiving chemotherapy her last dose was 3 weeks ago.  She presented here to 

the emergency room yesterday with progressive weakness and fatigue.  She states 

she was having trouble even getting herself up out of a chair.  She has been 

having some shortness of breath but not as bad as prior to her thoracentesis.  

No fever, chills or night sweats.  No cough or congestion.  She had been 

maintaining good O2 saturations in the upper 90s on room air.  She's been 

afebrile.  Hemodynamically stable.  Urine culture is pending.  Initial lactic 

acid 3.1.  Currently 1.5. She's been initiated on vancomycin and cefepime.  

Current white count 13.0.  Hemoglobin 8.2.  Platelet count 117,000.  Creatinine 

0.95.  ProBNP 1280.  Chest x-ray reveals a left lower lobe atelectasis and 

associated effusion.  She is seen today in consultation on the oncology floor.  

She is currently awake and alert in no acute distress.  She states she is 

feeling a bit better today as compared to yesterday still overall generalized 

weakness. 





Review of Systems


Constitutional: Reports fatigue, Reports poor appetite, Reports weakness


Eyes: denies blurred vision, denies decreased vision


Ears: deny: decreased hearing


Ears, nose, mouth and throat: Denies headache, Denies sore throat


Cardiovascular: Reports shortness of breath


Respiratory: Reports cough, Reports dyspnea


Gastrointestinal: Denies abdominal pain, Denies diarrhea, Denies nausea, Denies 

vomiting


Genitourinary: Reports urinary frequency


Musculoskeletal: Denies myalgias


Integumentary: Denies pruritus, Denies rash


Neurological: Reports weakness


Psychiatric: Reports anxiety


Endocrine: Denies fatigue, Denies weight change


Hematologic/Lymphatic: Reports as per HPI


Allergic/Immunologic: Reports as per HPI





Past Medical History


Past Medical History: Cancer, Heart Failure, COPD, GERD/Reflux, Hypertension


Additional Past Medical History / Comment(s): Ovarian cancer status post total 

abdominal hysterectomy and bilateral salpingo-oophorectomy and status post 

systemic chemotherapy, (previously charted colon cancer status post colectomy 

but pt and family stated "never told she had colon cancer"), peptic ulcer 

disease, osteoporosis, chronic anxiety, shingles, osteoarthritis


History of Any Multi-Drug Resistant Organisms: None Reported


Past Surgical History: Hernia Repair, Hysterectomy


Additional Past Surgical History / Comment(s): ELVIA and BSO, colectomy, 

abdominal hernia repair, lt thoracentesis x2 for recurrant pleural effusion


Past Anesthesia/Blood Transfusion Reactions: No Reported Reaction


Additional Past Anesthesia/Blood Transfusion Reaction / Comment(s): has 

received blood-no rection


Past Psychological History: Anxiety


Additional Psychological History / Comment(s): Pt lives alone in a single story 

home that has 1 step to get into.  She has a very supportive family and good 

friends and neighbors.  She is normally active. in past has recieved home care 

thru [facility]. has no hospital equipment.


Smoking Status: Never smoker


Past Alcohol Use History: None Reported


Past Drug Use History: None Reported





- Past Family History


  ** Father


Family Medical History: Cancer, Congestive Heart Failure (CHF)


Additional Family Medical History / Comment(s): Father had rheumatic fever as a 

child.  He  in his 60s.





  ** Mother


Family Medical History: Congestive Heart Failure (CHF)





Medications and Allergies


 Home Medications











 Medication  Instructions  Recorded  Confirmed  Type


 


Escitalopram [Lexapro] 10 mg PO HS 05/05/15 10/09/18 History


 


Cholecalciferol [Vitamin D3] 2,000 unit PO DAILY 03/31/16 10/09/18 History


 


Multivit-Min/Iron/Folic/Lutein 1 tab PO HS 03/19/17 10/09/18 History





[Centrum Silver Women Tablet]    


 


Ascorbic Acid [Vitamin C] 1,000 mg PO DAILY 04/05/17 10/09/18 History


 


amLODIPine BESYLATE [Norvasc] 5 mg PO DAILY 08/17/18 10/09/18 History


 


Dexamethasone 4 mg PO BID 09/24/18 10/09/18 History


 


amLODIPine [Norvasc] 2.5 mg PO HS 09/24/18 10/09/18 History


 


Folic Acid 1 mg PO DAILY 09/26/18 10/09/18 History


 


Metoprolol Succinate (ER) [Toprol 50 mg PO DAILY 10/09/18 10/09/18 History





Xl]    











 Allergies











Allergy/AdvReac Type Severity Reaction Status Date / Time


 


aspirin Allergy  Unknown Verified 10/09/18 13:14














Physical Exam


Vitals: 


 Vital Signs











  Temp Pulse Pulse Resp BP BP Pulse Ox


 


 10/10/18 07:14   72     


 


 10/10/18 07:01   72     


 


 10/10/18 06:11  96.9 F L   64  18   124/68 


 


 10/09/18 23:22    64  18   


 


 10/09/18 20:38   70     


 


 10/09/18 20:25  97.9 F  68  64  18   153/74  98


 


 10/09/18 17:46  97.3 F L   67  14   138/79  98


 


 10/09/18 16:59  98.9 F  97   18  143/80   99


 


 10/09/18 15:12   60   18  155/94   99


 


 10/09/18 12:00  98.5 F  60   18  136/73   99








 Intake and Output











 10/09/18 10/10/18 10/10/18





 22:59 06:59 14:59


 


Intake Total  520 


 


Balance  520 


 


Intake:   


 


  IV  520 


 


    Cefepime 2 gm In Sodium  520 





    Chloride 0.9% 100 ml @   





    200 mls/hr IVPB ONCE STA   





    Rx#:[id number]   


 


Other:   


 


  Voiding Method Toilet Toilet Toilet





 Diaper Diaper Diaper





 Incontinent Incontinent Incontinent


 


  # Voids 1  














- Constitutional


General appearance: average body habitus, no acute distress





- EENT


Eyes: EOMI, PERRLA


ENT: hearing grossly normal


Ears: bilateral: normal





- Neck


Neck: normal ROM


Carotids: bilateral: upstroke normal


Thyroid: bilateral: normal size





- Respiratory


Respiratory: left: dullness, rales





- Cardiovascular


Rhythm: regular


Heart sounds: normal: S1, S2





- Gastrointestinal


General gastrointestinal: normal bowel sounds





- Integumentary


Integumentary: normal turgor





- Neurologic


Neurologic: CNII-XII intact





- Musculoskeletal


Musculoskeletal: generalized weakness





- Psychiatric


Psychiatric: A&O x's 3, appropriate affect, intact judgment & insight





Results





- Laboratory Findings


CBC and BMP: 


 10/10/18 07:01





 10/10/18 07:01


PT/INR, D-dimer











PT  11.2 sec (9.0-12.0)   10/09/18  12:21    


 


INR  1.2  (<1.2)  H  10/09/18  12:21    








Abnormal lab findings: 


 Abnormal Labs











  10/09/18 10/09/18 10/09/18





  12:21 12:21 12:21


 


WBC   18.2 H 


 


RBC   3.08 L 


 


Hgb   9.5 L D 


 


Hct   27.9 L 


 


RDW   16.3 H 


 


Plt Count   146 L 


 


Neutrophils #   16.8 H 


 


Lymphocytes #   0.5 L 


 


INR    1.2 H


 


APTT    20.3 L


 


Sodium  134 L  


 


Carbon Dioxide  21 L  


 


BUN  44 H  


 


Glucose  158 H  


 


Plasma Lactic Acid Juan Carlos   


 


Calcium   


 


Magnesium  1.2 L  


 


Total Protein  5.7 L  


 


Albumin  3.2 L  


 


Urine Appearance   


 


Urine Protein   


 


Urine Glucose (UA)   


 


Urine Blood   


 


Ur Leukocyte Esterase   


 


Urine WBC   


 


Urine Bacteria   


 


Urine Mucus   














  10/09/18 10/09/18 10/09/18





  14:15 14:15 18:03


 


WBC   


 


RBC   


 


Hgb   


 


Hct   


 


RDW   


 


Plt Count   


 


Neutrophils #   


 


Lymphocytes #   


 


INR   


 


APTT   


 


Sodium   


 


Carbon Dioxide   


 


BUN   


 


Glucose   


 


Plasma Lactic Acid Juan Carlos   2.4 H*  3.1 H*


 


Calcium   


 


Magnesium   


 


Total Protein   


 


Albumin   


 


Urine Appearance  Turbid H  


 


Urine Protein  1+ H  


 


Urine Glucose (UA)  1+ H  


 


Urine Blood  Trace H  


 


Ur Leukocyte Esterase  Large H  


 


Urine WBC  46 H  


 


Urine Bacteria  Rare H  


 


Urine Mucus  Rare H  














  10/09/18 10/10/18 10/10/18





  23:12 07:01 07:01


 


WBC    13.0 H


 


RBC    2.56 L


 


Hgb    8.2 L


 


Hct    23.8 L


 


RDW    16.0 H


 


Plt Count    117 L


 


Neutrophils #    11.6 H


 


Lymphocytes #    0.5 L


 


INR   


 


APTT   


 


Sodium   134 L 


 


Carbon Dioxide   


 


BUN   50 H 


 


Glucose   243 H 


 


Plasma Lactic Acid Juan Carlos  2.8 H*  


 


Calcium   7.9 L 


 


Magnesium   


 


Total Protein   4.7 L 


 


Albumin   2.5 L 


 


Urine Appearance   


 


Urine Protein   


 


Urine Glucose (UA)   


 


Urine Blood   


 


Ur Leukocyte Esterase   


 


Urine WBC   


 


Urine Bacteria   


 


Urine Mucus   














- Diagnostic Findings


Chest x-ray: image reviewed





Assessment and Plan


Assessment: 





Impression:





#1 Generalized weakness secondary to recent chemotherapy and suspected urinary 

tract infection





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 

adenocarcinoma.  Previous abdominal hysterectomy and bilateral salpingo-

oophorectomy in 2015.  Recurrence with recent chemotherapy treatments.





#3 History of colon cancer status post colectomy.





#4 Lactic acidosis secondary to urinary tract infection, recovered.





#5 Leukocytosis secondary to above.





#6 Anemia with thrombocytopenia suspect secondary to chemotherapy.





#7 Chronic anxiety.





#8 Osteoarthritis.





#9 Hypertension.





#10 Gastroesophageal reflux disease





#11 Diastolic congestive heart failure with moderate aortic regurgitation and 

mild aortic stenosis with moderate pulmonary hypertension.





Plan:





The patient was seen and evaluated by Dr. Post.  Chest x-ray and labs were 

reviewed.  Not a significant amount of pleural effusion at this time per x-ray, 

ultrasound is pending.  She is maintaining good O2 saturations in the upper 90s 

on room air.  We'll continue vancomycin and cefepime for now.  Await final 

cultures.  ID is on the case.  Bronchodilators as needed.  Heparin for DVT 

prophylaxis.  We will increase her activity as tolerated.  We'll continue to 

follow and make further recommendations based on her clinical status.





I, the cosigning physician, performed a history & physical examination of the 

patient. Lungs sounds with crackles in left posterior base, diminished.  

Maintaining good O2 saturations in the 90s on room air.  I discussed the 

assessment and plan of care with my nurse practitioner, Sherri Reardon. I attest to 

the above consultation as dictated by her.


Time with Patient: Greater than 30

## 2018-10-10 NOTE — CONS
CONSULTATION



DATE OF SERVICE:

10/10/2018



REASON FOR CONSULTATION:

1. Pneumonia.

2. UTI.



HISTORY OF PRESENT ILLNESS:

The patient is a 78-year-old  female with a past medical history significant

for metastatic ovarian cancer, for which the patient has been on the chemotherapy under

Dr. Vale and has been evaluated by Dr. Eden.  The patient recently had a problem with

recurrent pleural effusion and did have thoracocentesis done x2, with pleural fluid

that was positive for malignancy related to her adenocarcinoma.  The patient presented

to the [facility] ER on 10/09/2018 with chief complaints of increasing

shortness of breath with minimal exertion.  The patient did have very minimal cough

though not bringing up any sputum.  Did have very low intensity pain on the right lower

chest area, dull, aching, only 1 to 2 out of 10 and no radiation. The patient also

complained of some burning and frequency of urine and apparently has been on antibiotic

in the outpatient setting.  The patient denies having any nausea; no vomiting or any

diarrhea.  With these symptoms, the patient has been evaluated by the ER physician. The

patient did have a chest x-ray which shows left lower lobe atelectasis and associated

effusion; correlate to exclude pneumonia; stable cardiomegaly.  The patient did not

have any high-grade fever during this admission.  However, her white count was elevated

to 18.2 and her lactic acid was elevated at 3.1.  Urine was positive with large

leukocyte esterases, 46 WBCs.  Culture has been obtained.  The patient was started on

vancomycin and cefepime.  Infectious Disease was consulted for further recommendations

regarding antibiotic therapy.



REVIEW OF SYSTEMS:

CONSTITUTIONAL: Positive for weakness but no high-grade fever.

EYES: No complaint.

ENT: No complaint.

RESPIRATORY: As per HPI.

CARDIOVASCULAR: No complaint.

GENITOURINARY: As per HPI.

GASTROINTESTINAL: No complaint.

MUSCULOSKELETAL:  No complaint.

INTEGUMENTARY: No complaint.

PSYCHOLOGICAL: No complaint.

ENDOCRINE: No complaint.

NEUROLOGICAL: No complaint.



PAST MEDICAL HISTORY:

Significant for:

1. Metastatic ovarian adenocarcinoma.

2. Malignant pleural effusion.

3. COPD.

4. Heart failure.

5. Gastroesophageal reflux disease.

6. Hypertension.

7. Anxiety.

8. Shingles.

9. Osteoarthritis.



PAST SURGICAL HISTORY:

1. Total abdominal hysterectomy and bilateral salpingo-oophorectomy.

2. Colectomy.

3. Abdominal hernia repair.

4. Left thoracocentesis x2 for recurrent pleural effusion.



SOCIAL HISTORY:

No history of smoking, drinking or drug use.



FAMILY HISTORY:

Father with history of congestive heart failure.  Mother with history of congestive

heart failure as well.



ALLERGIES:

ASPIRIN.



CURRENT MEDICATIONS:

1. Tylenol.

2. DuoNeb.

3. Xanax.

4. Norvasc.

5. Vitamin C.

6. Cefepime 2 grams q.8.

7. Lexapro.

8. Folic acid.

9. Heparin.

10.Melatonin.

11.Toprol-XL.

12.Vancomycin, Pharmacy to dose.



PHYSICAL EXAMINATION:

Blood pressure is 152/63 with a pulse of 61, temperature 97.7. She is 96% on room air.

General description is an elderly female up in the chair in no distress.  No tachypnea

or accessory muscle of respiration use.

HEENT examination shows slight pallor.  No scleral icterus.  Oral mucosa membrane is

moist. No pharyngeal erythema or thrush.

NECK: Trachea is central. No thyromegaly.

LUNGS: Unlabored breathing with decreased breath sounds in the bases. No wheeze.

HEART: S1, S2.  Regular rate and rhythm.

ABDOMEN:  Soft. No tenderness.  No guarding or rigidity.  No organomegaly.

EXTREMITIES:  No edema of the feet.

SKIN EXAMINATION:  No rash or mass palpable.

Neurologically patient is awake, alert, oriented x3. Mood and affect normal.



LABS:

Hemoglobin 8.2, white count 13,000. Admission white count was 18.2. BUN of 15,

creatinine 0.95. Electrolytes have been normal. Lactic acid elevated; has come down to

1.5.  Liver enzymes are normal.  Chest x-ray report as mentioned above.



DIAGNOSTIC IMPRESSION AND PLAN:

1. Patient admitted to hospital with increasing shortness of breath with minimal

    exertion.  She did have some cough but no significant sputum production with chest

    x-ray suggestive of left lower lobe possible infiltrate and likely effusion in a

    patient who did have a history of malignant effusion requiring thoracocentesis x2.

    Clinical suspicion is low for underlying pneumonia but not entirely excluded.

2. Patient who did have urinary burning and frequency and has been treated in the

    outpatient setting for a urinary tract infection with a positive urinalysis likely

    suspicious for a symptomatic urinary tract infection.

3. In view of the patient being in and out of the hospital and did have recurrent

    thoracocentesis, we need to cover for resistant gram-positive as well as gram-

    negative pathogens.



PLAN:

1. Vancomycin, Pharmacy to dose. Target of 15 while watching the kidney function

    closely.

2. Cefepime dose to be adjusted down to 2 grams q.12 hours.  The patient is not

    neutropenic.

3. Will follow on her clinical condition as well as cultures to further adjust

    medication if needed.

Thank you for this consultation.  Will follow this patient along with you.





MMODL / IJN: [id number] / Job#: [id number]

## 2018-10-11 LAB
ANION GAP SERPL CALC-SCNC: 6 MMOL/L
BASOPHILS # BLD AUTO: 0 K/UL (ref 0–0.2)
BASOPHILS NFR BLD AUTO: 0 %
BUN SERPL-SCNC: 37 MG/DL (ref 7–17)
CALCIUM SPEC-MCNC: 8 MG/DL (ref 8.4–10.2)
CHLORIDE SERPL-SCNC: 107 MMOL/L (ref 98–107)
CO2 SERPL-SCNC: 20 MMOL/L (ref 22–30)
EOSINOPHIL # BLD AUTO: 0.1 K/UL (ref 0–0.7)
EOSINOPHIL NFR BLD AUTO: 1 %
ERYTHROCYTE [DISTWIDTH] IN BLOOD BY AUTOMATED COUNT: 2.83 M/UL (ref 3.8–5.4)
ERYTHROCYTE [DISTWIDTH] IN BLOOD: 16.3 % (ref 11.5–15.5)
GLUCOSE SERPL-MCNC: 109 MG/DL (ref 74–99)
HCT VFR BLD AUTO: 26.5 % (ref 34–46)
HGB BLD-MCNC: 8.7 GM/DL (ref 11.4–16)
LYMPHOCYTES # SPEC AUTO: 0.7 K/UL (ref 1–4.8)
LYMPHOCYTES NFR SPEC AUTO: 6 %
MCH RBC QN AUTO: 30.8 PG (ref 25–35)
MCHC RBC AUTO-ENTMCNC: 32.9 G/DL (ref 31–37)
MCV RBC AUTO: 93.4 FL (ref 80–100)
MONOCYTES # BLD AUTO: 0.7 K/UL (ref 0–1)
MONOCYTES NFR BLD AUTO: 6 %
NEUTROPHILS # BLD AUTO: 10.2 K/UL (ref 1.3–7.7)
NEUTROPHILS NFR BLD AUTO: 87 %
PLATELET # BLD AUTO: 132 K/UL (ref 150–450)
POTASSIUM SERPL-SCNC: 4.3 MMOL/L (ref 3.5–5.1)
SODIUM SERPL-SCNC: 133 MMOL/L (ref 137–145)
WBC # BLD AUTO: 11.8 K/UL (ref 3.8–10.6)

## 2018-10-11 RX ADMIN — HEPARIN SODIUM SCH UNIT: 5000 INJECTION, SOLUTION INTRAVENOUS; SUBCUTANEOUS at 21:35

## 2018-10-11 RX ADMIN — HEPARIN SODIUM SCH UNIT: 5000 INJECTION, SOLUTION INTRAVENOUS; SUBCUTANEOUS at 07:39

## 2018-10-11 RX ADMIN — THERA TABS SCH EACH: TAB at 21:35

## 2018-10-11 RX ADMIN — IPRATROPIUM BROMIDE AND ALBUTEROL SULFATE SCH ML: .5; 3 SOLUTION RESPIRATORY (INHALATION) at 20:05

## 2018-10-11 RX ADMIN — CEFAZOLIN SCH MLS/HR: 330 INJECTION, POWDER, FOR SOLUTION INTRAMUSCULAR; INTRAVENOUS at 07:40

## 2018-10-11 RX ADMIN — IPRATROPIUM BROMIDE AND ALBUTEROL SULFATE SCH ML: .5; 3 SOLUTION RESPIRATORY (INHALATION) at 13:38

## 2018-10-11 RX ADMIN — CEFEPIME HYDROCHLORIDE SCH MLS/HR: 2 INJECTION, POWDER, FOR SOLUTION INTRAVENOUS at 07:36

## 2018-10-11 RX ADMIN — CEFEPIME HYDROCHLORIDE SCH MLS/HR: 2 INJECTION, POWDER, FOR SOLUTION INTRAVENOUS at 00:30

## 2018-10-11 RX ADMIN — CEFEPIME HYDROCHLORIDE SCH MLS/HR: 2 INJECTION, POWDER, FOR SOLUTION INTRAVENOUS at 15:56

## 2018-10-11 RX ADMIN — FOLIC ACID SCH MG: 1 TABLET ORAL at 07:38

## 2018-10-11 RX ADMIN — Medication SCH UNIT: at 13:48

## 2018-10-11 RX ADMIN — SODIUM CHLORIDE SCH MLS/HR: 9 INJECTION, SOLUTION INTRAVENOUS at 21:33

## 2018-10-11 RX ADMIN — ACETAMINOPHEN PRN MG: 325 TABLET, FILM COATED ORAL at 07:34

## 2018-10-11 RX ADMIN — SODIUM CHLORIDE SCH MLS/HR: 9 INJECTION, SOLUTION INTRAVENOUS at 10:31

## 2018-10-11 RX ADMIN — PANTOPRAZOLE SODIUM SCH MG: 40 TABLET, DELAYED RELEASE ORAL at 07:38

## 2018-10-11 RX ADMIN — CEFAZOLIN SCH MLS/HR: 330 INJECTION, POWDER, FOR SOLUTION INTRAMUSCULAR; INTRAVENOUS at 23:50

## 2018-10-11 RX ADMIN — IPRATROPIUM BROMIDE AND ALBUTEROL SULFATE SCH ML: .5; 3 SOLUTION RESPIRATORY (INHALATION) at 08:35

## 2018-10-11 RX ADMIN — ESCITALOPRAM OXALATE SCH MG: 10 TABLET, FILM COATED ORAL at 21:35

## 2018-10-11 RX ADMIN — CEFEPIME HYDROCHLORIDE SCH MLS/HR: 2 INJECTION, POWDER, FOR SOLUTION INTRAVENOUS at 23:50

## 2018-10-11 RX ADMIN — METOPROLOL SUCCINATE SCH MG: 50 TABLET, EXTENDED RELEASE ORAL at 07:39

## 2018-10-11 RX ADMIN — OXYCODONE HYDROCHLORIDE AND ACETAMINOPHEN SCH MG: 500 TABLET ORAL at 07:38

## 2018-10-11 NOTE — P.PN
Subjective


Progress Note Date: 10/11/18


Principal diagnosis: 


Stage IV ovarian cancer and generalized weakness secondary to chemotherapy and 

suspected urinary tract infection








This a very pleasant 78-year-old female patient who follows with Dr. Lay as 

her primary care physician.  She has a history of hypertension, congestive 

heart failure diastolic in nature echocardiogram revealed preserved left 

ventricular systolic function with ejection fraction 55-60%, gastroesophageal 

reflux disease, peptic ulcer disease, osteoporosis, moderate aortic 

regurgitation with mild aortic stenosis, mild mitral regurgitation secondary 

pulmonary hypertension, chronic anxiety, shingles.  She also has a history of 

ovarian cancer and is status post total abdominal hysterectomy and bilateral 

salpingo-oophorectomy followed by systemic chemotherapy.  She also has a 

history of colon cancer and status post colectomy.  She also has recent left 

recurrent pleural effusions and is status post thoracentesis 2.  The first one 

was done here by interventional radiology on 08/30/2018.  She also had a repeat 

one at [facility] on 10/03/2018.  This was positive for 

adenocarcinoma secondary to ovarian primary versus peritoneal.  She has been 

receiving chemotherapy her last dose was 3 weeks ago.  She presented here to 

the emergency room yesterday with progressive weakness and fatigue.  She states 

she was having trouble even getting herself up out of a chair.  She has been 

having some shortness of breath but not as bad as prior to her thoracentesis.  

No fever, chills or night sweats.  No cough or congestion.  She had been 

maintaining good O2 saturations in the upper 90s on room air.  She's been 

afebrile.  Hemodynamically stable.  Urine culture is pending.  Initial lactic 

acid 3.1.  Currently 1.5. She's been initiated on vancomycin and cefepime.  

Current white count 13.0.  Hemoglobin 8.2.  Platelet count 117,000.  Creatinine 

0.95.  ProBNP 1280.  Chest x-ray reveals a left lower lobe atelectasis and 

associated effusion.  She is seen today in consultation on the oncology floor.  

She is currently awake and alert in no acute distress.  She states she is 

feeling a bit better today as compared to yesterday still overall generalized 

weakness. 





Reevaluated today on 10/11/2018, patient is basically the same, but seems to be 

quite comfortable, in no distress, no shortness of breath no cough no wheezing, 

remained generally weak.  She has poor appetite and weakness.  CBC is 

relatively normal hemoglobin however is 8.7.  Basic metabolic profile is normal 

renal profile is abnormal with BUN of 37 creatinine of 0.84 suggestive of mild 

dehydration.  Lactic acid yesterday was 1.5.  No clinical suspicion whatsoever 

for pneumonia.  Patient was seen by infectious disease, kept her on vancomycin 

and cefepime.











Objective





- Vital Signs


Vital signs: 


 Vital Signs











Temp  97.9 F   10/11/18 07:20


 


Pulse  68   10/11/18 08:47


 


Resp  16   10/11/18 07:20


 


BP  160/75   10/11/18 07:20


 


Pulse Ox  95   10/11/18 08:38








 Intake & Output











 10/10/18 10/11/18 10/11/18





 18:59 06:59 18:59


 


Intake Total 700 1137.5 


 


Balance 700 1137.5 


 


Intake:   


 


    


 


    Cefepime 2 gm In Sodium 100  





    Chloride 0.9% 100 ml @   





    200 mls/hr IVPB ONCE STA   





    Rx#:[id number]   


 


  Intake, IV Titration 600 937.5 





  Amount   


 


    Sodium Chloride 0.9% 1, 600 937.5 





    000 ml @ 75 mls/hr IV .   





    [id number] [facility] Rx#:[id number]   


 


  Oral  200 


 


Other:   


 


  Voiding Method Toilet  Toilet





 Diaper  Incontinent





 Incontinent  


 


  # Voids 2 3 














- Exam





Physical Exam: Revealed a 78-year-old female in no distress.


Head: Atraumatic, normocephalic.


Lymphatics: No lymphadenopathy.


HEENT:[Neck is supple.] [No neck masses.] [No thyromegaly.] [No JVD.]  PERRLA, 

EOMI, no icterus.


Chest: [Diminished breath sounds and dullness at the left base, otherwise 

relatively unremarkable, clear on the right side.]


Cardiac Exam: [Normal S1 and S2, no S3 gallop, no murmur.]


Abdomen: [Soft, nontender,  no megaly, no rebound, no guarding, normal bowel 

sounds.]


Extremities: [No clubbing, no edema, no cyanosis.]


Neurological Exam: [No focal neurologic deficit.]





- Labs


CBC & Chem 7: 


 10/11/18 07:28





 10/11/18 07:28


Labs: 


 Abnormal Lab Results - Last 24 Hours (Table)











  10/11/18 10/11/18 Range/Units





  07:28 07:28 


 


WBC   11.8 H  (3.8-10.6)  k/uL


 


RBC   2.83 L  (3.80-5.40)  m/uL


 


Hgb   8.7 L  (11.4-16.0)  gm/dL


 


Hct   26.5 L  (34.0-46.0)  %


 


RDW   16.3 H  (11.5-15.5)  %


 


Plt Count   132 L  (150-450)  k/uL


 


Neutrophils #   10.2 H  (1.3-7.7)  k/uL


 


Lymphocytes #   0.7 L  (1.0-4.8)  k/uL


 


Sodium  133 L   (137-145)  mmol/L


 


Carbon Dioxide  20 L   (22-30)  mmol/L


 


BUN  37 H   (7-17)  mg/dL


 


Glucose  109 H   (74-99)  mg/dL


 


Calcium  8.0 L   (8.4-10.2)  mg/dL








 Microbiology - Last 24 Hours (Table)











 10/09/18 14:15 Urine Culture - Final





 Urine,Clean Catch 


 


 10/09/18 14:15 Blood Culture - Preliminary





 Blood    No Growth after 24 hours














Assessment and Plan


Assessment: 





#1 Generalized weakness secondary to recent chemotherapy 





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 

adenocarcinoma.  Not much fluid at present in the left pleural space to 

consider thoracentesis at this point.





#3 History of colon cancer status post colectomy.





#4 Lactic acidosis secondary to urinary tract infection, recovered.





#5 Leukocytosis secondary to above.





#6 Anemia with thrombocytopenia suspect secondary to chemotherapy.





#7 Chronic anxiety.





#8 Osteoarthritis.





#9 Hypertension.





#10 Gastroesophageal reflux disease





#11 Diastolic congestive heart failure with moderate aortic regurgitation and 

mild aortic stenosis with moderate pulmonary hypertension.





#12 weakness secondary to chemotherapy





#13 possible urinary tract infection, cultures are pending





#14 recurrent left-sided pleural effusion, malignant, presently not enough to 

consider thoracentesis.  Hence we'll continue to follow.





Recommendation: Continue present treatment plan including antibiotics, 

supportive care measures, continue to titrate oxygen to keep saturation above 90

%.  Await final cultures, continue bronchodilators, and continue DVT 

prophylaxis.  Increase activity as tolerated obviously the patient has many 

complex medical problems, many consultants involved.  We'll continue to follow.











Time with Patient: Less than 30

## 2018-10-11 NOTE — PN
PROGRESS NOTE



DATE OF SERVICE:

10/11/2018



REASON FOR FOLLOWUP:

Possible pneumonia and UTI.



INTERVAL HISTORY:

The patient is currently afebrile.  The patient is complaining of feeling weak and

tired and no energy.  Denies having any chest pain or shortness of breath.  Occasional

cough. No abdominal pain or any diarrhea.



PHYSICAL EXAMINATION:

Blood pressure 160/75 with a pulse of 51, temperature 97.9. She is 96% on room air.

General description is an elderly female, lying in bed in no distress.

RESPIRATORY SYSTEM: Unlabored breathing with decreased breath sounds in the bases. No

wheeze.

HEART: S1, S2.  Regular rate and rhythm.

ABDOMEN: Soft. No tenderness.

EXTREMITIES:  Trace edema of feet.



LABS:

Hemoglobin 8.7, white count 11.8.  BUN of 37, creatinine 0.84.  Lactic acid 1.9.

Cultures currently pending.



DIAGNOSTIC IMPRESSION AND PLAN:

Patient admitted to hospital with generalized weakness, shortness of breath with a

question of pneumonia versus pleural effusion in a patient who did have a history of

recurrent pleural effusion from her underlying metastatic ovarian cancer.  Waiting for

thoracocentesis on the right side with fluid being sent for cultures.  Also with a

possible component of UTI. Patient is currently covered with cefepime and vancomycin.

That will be continued while watching her clinical course and culture closely.

Continue with supportive care.





MMODL / IJN: [id number] / Job#: [id number]

## 2018-10-11 NOTE — P.PN
Subjective


Progress Note Date: 10/11/18


Principal diagnosis: 





DAKOTA secondary to malignant pleural effusion, possible superimposed pneumonia.  


Weakness due to multiple medical co-morbidities





Pt seen in f/u, she does not feel well at all, weak, no appetite, denies nausea 

or vomiting, oral irritation or difficulty swallowing.  She breathes 

comfortably at rest but any activity makes her SOB, she is so weak ni her legs 

that she can only stand then has to sit back down.  Denies chest pain, abd 

distension, dysuria, diarrhea or constipation.  





Objective





- Vital Signs


Vital signs: 


 Vital Signs











Temp  97.9 F   10/11/18 07:20


 


Pulse  68   10/11/18 08:47


 


Resp  16   10/11/18 07:20


 


BP  160/75   10/11/18 07:20


 


Pulse Ox  95   10/11/18 08:38








 Intake & Output











 10/10/18 10/11/18 10/11/18





 18:59 06:59 18:59


 


Intake Total 700 1137.5 


 


Balance 700 1137.5 


 


Intake:   


 


    


 


    Cefepime 2 gm In Sodium 100  





    Chloride 0.9% 100 ml @   





    200 mls/hr IVPB ONCE STA   





    Rx#:[id number]   


 


  Intake, IV Titration 600 937.5 





  Amount   


 


    Sodium Chloride 0.9% 1, 600 937.5 





    000 ml @ 75 mls/hr IV .   





    [id number] [facility] Rx#:[id number]   


 


  Oral  200 


 


Other:   


 


  Voiding Method Toilet  





 Diaper  





 Incontinent  


 


  # Voids 2 3 














- Constitutional


General appearance: Present: average body habitus, cooperative, no acute 

distress





- EENT


Eyes: Present: anicteric sclerae


ENT: Present: normal oropharynx





- Respiratory


Respiratory: bilateral: diminished





- Cardiovascular


Rhythm: regular


Heart sounds: normal: S1, S2


Abnormal Heart Sounds: Absent: systolic murmur, diastolic murmur, rub, S3 Gallop

, S4 Gallop, click, other





- Peripheral edema


  ** leg


Peripheral Edema: bilateral: None





- Gastrointestinal


General gastrointestinal: Present: normal bowel sounds, soft





- Integumentary


Integumentary: Present: pale





- Neurologic


Neurologic: Present: CNII-XII intact





- Musculoskeletal


Musculoskeletal: Present: generalized weakness





- Psychiatric


Psychiatric: Present: A&O x's 3, appropriate affect, intact judgment & insight





- Labs


CBC & Chem 7: 


 10/11/18 07:28





 10/11/18 07:28


Labs: 


 Abnormal Lab Results - Last 24 Hours (Table)











  10/11/18 10/11/18 Range/Units





  07:28 07:28 


 


WBC   11.8 H  (3.8-10.6)  k/uL


 


RBC   2.83 L  (3.80-5.40)  m/uL


 


Hgb   8.7 L  (11.4-16.0)  gm/dL


 


Hct   26.5 L  (34.0-46.0)  %


 


RDW   16.3 H  (11.5-15.5)  %


 


Plt Count   132 L  (150-450)  k/uL


 


Neutrophils #   10.2 H  (1.3-7.7)  k/uL


 


Lymphocytes #   0.7 L  (1.0-4.8)  k/uL


 


Sodium  133 L   (137-145)  mmol/L


 


Carbon Dioxide  20 L   (22-30)  mmol/L


 


BUN  37 H   (7-17)  mg/dL


 


Glucose  109 H   (74-99)  mg/dL


 


Calcium  8.0 L   (8.4-10.2)  mg/dL








 Microbiology - Last 24 Hours (Table)











 10/09/18 14:15 Urine Culture - Final





 Urine,Clean Catch 


 


 10/09/18 14:15 Blood Culture - Preliminary





 Blood    No Growth after 24 hours














- Imaging and Cardiology





US chest report reviewed





Assessment and Plan


(1) Pleural effusion


Narrative/Plan: 


Case was discussed with Pulmonary DNP.  US does show fluid pocket on the right.

  Will await Pulmonary assessment and recommendations.  


Current Visit: Yes   Status: Acute   Priority: Medium   Code(s): J90 - PLEURAL 

EFFUSION, NOT ELSEWHERE CLASSIFIED   SNOMED Code(s): 41507225


   





(2) Physical debility


Narrative/Plan: 


Multifactorial including SOB from malignant effusions, malignancy, pneumonia 

and anemia.  PT/OT will be ordered for when is feeling a little better.  


She is a very motivated pt


Current Visit: Yes   Status: Acute   Priority: High   Code(s): R53.81 - OTHER 

MALAISE   SNOMED Code(s): 12860934


   





(3) Ovarian cancer


Narrative/Plan: 


Pt is currently in active treatment.  Pt will f/u with Dr. Vale outpatient prior 

to continuing therapy.  No active orders or recommendations for this diagnosis.

  


Current Visit: No   Status: Acute   Priority: High   Code(s): C56.9 - MALIGNANT 

NEOPLASM OF UNSPECIFIED OVARY   SNOMED Code(s): 865497826


   





(4) Antineoplastic chemotherapy induced anemia


Narrative/Plan: 


P does receive erythrocyte stimulating agent in the outpatient setting for 

chemo induced anemia.  Her Hgb is < 10 today.  She is due for weekly injection 

so this has been ordered.  


Current Visit: Yes   Status: Chronic   Priority: Medium   Code(s): D64.81 - 

ANEMIA DUE TO ANTINEOPLASTIC CHEMOTHERAPY; T45.1X5A - ADVERSE EFFECT OF 

ANTINEOPLASTIC AND IMMUNOSUP DRUGS, INIT   SNOMED Code(s): 441922059


   


Plan: 





 Attests: I have performed H&P and developed impression and plan of care of 

patient, discussed with dictator.  I agree with dictated note, documented as a 

scribe.

## 2018-10-11 NOTE — PN
PROGRESS NOTE



DATE OF SERVICE:

10/11/2018



This 78-year-old woman who was admitted with shortness of breath with possible COPD,

acute exacerbation, and acute left lower pneumonia had possibly associated effusion and

sepsis.  No chest pain.  No palpitations.  No fever.  Patient complains of some

tiredness.



On exam, alert and oriented x3. Pulse is 70, blood pressure 160/75, respiration 16,

temperature 97.9, pulse ox 96% on room air.

HEENT: Conjunctivae normal. Oral mucosa moist.

NECK: No jugular venous distention. No carotid bruit. No lymph node enlargement.

CARDIOVASCULAR SYSTEM:  S1, S2 muffled.

RESPIRATORY SYSTEM: Breath sounds diminished at the bases.  A few scattered rhonchi and

crackles.

ABDOMEN: Soft, non-tender. No mass palpable.

LEGS: No edema. No swelling.

NERVOUS SYSTEM: No focal deficit.



Labs at this time show WBC 11.8, hemoglobin 8.7, sodium 133.  Other labs are noted.

Vancomycin was noted.



ASSESSMENT:

1. Shortness of breath with possible chronic obstructive pulmonary disease, acute

    exacerbation, with acute left lower lobe pneumonia with possibly associated

    effusion and sepsis, present on admission, possibly gram-negative.

2. Urinary tract infection.

3. Increased white count.

4. Ovarian cancer, stage IV, with metastases.

5. 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

6. Hyponatremia.

7. Hypomagnesemia.

8. History of congestive heart failure.

9. History of chronic obstructive pulmonary disease.

10.Gastroesophageal reflux disease.

11.Hypertension.

12.Gait dysfunction.

13.Chemotherapy for ovarian cancer.

14.Abdominal hysterectomy with bilateral salpingo-oophorectomy.

15.Peptic ulcer disease history.

16.History of hernia repair.

17.History of anxiety.

18.Abdominal hernia repair.

19.History of recent tracheobronchitis and recurrent pleural effusion.

20.FULL CODE.



RECOMMENDATIONS AND DISCUSSION:

I recommend to continue current medication, continue symptomatic treatment, continue

with the broad-spectrum IV antibiotics.  Otherwise, patient is on cefepime and

combination of vancomycin. Infectious Disease is evaluating the patient as well as

Pulmonary.  Guarded prognosis because of the multiple complex medical issues. I would

also recommend PT/OT evaluation and social work evaluation at this time. The patient

would like to return home after my discussion with the patient.  Guarded prognosis.

Further recommendations to follow.





MMODL / IJN: [id number] / Job#: [id number]

## 2018-10-12 VITALS — TEMPERATURE: 98.2 F | DIASTOLIC BLOOD PRESSURE: 80 MMHG | SYSTOLIC BLOOD PRESSURE: 145 MMHG

## 2018-10-12 VITALS — HEART RATE: 72 BPM

## 2018-10-12 LAB
ANION GAP SERPL CALC-SCNC: 6 MMOL/L
BASOPHILS # BLD AUTO: 0 K/UL (ref 0–0.2)
BASOPHILS NFR BLD AUTO: 0 %
BUN SERPL-SCNC: 29 MG/DL (ref 7–17)
CALCIUM SPEC-MCNC: 8 MG/DL (ref 8.4–10.2)
CHLORIDE SERPL-SCNC: 105 MMOL/L (ref 98–107)
CO2 SERPL-SCNC: 22 MMOL/L (ref 22–30)
EOSINOPHIL # BLD AUTO: 0.1 K/UL (ref 0–0.7)
EOSINOPHIL NFR BLD AUTO: 1 %
ERYTHROCYTE [DISTWIDTH] IN BLOOD BY AUTOMATED COUNT: 2.93 M/UL (ref 3.8–5.4)
ERYTHROCYTE [DISTWIDTH] IN BLOOD: 16.5 % (ref 11.5–15.5)
GLUCOSE SERPL-MCNC: 99 MG/DL (ref 74–99)
HCT VFR BLD AUTO: 27.9 % (ref 34–46)
HGB BLD-MCNC: 9 GM/DL (ref 11.4–16)
LYMPHOCYTES # SPEC AUTO: 1.2 K/UL (ref 1–4.8)
LYMPHOCYTES NFR SPEC AUTO: 13 %
MCH RBC QN AUTO: 30.9 PG (ref 25–35)
MCHC RBC AUTO-ENTMCNC: 32.4 G/DL (ref 31–37)
MCV RBC AUTO: 95.4 FL (ref 80–100)
MONOCYTES # BLD AUTO: 0.6 K/UL (ref 0–1)
MONOCYTES NFR BLD AUTO: 6 %
NEUTROPHILS # BLD AUTO: 7.9 K/UL (ref 1.3–7.7)
NEUTROPHILS NFR BLD AUTO: 80 %
PLATELET # BLD AUTO: 113 K/UL (ref 150–450)
POTASSIUM SERPL-SCNC: 4 MMOL/L (ref 3.5–5.1)
SODIUM SERPL-SCNC: 133 MMOL/L (ref 137–145)
WBC # BLD AUTO: 9.9 K/UL (ref 3.8–10.6)

## 2018-10-12 RX ADMIN — SODIUM CHLORIDE SCH MLS/HR: 9 INJECTION, SOLUTION INTRAVENOUS at 09:10

## 2018-10-12 RX ADMIN — HEPARIN SODIUM SCH UNIT: 5000 INJECTION, SOLUTION INTRAVENOUS; SUBCUTANEOUS at 07:47

## 2018-10-12 RX ADMIN — IPRATROPIUM BROMIDE AND ALBUTEROL SULFATE SCH ML: .5; 3 SOLUTION RESPIRATORY (INHALATION) at 14:01

## 2018-10-12 RX ADMIN — OXYCODONE HYDROCHLORIDE AND ACETAMINOPHEN SCH MG: 500 TABLET ORAL at 11:54

## 2018-10-12 RX ADMIN — CEFEPIME HYDROCHLORIDE SCH MLS/HR: 2 INJECTION, POWDER, FOR SOLUTION INTRAVENOUS at 07:47

## 2018-10-12 RX ADMIN — FOLIC ACID SCH MG: 1 TABLET ORAL at 11:54

## 2018-10-12 RX ADMIN — PANTOPRAZOLE SODIUM SCH MG: 40 TABLET, DELAYED RELEASE ORAL at 07:48

## 2018-10-12 RX ADMIN — CEFAZOLIN SCH: 330 INJECTION, POWDER, FOR SOLUTION INTRAMUSCULAR; INTRAVENOUS at 14:51

## 2018-10-12 RX ADMIN — ACETAMINOPHEN PRN MG: 325 TABLET, FILM COATED ORAL at 10:32

## 2018-10-12 RX ADMIN — METOPROLOL SUCCINATE SCH MG: 50 TABLET, EXTENDED RELEASE ORAL at 07:48

## 2018-10-12 RX ADMIN — IPRATROPIUM BROMIDE AND ALBUTEROL SULFATE SCH ML: .5; 3 SOLUTION RESPIRATORY (INHALATION) at 07:20

## 2018-10-12 RX ADMIN — Medication SCH UNIT: at 11:54

## 2018-10-12 NOTE — P.PN
Subjective


Progress Note Date: 10/12/18


Principal diagnosis: 





Stage IV ovarian cancer and generalized weakness secondary to chemotherapy.





This a very pleasant 78-year-old female patient who follows with Dr. Lay as 

her primary care physician.  She has a history of hypertension, congestive 

heart failure diastolic in nature echocardiogram revealed preserved left 

ventricular systolic function with ejection fraction 55-60%, gastroesophageal 

reflux disease, peptic ulcer disease, osteoporosis, moderate aortic 

regurgitation with mild aortic stenosis, mild mitral regurgitation secondary 

pulmonary hypertension, chronic anxiety, shingles.  She also has a history of 

ovarian cancer and is status post total abdominal hysterectomy and bilateral 

salpingo-oophorectomy followed by systemic chemotherapy.  She also has a 

history of colon cancer and status post colectomy.  She also has recent left 

recurrent pleural effusions and is status post thoracentesis 2.  The first one 

was done here by interventional radiology on 08/30/2018.  She also had a repeat 

one at [facility] on 10/03/2018.  This was positive for 

adenocarcinoma secondary to ovarian primary versus peritoneal.  She has been 

receiving chemotherapy her last dose was 3 weeks ago.  She presented here to 

the emergency room yesterday with progressive weakness and fatigue.  She states 

she was having trouble even getting herself up out of a chair.  She has been 

having some shortness of breath but not as bad as prior to her thoracentesis.  

No fever, chills or night sweats.  No cough or congestion.  She had been 

maintaining good O2 saturations in the upper 90s on room air.  She's been 

afebrile.  Hemodynamically stable.  Urine culture is pending.  Initial lactic 

acid 3.1.  Currently 1.5. She's been initiated on vancomycin and cefepime.  

Current white count 13.0.  Hemoglobin 8.2.  Platelet count 117,000.  Creatinine 

0.95.  ProBNP 1280.  Chest x-ray reveals a left lower lobe atelectasis and 

associated effusion.  She is seen today in consultation on the oncology floor.  

She is currently awake and alert in no acute distress.  She states she is 

feeling a bit better today as compared to yesterday still overall generalized 

weakness. 





Reevaluated today on 10/11/2018, patient is basically the same, but seems to be 

quite comfortable, in no distress, no shortness of breath no cough no wheezing, 

remained generally weak.  She has poor appetite and weakness.  CBC is 

relatively normal hemoglobin however is 8.7.  Basic metabolic profile is normal 

renal profile is abnormal with BUN of 37 creatinine of 0.84 suggestive of mild 

dehydration.  Lactic acid yesterday was 1.5.  No clinical suspicion whatsoever 

for pneumonia.  Patient was seen by infectious disease, kept her on vancomycin 

and cefepime.





The patient is seen again today 10/12/2018 in follow-up on the oncology floor.  

She is awake and alert in no acute distress.  She's been up emulating to the 

bathroom.  Still not quite back to her baseline.  She is maintaining good O2 

saturations in the 90s on room air.  No worsening cough, congestion chills.  

White count 9.9.  Hemoglobin 9.0.  Creatinine 0.90.  Blood and urine cultures 

reveal no growth.  Chest x-ray shows stable left lower lobe infiltrate and 

small effusion.  She remains on cefepime and vancomycin per ID.





Objective





- Vital Signs


Vital signs: 


 Vital Signs











Temp  97.6 F   10/12/18 05:00


 


Pulse  76   10/12/18 07:34


 


Resp  16   10/12/18 05:00


 


BP  153/74   10/12/18 05:00


 


Pulse Ox  97   10/12/18 05:00








 Intake & Output











 10/11/18 10/12/18 10/12/18





 18:59 06:59 18:59


 


Intake Total  1270 


 


Balance  1270 


 


Weight  54.885 kg 


 


Intake:   


 


  Intake, IV Titration  1150 





  Amount   


 


    Sodium Chloride 0.9% 1,  900 





    000 ml @ 75 mls/hr IV .   





    [id number] [facility] Rx#:[id number]   


 


    Vancomycin 1,000 mg In  250 





    Sodium Chloride 0.9% 250   





    ml @ 125 mls/hr IVPB BID   





    [facility] Rx#:[id number]   


 


  Oral  120 


 


Other:   


 


  Voiding Method Toilet Toilet 





 Incontinent Incontinent 


 


  # Voids 2 1 














- Exam





GENERAL EXAM: Alert, active, comfortable in no apparent distress.


HEAD: Normocephalic.


EYES: Normal reaction of pupils, equal size.


NOSE: Clear with pink turbinates.


THROAT: No erythema or exudates.


NECK: No masses, no JVD.


CHEST: No chest wall deformity.


LUNGS: Equal air entry with crackles in left posterior base.  Diminished.


CVS: S1 and S2 normal with no audible murmur, regular rhythm.


ABDOMEN: No hepatosplenomegaly, normal bowel sounds, no guarding or rigidity.


SPINE: No scoliosis or deformity


SKIN: No rashes


CENTRAL NERVOUS SYSTEM: No focal deficits, tone is normal in all 4 extremities.


EXTREMITIES: There is no peripheral edema.  No clubbing, no cyanosis.  

Peripheral pulses are intact.





- Labs


CBC & Chem 7: 


 10/12/18 07:55





 10/12/18 07:55


Labs: 


 Abnormal Lab Results - Last 24 Hours (Table)











  10/12/18 10/12/18 Range/Units





  07:55 07:55 


 


RBC   2.93 L  (3.80-5.40)  m/uL


 


Hgb   9.0 L  (11.4-16.0)  gm/dL


 


Hct   27.9 L  (34.0-46.0)  %


 


RDW   16.5 H  (11.5-15.5)  %


 


Plt Count   113 L  (150-450)  k/uL


 


Neutrophils #   7.9 H  (1.3-7.7)  k/uL


 


Sodium  133 L   (137-145)  mmol/L


 


BUN  29 H   (7-17)  mg/dL


 


Calcium  8.0 L   (8.4-10.2)  mg/dL








 Microbiology - Last 24 Hours (Table)











 10/09/18 14:15 Blood Culture - Preliminary





 Blood    No Growth after 48 hours














Assessment and Plan


Assessment: 





Impression:





#1 Generalized weakness secondary to recent chemotherapy and suspected urinary 

tract infection





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 

adenocarcinoma.  Previous abdominal hysterectomy and bilateral salpingo-

oophorectomy in 2015.  Recurrence with recent chemotherapy treatments.





#3 History of colon cancer status post colectomy.





#4 Lactic acidosis secondary to urinary tract infection, recovered.





#5 Leukocytosis secondary to above.





#6 Anemia with thrombocytopenia suspect secondary to chemotherapy.





#7 Chronic anxiety.





#8 Osteoarthritis.





#9 Hypertension.





#10 Gastroesophageal reflux disease





#11 Diastolic congestive heart failure with moderate aortic regurgitation and 

mild aortic stenosis with moderate pulmonary hypertension.





#12 Recurrent left-sided pleural effusion, malignant, not enough to consider a 

thoracentesis at this time.





Plan:





The patient was seen and evaluated by Dr. Post.  Chest x-ray and labs were 

reviewed.  She remains stable from the pulmonary standpoint.  No plans for 

thoracentesis at this time.  Maintaining good O2 saturations in the 90s on room 

air.  Antibiotics per infectious disease.  We'll follow the patient on as-

needed basis.





I, the cosigning physician, performed a history & physical examination of the 

patient. Lungs sounds with crackles in left posterior base, diminished.  

Maintaining good O2 saturations in the 90s on room air.  I discussed the 

assessment and plan of care with my nurse practitioner, Sherri Reardon. I attest to 

the above consultation as dictated by her.

## 2018-10-12 NOTE — P.PN
Subjective


Progress Note Date: 10/12/18


Principal diagnosis: 





Metastatic Cancer





Objective





- Vital Signs


Vital signs: 


 Vital Signs











Temp  98.2 F   10/12/18 12:27


 


Pulse  72   10/12/18 12:27


 


Resp  16   10/12/18 12:27


 


BP  145/80   10/12/18 12:27


 


Pulse Ox  96   10/12/18 12:27








 Intake & Output











 10/11/18 10/12/18 10/12/18





 18:59 06:59 18:59


 


Intake Total  1270 


 


Balance  1270 


 


Weight  54.885 kg 


 


Intake:   


 


  Intake, IV Titration  1150 





  Amount   


 


    Sodium Chloride 0.9% 1,  900 





    000 ml @ 75 mls/hr IV .   





    [id number] [facility] Rx#:[id number]   


 


    Vancomycin 1,000 mg In  250 





    Sodium Chloride 0.9% 250   





    ml @ 125 mls/hr IVPB BID   





    [facility] Rx#:[id number]   


 


  Oral  120 


 


Other:   


 


  Voiding Method Toilet Toilet 





 Incontinent Incontinent 


 


  # Voids 2 1 














- Exam





Constitutional


General appearance: average body habitus, cooperative, no acute distress





- EENT


Eyes: anicteric sclerae, EOMI, normal appearance


ENT: normal oropharynx





- Neck


Neck: no lymphadenopathy





- Respiratory


Respiratory: right: CTA, left: diminished (base)





- Cardiovascular


Heart sounds: normal: S1, S2


  ** leg


Peripheral Edema: bilateral: Trace





- Gastrointestinal


General gastrointestinal: no absent bowel sounds, no decreased bowel sounds, no 

distended, no hepatomegaly, no hyperactive bowel sounds, normal bowel sounds, 

no organomegaly, no rigid, no scaphoid, soft, no splenomegaly, no tenderness, 

no umbilical hernia, no ventral hernia





- Integumentary


Integumentary: pale





- Neurologic


Neurologic: CNII-XII intact





- Musculoskeletal


Musculoskeletal: generalized weakness





- Psychiatric


Psychiatric: A&O x's 3, appropriate affect, intact judgment & insight








- Labs


CBC & Chem 7: 


 10/12/18 07:55





 10/12/18 07:55


Labs: 


 Abnormal Lab Results - Last 24 Hours (Table)











  10/12/18 10/12/18 Range/Units





  07:55 07:55 


 


RBC   2.93 L  (3.80-5.40)  m/uL


 


Hgb   9.0 L  (11.4-16.0)  gm/dL


 


Hct   27.9 L  (34.0-46.0)  %


 


RDW   16.5 H  (11.5-15.5)  %


 


Plt Count   113 L  (150-450)  k/uL


 


Neutrophils #   7.9 H  (1.3-7.7)  k/uL


 


Sodium  133 L   (137-145)  mmol/L


 


BUN  29 H   (7-17)  mg/dL


 


Calcium  8.0 L   (8.4-10.2)  mg/dL








 Microbiology - Last 24 Hours (Table)











 10/09/18 14:15 Blood Culture - Preliminary





 Blood    No Growth after 48 hours














Assessment and Plan


Plan: 





Assessment and Plan


(1) Pleural effusion


Narrative/Plan: 


Malignant pleural effusion, last thoracentesis was last week.  Pulmonary 

consulted.  May need to consider pleurex drain due to rapid recurrence.  Will 

await Pulmonary evaluation and recommendations. 


 - Appears to be stable at this time, will re-evaluate as outpatient related to 

pleurex 


Current Visit: Yes   Status: Acute   Priority: Medium   Code(s): J90 - PLEURAL 

EFFUSION, NOT ELSEWHERE CLASSIFIED   SNOMED Code(s): 89592319


   





(2) Physical debility


Narrative/Plan: 


Pt has progressive weakness-multi-factorial including, malignancy, chemo, 

anemia and SOB from malignant pleural effusion.  Pt is 3 weeks out from last 

cycle with CBC stable but, physical side effects persistent.  Pt will be 

medically treated for presenting symptoms, PT/OT will be requested and we will 

cont to monitor pt progress.  


 - She will need to have visiting Nurse and rec PT/OT at home


 - She is anxious about Oxygen need at home, does live alone. We discussed 

safety and monitoring needs in home


Current Visit: Yes   Status: Acute   Priority: High   Code(s): R53.81 - OTHER 

MALAISE   SNOMED Code(s): 77197299


   





(3) Ovarian cancer


Narrative/Plan: 


Last treatment with taxol was 9/19, her most recent cycle was held due to DAKOTA 

and pleural effusion.  


There is a dose reduction planned but, there may need to be consideration for 

treatment change due to raid recurrence of pleural effusions.  


 - She will follow-up with NP or Dr. Vale in office next week to re-assess how 

she is feeling





Current Visit: No   Status: Acute   Priority: High   Code(s): C56.9 - MALIGNANT 

NEOPLASM OF UNSPECIFIED OVARY   SNOMED Code(s): 273385369





DISPO PLAN: Rec re-evaluation in am then discharge. She lives alone and will 

need home visit within 24 hours of discharge for safety.

## 2018-10-12 NOTE — PN
PROGRESS NOTE



DATE OF SERVICE:

10/12/2018



REASON FOR FOLLOWUP:

1. UTI.

2. Question of possible pneumonia.



INTERVAL HISTORY:

The patient is currently afebrile. She seems to be breathing comfortably.  Denies

having any chest pain.  Occasional cough.  No nausea. No vomiting.  No abdominal pain

and no diarrhea.



PHYSICAL EXAMINATION:

Blood pressure is 145/80 with a pulse of 72, temperature 98.2. She is 96% on room air.

General description is an elderly female up in the bed in no distress.

RESPIRATORY SYSTEM: Unlabored breathing with decreased breath sounds in the bases.  No

wheeze.

HEART: S1, S2.  Regular rate and rhythm.

ABDOMEN: Soft. No tenderness.



LABS:

Hemoglobin 9. White count normalized to 9.9 with a BUN of 29, creatinine 0.90.  Blood

culture has been negative so far.  Urine is negative.



DIAGNOSTIC IMPRESSION AND PLAN:

1. Patient admitted to hospital with generalized weakness which is likely

    multifactorial with a component of possible pleural effusion plus/minus pneumonia

    less likely but not entirely excluded.

2. Possible urinary tract infection.  Patient seems to have overall clinical

    improvement on vancomycin and cefepime with no positive blood culture for any

    resistant pathogen.  May consider a short course of oral Ceftin 500 mg b.i.d. for

    about a week to finish a course of therapy.  Family present at bedside.  Their

    questions and concerns were answered.





MMODL / IJN: [id number] / Job#: [id number]

## 2018-10-12 NOTE — XR
EXAMINATION TYPE: XR chest 2V

 

DATE OF EXAM: 10/12/2018

 

COMPARISON: 10/9/2018

 

TECHNIQUE: PA and lateral views submitted.

 

HISTORY: Shortness of breath

 

FINDINGS:

Left-sided consolidation and pleural effusion are stable. Heart is enlarged. Diffuse osteopenia and a
rthropathy of the shoulders. No pneumothorax. Interstitium is unchanged.

 

IMPRESSION:

1. Left lower lobe infiltrate and small effusion.

## 2023-10-25 NOTE — US
EXAMINATION TYPE: US kidneys/renal and bladder

 

DATE OF EXAM: 7/30/2018

 

COMPARISON: CT 5/23/2018

 

CLINICAL HISTORY: 78-year-old female with acute kidney injury N17.9. DAVID

 

Technique: Multiple sonographic images of the kidneys and bladder are obtained.

 

FINDINGS: 

 

EXAM MEASUREMENTS:

 

Right Kidney:  10.4 x 5.2 x 5.2 cm

Left Kidney: 9.9 x 5.3 x 4.3 cm

 

There is mild to moderate bilateral hydronephrosis.

 

Bladder: not fully distended, appears wnl as seen

**Bilateral Jets seen: no

 

 

 

IMPRESSION:

 

1. Mild to moderate bilateral hydronephrosis.

2. We note that the patient's 5/23/2018 body CT was performed without IV contrast. This decreases sen
sitivity. Correlate with tumor markers. If clinical concern for disease progression, follow-up contra
st-enhanced CT may be indicated.
REQUIRED- Click to run Sepsis Recognition Calculator